# Patient Record
Sex: FEMALE | Race: BLACK OR AFRICAN AMERICAN | NOT HISPANIC OR LATINO | Employment: OTHER | URBAN - METROPOLITAN AREA
[De-identification: names, ages, dates, MRNs, and addresses within clinical notes are randomized per-mention and may not be internally consistent; named-entity substitution may affect disease eponyms.]

---

## 2017-02-19 ENCOUNTER — NURSE TRIAGE (OUTPATIENT)
Dept: ADMINISTRATIVE | Facility: CLINIC | Age: 57
End: 2017-02-19

## 2017-02-20 ENCOUNTER — HOSPITAL ENCOUNTER (EMERGENCY)
Facility: HOSPITAL | Age: 57
Discharge: HOME | End: 2017-02-20
Attending: FAMILY MEDICINE
Payer: MEDICARE

## 2017-02-20 VITALS
WEIGHT: 227 LBS | DIASTOLIC BLOOD PRESSURE: 85 MMHG | TEMPERATURE: 97 F | BODY MASS INDEX: 38.96 KG/M2 | HEART RATE: 70 BPM | SYSTOLIC BLOOD PRESSURE: 168 MMHG | RESPIRATION RATE: 16 BRPM

## 2017-02-20 DIAGNOSIS — L02.412 CUTANEOUS ABSCESS OF LEFT AXILLA: Primary | ICD-10-CM

## 2017-02-20 PROCEDURE — 25000003 PHARM REV CODE 250: Performed by: FAMILY MEDICINE

## 2017-02-20 PROCEDURE — 99283 EMERGENCY DEPT VISIT LOW MDM: CPT

## 2017-02-20 RX ORDER — SULFAMETHOXAZOLE AND TRIMETHOPRIM 800; 160 MG/1; MG/1
1 TABLET ORAL
Status: COMPLETED | OUTPATIENT
Start: 2017-02-20 | End: 2017-02-20

## 2017-02-20 RX ORDER — SULFAMETHOXAZOLE AND TRIMETHOPRIM 800; 160 MG/1; MG/1
1 TABLET ORAL 2 TIMES DAILY
Qty: 14 TABLET | Refills: 0 | Status: SHIPPED | OUTPATIENT
Start: 2017-02-20 | End: 2017-02-27

## 2017-02-20 RX ADMIN — SULFAMETHOXAZOLE AND TRIMETHOPRIM 1 TABLET: 800; 160 TABLET ORAL at 12:02

## 2017-02-20 NOTE — DISCHARGE INSTRUCTIONS
Abscess (Antibiotic Treatment Only)  An abscess (sometimes called a boil) happens when bacteria get trapped under the skin and start to grow. Pus forms inside the abscess as the body responds to the bacteria. An abscess can happen with an insect bite, ingrown hair, blocked oil gland, pimple, cyst, or puncture wound.  In the early stages, your wound may be red and tender. For this stage, you may get antibiotics. If the abscess does not get better with antibiotics, it will need to be drained with a small cut.  Home care  These tips will help you care for your abscess at home:  · Soak the wound in hot water or apply hot packs (small towel soaked in hot water) to the area for 20 minutes at a time. Do this 3 to 4 times a day.  · Do not cut, squeeze, or pop the boil yourself.  · Apply antibiotic cream or ointment to the skin 3 to 4 times a day, unless something else was prescribed. Some ointments include an antibiotic plus a pain reliever.  · If your doctor prescribed antibiotics, do not stop taking them until you have finished the medicine or the doctor tells you to stop.  · You may use an over-the-counter pain medicine to control pain, unless another pain medicine was prescribed. If you have chronic liver or kidney disease or ever had a stomach ulcer or gastrointestinal bleeding, talk with your doctor before using these any of these.  Follow-up care  Follow up with your healthcare provider, or as advised. Check your wound each day for the signs of worsening infection listed below.  When to seek medical advice  Get prompt medical attention if any of these occur:  · An increase in redness or swelling  · Red streaks in the skin leading away from the abscess  · An increase in local pain or swelling  · Fever of 100.4ºF (38ºC) or higher, or as directed by your healthcare provider  · Pus or fluid coming from the abscess  · Boil returns after getting better  Date Last Reviewed: 9/1/2016  © 2180-3380 [employer] [address] [place] [address]. All rights reserved. This information is not intended as a substitute for professional medical care. Always follow your healthcare professional's instructions.

## 2017-02-20 NOTE — ED PROVIDER NOTES
Encounter Date: 2/20/2017       History     Chief Complaint   Patient presents with    Abscess     Review of patient's allergies indicates:   Allergen Reactions    Iodine and iodide containing products Rash     Patient is a 56 y.o. female presenting with the following complaint: abscess. The history is provided by the patient. No  was used.   Abscess    This is a new problem. The current episode started several days ago. The problem has been gradually worsening. The abscess is present on the left arm. The pain is at a severity of 5/10. The abscess is characterized by redness and painfulness. Pertinent negatives include no anorexia, not drinking less, no fever, no diarrhea, no vomiting, no congestion, no rhinorrhea, no sore throat, no decreased responsiveness and no cough.     Patient developed a sore in her left axilla approximately 3 days ago.  Wound is become more swollen and painful.  No fevers chills nausea or vomiting.  Has had prior abscess difficulty but she does not think in that arm before.  She took no new medication  Past Medical History   Diagnosis Date    AI (aortic insufficiency)      mild    Anemia     Anticoagulant long-term use      coumadin    Asthma     CKD (chronic kidney disease) stage 4, GFR 15-29 ml/min 8/14/2014    Congestion of upper airway     Coronary artery disease      non obstructive    Depression 8/14/2014    Diabetes mellitus     Diabetes mellitus type 2 in obese 5/14/2015    Dialysis patient     Dyspnea     E. coli UTI 2014    Encounter for blood transfusion     Heart failure with preserved ejection fraction 8/14/2014    Hyperlipemia     Hypertension     Hypothyroidism 8/14/2014    Neuropathy 8/14/2014    PAF (paroxysmal atrial fibrillation)     Shortness of breath     Type 2 diabetes mellitus with diabetic nephropathy 8/14/2014     Past Medical History Pertinent Negatives   Diagnosis Date Noted    Transfusion reaction 1/11/2016     Past  Surgical History   Procedure Laterality Date    Tubal ligation      Hysterectomy  partial    Tonsillectomy      Abscess drainage Left      groin    Colonoscopy N/A 9/17/2015     Procedure: COLONOSCOPY;  Surgeon: Sorin Schneider MD;  Location: [facility];  Service: Endoscopy;  Laterality: N/A;    Cardiac catheterization  2/17/14, 7/24/09     non-obstructive CAD in RCA    Av fistula placement, brachiocephalic Left 1/11/2016    Colonoscopy N/A 3/8/2016     Procedure: COLONOSCOPY;  Surgeon: Luis Bogran-Reyes, MD;  Location: [facility];  Service: Endoscopy;  Laterality: N/A;    Upper gastrointestinal endoscopy       Family History   Problem Relation Age of Onset    Diabetes Mother     Asthma Father     Emphysema Father     Alcohol abuse Sister     Diabetes Brother     Asthma Sister     Cancer Sister     Throat cancer Sister     Diabetes Brother      Social History   Substance Use Topics    Smoking status: Current Some Day Smoker     Packs/day: 0.12     Years: 28.00     Types: Cigarettes     Start date: 1/19/1987    Smokeless tobacco: Never Used    Alcohol use No     Review of Systems   Constitutional: Negative for decreased responsiveness and fever.   HENT: Negative for congestion, rhinorrhea and sore throat.    Respiratory: Negative for cough.    Gastrointestinal: Negative for anorexia, diarrhea and vomiting.       Physical Exam   Initial Vitals   BP Pulse Resp Temp SpO2   02/20/17 0030 02/20/17 0030 02/20/17 0030 02/20/17 0030 --   175/91 72 17 97 °F (36.1 °C)      Physical Exam    Nursing note and vitals reviewed.  Constitutional: She appears well-developed and well-nourished.   Obese female in no acute distress.   HENT:   Head: Normocephalic and atraumatic.   Eyes: EOM are normal.   Neck: Normal range of motion.   Cardiovascular: Normal rate and regular rhythm.   Pulmonary/Chest: Breath sounds normal.   Skin:   Left axilla 3 cm abscess slight fluctuance.  Patient declined I&D.   Psychiatric: She  has a normal mood and affect. Her behavior is normal. Judgment and thought content normal.         ED Course   Procedures  Labs Reviewed - No data to display                            ED Course     Clinical Impression:   The encounter diagnosis was Cutaneous abscess of left axilla.          Ricardo Espinoza MD  02/20/17 0041

## 2017-02-20 NOTE — ED AVS SNAPSHOT
[facility]  [address] [address]               Caryn Mauricio   2017 12:31 AM   ED    Description:  Female : 1960   Department:  [facility]           Your Care was Coordinated By:     Provider Role From To    Ricardo Espinoza MD Attending Provider 17 0030 --      Reason for Visit     Abscess           Diagnoses this Visit        Comments    Cutaneous abscess of left axilla    -  Primary       ED Disposition     ED Disposition Condition Comment    Discharge             To Do List            These Medications        Disp Refills Start End    sulfamethoxazole-trimethoprim 800-160mg (BACTRIM DS) 800-160 mg Tab 14 tablet 0 2017    Take 1 tablet by mouth 2 (two) times daily. - Oral    Pharmacy: [facility] [address] [place] [address] #: [phone]         [facility]     [facility] -  Assistance  Registered nurses in the [facility] provide clinical advisement, health education, appointment booking, and other advisory services.  Call for this free service at [phone].             Medications           Message regarding Medications     Verify the changes and/or additions to your medication regime listed below are the same as discussed with your clinician today.  If any of these changes or additions are incorrect, please notify your healthcare provider.        START taking these NEW medications        Refills    sulfamethoxazole-trimethoprim 800-160mg (BACTRIM DS) 800-160 mg Tab 0    Sig: Take 1 tablet by mouth 2 (two) times daily.    Class: Print    Route: Oral      These medications were administered today        Dose Freq    sulfamethoxazole-trimethoprim 800-160mg per tablet 1 tablet 1 tablet ED 1 Time    Sig: Take 1 tablet by mouth ED 1 Time.    Class: Normal    Route: Oral           Verify that the below list of medications is an accurate  representation of the medications you are currently taking.  If none reported, the list may be blank. If incorrect, please contact your healthcare provider. Carry this list with you in case of emergency.           Current Medications     apixaban 2.5 mg Tab Take 1 tablet (2.5 mg total) by mouth 2 (two) times daily.    aspirin (ECOTRIN) 81 MG EC tablet Take 1 tablet (81 mg total) by mouth once daily.    blood sugar diagnostic Strp 1 strip by Misc.(Non-Drug; Combo Route) route 2 hours after meals and at bedtime.    carvedilol (COREG) 6.25 MG tablet Take 1 tablet (6.25 mg total) by mouth 2 (two) times daily with meals.    citalopram (CELEXA) 20 MG tablet Take 1 tablet (20 mg total) by mouth once daily.    docusate sodium (COLACE) 50 MG capsule Take 50 mg by mouth 2 (two) times daily as needed. OTC 1 tab every other day    ergocalciferol (ERGOCALCIFEROL) 50,000 unit Cap Take 1 capsule (50,000 Units total) by mouth every 7 days.    ferrous sulfate 325 (65 FE) MG EC tablet Take 1 tablet (325 mg total) by mouth 3 (three) times daily with meals.    furosemide (LASIX) 80 MG tablet Take 0.5 tablets (40 mg total) by mouth 2 (two) times daily.    gabapentin (NEURONTIN) 100 MG capsule Take 1 capsule (100 mg total) by mouth 3 (three) times daily.    hydrocodone-acetaminophen 10-325mg (NORCO)  mg Tab Take 1 tablet by mouth 2 (two) times daily as needed.    hydrocodone-acetaminophen 10-325mg (NORCO)  mg Tab Take 1 tablet by mouth every 12 (twelve) hours as needed for Pain.    insulin aspart (NOVOLOG FLEXPEN) 100 unit/mL InPn pen Inject 14 Units into the skin 3 (three) times daily with meals. Titrate as directed    insulin glargine (LANTUS SOLOSTAR) 100 unit/mL (3 mL) InPn pen Inject 40 Units into the skin every evening. Titrate as directed    isosorbide mononitrate (IMDUR) 30 MG 24 hr tablet Take 1 tablet (30 mg total) by mouth once daily.    levothyroxine (SYNTHROID) 150 MCG tablet Take 1 tablet (150 mcg total) by  "mouth before breakfast.    ondansetron (ZOFRAN) 4 MG tablet Take 1 tablet (4 mg total) by mouth every 6 (six) hours as needed for Nausea.    PEN NEEDLE 31 gauge x 1/4" Ndle USE TO INJECT INSULIN FOUR TIMES DAILY    pravastatin (PRAVACHOL) 20 MG tablet Take 1 tablet (20 mg total) by mouth every evening.    sulfamethoxazole-trimethoprim 800-160mg (BACTRIM DS) 800-160 mg Tab Take 1 tablet by mouth 2 (two) times daily.    sulfamethoxazole-trimethoprim 800-160mg per tablet 1 tablet Take 1 tablet by mouth ED 1 Time.    trazodone (DESYREL) 50 MG tablet Take 1 tablet (50 mg total) by mouth nightly as needed.           Clinical Reference Information           Your Vitals Were     BP Pulse Temp Resp Weight Last Period    175/91 (BP Location: Left arm, Patient Position: Sitting) 72 97 °F (36.1 °C) (Oral) 17 103 kg (227 lb) 12/23/2000 (LMP Unknown)    BMI                38.96 kg/m2          Allergies as of 2/20/2017        Reactions    Iodine And Iodide Containing Products Rash      Immunizations Administered on Date of Encounter - 2/20/2017     None      ED Micro, Lab, POCT     None      ED Imaging Orders     None        Discharge Instructions         Abscess (Antibiotic Treatment Only)  An abscess (sometimes called a boil) happens when bacteria get trapped under the skin and start to grow. Pus forms inside the abscess as the body responds to the bacteria. An abscess can happen with an insect bite, ingrown hair, blocked oil gland, pimple, cyst, or puncture wound.  In the early stages, your wound may be red and tender. For this stage, you may get antibiotics. If the abscess does not get better with antibiotics, it will need to be drained with a small cut.  Home care  These tips will help you care for your abscess at home:  · Soak the wound in hot water or apply hot packs (small towel soaked in hot water) to the area for 20 minutes at a time. Do this 3 to 4 times a day.  · Do not cut, squeeze, or pop the boil " yourself.  · Apply antibiotic cream or ointment to the skin 3 to 4 times a day, unless something else was prescribed. Some ointments include an antibiotic plus a pain reliever.  · If your doctor prescribed antibiotics, do not stop taking them until you have finished the medicine or the doctor tells you to stop.  · You may use an over-the-counter pain medicine to control pain, unless another pain medicine was prescribed. If you have chronic liver or kidney disease or ever had a stomach ulcer or gastrointestinal bleeding, talk with your doctor before using these any of these.  Follow-up care  Follow up with your healthcare provider, or as advised. Check your wound each day for the signs of worsening infection listed below.  When to seek medical advice  Get prompt medical attention if any of these occur:  · An increase in redness or swelling  · Red streaks in the skin leading away from the abscess  · An increase in local pain or swelling  · Fever of 100.4ºF (38ºC) or higher, or as directed by your healthcare provider  · Pus or fluid coming from the abscess  · Boil returns after getting better  Date Last Reviewed: 9/1/2016  © 0780-7212 [employer]. [address] [place] [address]. All rights reserved. This information is not intended as a substitute for professional medical care. Always follow your healthcare professional's instructions.          Your Scheduled Appointments     Feb 21, 2017 12:30 PM CST   Established Patient Visit with MD BALA Don - General Surgery ([facility])    [address] [facility] [address]   [phone]            Mar 13, 2017 10:30 AM CDT   Established Patient Visit with LYNSEY PMR RESIDENT   BALA Smiley - Phys. Med & Rehab ([facility])    [address] [facility] [address]   [phone]            May 31, 2017  9:00 AM CDT   Established Patient Visit with MD BALA Thomason IV -  Ophthalmology ([facility])    [address] [facility] [address]   [phone]            Jun 05, 2017  8:20 AM CDT   Fasting Lab with [facility]   [facility]    [address] [place] [address]   [phone]            Jun 12, 2017  9:40 AM CDT   Established Patient Visit with MD BALA [facility] - Endocrinology ([facility])    [address] [place] [address]   [phone]              Smoking Cessation     If you would like to quit smoking:   You may be eligible for free services if you are a [place] resident and started smoking cigarettes before September 1, 1988.  Call the Smoking Cessation Trust (SCT) toll free at (136) 997-7497 or (333) 215-5266.   Call 7-800-QUIT-NOW if you do not meet the above criteria.             [facility] complies with applicable Federal civil rights laws and does not discriminate on the basis of race, color, national origin, age, disability, or sex.        Language Assistance Services     ATTENTION: Language assistance services are available, free of charge. Please call [phone].      ATENCIÓN: Si habla español, tiene a newman disposición servicios gratuitos de asistencia lingüística. Llame al [phone].     CHÚ Ý: N?u b?n nói Ti?ng Vi?t, có các d?ch v? h? tr? ngôn ng? mi?n phí dành cho b?n. G?i s? [phone].

## 2017-02-20 NOTE — TELEPHONE ENCOUNTER
"  Reason for Disposition   SEVERE pain (e.g., excruciating)    Answer Assessment - Initial Assessment Questions  1. APPEARANCE of BOIL: "What does the boil look like?"       Looks full of infection  2. LOCATION: "Where is the boil located?"       Under left arm  3. NUMBER: "How many boils are there?"       one  4. SIZE: "How big is the boil?" (e.g., inches, cm; compare to size of a coin or other object)      Size of a quarter  5. ONSET: "When did the boil start?"      2-3 days ago  6. PAIN: "Is there any pain?" If so, ask: "How bad is the pain?"   (Scale 1-10; or mild, moderate, severe)      10  7. FEVER: "Do you have a fever?" If so, ask: "What is it, how was it measured, and when did it start?"       Did not check temp  8. SOURCE: "Have you been around anyone with boils or other Staph infections?" "Have you ever had boils before?"       no  9. OTHER SYMPTOMS: "Do you have any other symptoms?" (e.g., shaking chills, weakness, rash elsewhere on body)       no  10. PREGNANCY: "Is there any chance you are pregnant?" "When was your last menstrual period?"        no    Protocols used: ST BOIL (SKIN ABSCESS)-A-  pt has not taken anything for pain.  Pt states she is a dialysis pt and her fistula is in her upper arm and she is afraid to go to ED because they will probably chris it.  "

## 2017-03-03 ENCOUNTER — HOSPITAL ENCOUNTER (INPATIENT)
Facility: HOSPITAL | Age: 57
LOS: 2 days | Discharge: HOME | DRG: 602 | End: 2017-03-05
Attending: HOSPITALIST | Admitting: HOSPITALIST
Payer: MEDICARE

## 2017-03-03 ENCOUNTER — HOSPITAL ENCOUNTER (EMERGENCY)
Facility: HOSPITAL | Age: 57
Discharge: ANOTHER HOSPITAL | End: 2017-03-03
Attending: SURGERY
Payer: MEDICARE

## 2017-03-03 VITALS
SYSTOLIC BLOOD PRESSURE: 169 MMHG | OXYGEN SATURATION: 96 % | WEIGHT: 225 LBS | DIASTOLIC BLOOD PRESSURE: 58 MMHG | TEMPERATURE: 99 F | RESPIRATION RATE: 19 BRPM | BODY MASS INDEX: 38.62 KG/M2 | HEART RATE: 79 BPM

## 2017-03-03 DIAGNOSIS — I10 ESSENTIAL HYPERTENSION: Chronic | ICD-10-CM

## 2017-03-03 DIAGNOSIS — I50.20 SYSTOLIC CONGESTIVE HEART FAILURE: ICD-10-CM

## 2017-03-03 DIAGNOSIS — F32.A DEPRESSION: Chronic | ICD-10-CM

## 2017-03-03 DIAGNOSIS — Z99.2 DIALYSIS PATIENT: ICD-10-CM

## 2017-03-03 DIAGNOSIS — I95.9 HYPOTENSION, UNSPECIFIED HYPOTENSION TYPE: ICD-10-CM

## 2017-03-03 DIAGNOSIS — I25.83 CORONARY ARTERY DISEASE DUE TO LIPID RICH PLAQUE: Chronic | ICD-10-CM

## 2017-03-03 DIAGNOSIS — N18.9 ANEMIA OF RENAL DISEASE: Chronic | ICD-10-CM

## 2017-03-03 DIAGNOSIS — R11.2 INTRACTABLE VOMITING WITH NAUSEA, UNSPECIFIED VOMITING TYPE: Primary | ICD-10-CM

## 2017-03-03 DIAGNOSIS — E11.21 TYPE 2 DIABETES MELLITUS WITH DIABETIC NEPHROPATHY: Chronic | ICD-10-CM

## 2017-03-03 DIAGNOSIS — N18.6 ESRD NEEDING DIALYSIS: ICD-10-CM

## 2017-03-03 DIAGNOSIS — I50.30 HEART FAILURE WITH PRESERVED EJECTION FRACTION: Chronic | ICD-10-CM

## 2017-03-03 DIAGNOSIS — E78.5 HLD (HYPERLIPIDEMIA): Chronic | ICD-10-CM

## 2017-03-03 DIAGNOSIS — E03.9 HYPOTHYROIDISM: Chronic | ICD-10-CM

## 2017-03-03 DIAGNOSIS — I48.0 PAROXYSMAL ATRIAL FIBRILLATION: Chronic | ICD-10-CM

## 2017-03-03 DIAGNOSIS — R50.9 FEVER, UNSPECIFIED FEVER CAUSE: ICD-10-CM

## 2017-03-03 DIAGNOSIS — Z72.0 TOBACCO ABUSE: Chronic | ICD-10-CM

## 2017-03-03 DIAGNOSIS — G62.9 NEUROPATHY: Chronic | ICD-10-CM

## 2017-03-03 DIAGNOSIS — L02.419 ABSCESS OF AXILLARY REGION: ICD-10-CM

## 2017-03-03 DIAGNOSIS — I25.10 CORONARY ARTERY DISEASE DUE TO LIPID RICH PLAQUE: Chronic | ICD-10-CM

## 2017-03-03 DIAGNOSIS — D63.1 ANEMIA OF RENAL DISEASE: Chronic | ICD-10-CM

## 2017-03-03 DIAGNOSIS — R50.9 FEBRILE ILLNESS: ICD-10-CM

## 2017-03-03 DIAGNOSIS — Z79.01 CHRONIC ANTICOAGULATION: Primary | ICD-10-CM

## 2017-03-03 DIAGNOSIS — Z99.2 ESRD NEEDING DIALYSIS: ICD-10-CM

## 2017-03-03 DIAGNOSIS — E66.01 MORBID OBESITY WITH BMI OF 40.0-44.9, ADULT: Chronic | ICD-10-CM

## 2017-03-03 DIAGNOSIS — L02.419 AXILLARY ABSCESS: ICD-10-CM

## 2017-03-03 LAB
ALBUMIN SERPL BCP-MCNC: 3.6 G/DL
ALP SERPL-CCNC: 82 U/L
ALT SERPL W/O P-5'-P-CCNC: 9 U/L
ANION GAP SERPL CALC-SCNC: 12 MMOL/L
APTT BLDCRRT: 29 SEC
AST SERPL-CCNC: 12 U/L
BASOPHILS # BLD AUTO: 0.01 K/UL
BASOPHILS NFR BLD: 0.1 %
BILIRUB SERPL-MCNC: 0.6 MG/DL
BNP SERPL-MCNC: 585 PG/ML
BUN SERPL-MCNC: 34 MG/DL
CALCIUM SERPL-MCNC: 9.1 MG/DL
CHLORIDE SERPL-SCNC: 97 MMOL/L
CK MB SERPL-MCNC: 1.7 NG/ML
CK MB SERPL-RTO: 1.3 %
CK SERPL-CCNC: 128 U/L
CK SERPL-CCNC: 128 U/L
CO2 SERPL-SCNC: 29 MMOL/L
CREAT SERPL-MCNC: 5 MG/DL
DEPRECATED S PYO AG THROAT QL EIA: NEGATIVE
DIFFERENTIAL METHOD: ABNORMAL
EOSINOPHIL # BLD AUTO: 0.2 K/UL
EOSINOPHIL NFR BLD: 2.3 %
ERYTHROCYTE [DISTWIDTH] IN BLOOD BY AUTOMATED COUNT: 16.4 %
EST. GFR  (AFRICAN AMERICAN): 10 ML/MIN/1.73 M^2
EST. GFR  (NON AFRICAN AMERICAN): 9 ML/MIN/1.73 M^2
FLUAV AG SPEC QL IA: NEGATIVE
FLUBV AG SPEC QL IA: NEGATIVE
GLUCOSE SERPL-MCNC: 150 MG/DL
HCT VFR BLD AUTO: 36.9 %
HGB BLD-MCNC: 11.5 G/DL
HIV1+2 IGG SERPL QL IA.RAPID: NEGATIVE
INR PPP: 1.1
LACTATE SERPL-SCNC: 1 MMOL/L
LYMPHOCYTES # BLD AUTO: 0.8 K/UL
LYMPHOCYTES NFR BLD: 11.8 %
MCH RBC QN AUTO: 26.5 PG
MCHC RBC AUTO-ENTMCNC: 31.2 %
MCV RBC AUTO: 85 FL
MONOCYTES # BLD AUTO: 0.6 K/UL
MONOCYTES NFR BLD: 7.8 %
NEUTROPHILS # BLD AUTO: 5.5 K/UL
NEUTROPHILS NFR BLD: 78 %
PLATELET # BLD AUTO: 243 K/UL
PMV BLD AUTO: 11.1 FL
POTASSIUM SERPL-SCNC: 3.5 MMOL/L
PROT SERPL-MCNC: 7.6 G/DL
PROTHROMBIN TIME: 10.7 SEC
RBC # BLD AUTO: 4.34 M/UL
SODIUM SERPL-SCNC: 138 MMOL/L
SPECIMEN SOURCE: NORMAL
TROPONIN I SERPL DL<=0.01 NG/ML-MCNC: 0.02 NG/ML
TSH SERPL DL<=0.005 MIU/L-ACNC: 3.99 UIU/ML
WBC # BLD AUTO: 7.09 K/UL

## 2017-03-03 PROCEDURE — 99285 EMERGENCY DEPT VISIT HI MDM: CPT | Mod: 25

## 2017-03-03 PROCEDURE — 85025 COMPLETE CBC W/AUTO DIFF WBC: CPT

## 2017-03-03 PROCEDURE — 85610 PROTHROMBIN TIME: CPT

## 2017-03-03 PROCEDURE — 82553 CREATINE MB FRACTION: CPT

## 2017-03-03 PROCEDURE — 96367 TX/PROPH/DG ADDL SEQ IV INF: CPT

## 2017-03-03 PROCEDURE — 63600175 PHARM REV CODE 636 W HCPCS: Performed by: SURGERY

## 2017-03-03 PROCEDURE — 83880 ASSAY OF NATRIURETIC PEPTIDE: CPT

## 2017-03-03 PROCEDURE — 96365 THER/PROPH/DIAG IV INF INIT: CPT

## 2017-03-03 PROCEDURE — 84443 ASSAY THYROID STIM HORMONE: CPT

## 2017-03-03 PROCEDURE — 87040 BLOOD CULTURE FOR BACTERIA: CPT | Mod: 59

## 2017-03-03 PROCEDURE — 86701 HIV-1ANTIBODY: CPT

## 2017-03-03 PROCEDURE — 87081 CULTURE SCREEN ONLY: CPT

## 2017-03-03 PROCEDURE — 85730 THROMBOPLASTIN TIME PARTIAL: CPT

## 2017-03-03 PROCEDURE — 36415 COLL VENOUS BLD VENIPUNCTURE: CPT

## 2017-03-03 PROCEDURE — 87880 STREP A ASSAY W/OPTIC: CPT

## 2017-03-03 PROCEDURE — 87400 INFLUENZA A/B EACH AG IA: CPT

## 2017-03-03 PROCEDURE — 11000001 HC ACUTE MED/SURG PRIVATE ROOM

## 2017-03-03 PROCEDURE — 84484 ASSAY OF TROPONIN QUANT: CPT

## 2017-03-03 PROCEDURE — 96375 TX/PRO/DX INJ NEW DRUG ADDON: CPT

## 2017-03-03 PROCEDURE — 83605 ASSAY OF LACTIC ACID: CPT

## 2017-03-03 PROCEDURE — 25000003 PHARM REV CODE 250: Performed by: SURGERY

## 2017-03-03 PROCEDURE — 80053 COMPREHEN METABOLIC PANEL: CPT

## 2017-03-03 RX ORDER — SODIUM CHLORIDE 9 MG/ML
1000 INJECTION, SOLUTION INTRAVENOUS
Status: DISCONTINUED | OUTPATIENT
Start: 2017-03-03 | End: 2017-03-03

## 2017-03-03 RX ORDER — ONDANSETRON 2 MG/ML
4 INJECTION INTRAMUSCULAR; INTRAVENOUS
Status: COMPLETED | OUTPATIENT
Start: 2017-03-03 | End: 2017-03-03

## 2017-03-03 RX ADMIN — VANCOMYCIN HYDROCHLORIDE 1000 MG: 1 INJECTION, POWDER, LYOPHILIZED, FOR SOLUTION INTRAVENOUS at 08:03

## 2017-03-03 RX ADMIN — PROMETHAZINE HYDROCHLORIDE 12.5 MG: 25 INJECTION INTRAMUSCULAR; INTRAVENOUS at 07:03

## 2017-03-03 RX ADMIN — ONDANSETRON 4 MG: 2 INJECTION INTRAMUSCULAR; INTRAVENOUS at 08:03

## 2017-03-03 RX ADMIN — VANCOMYCIN HYDROCHLORIDE 1000 MG: 1 INJECTION, POWDER, LYOPHILIZED, FOR SOLUTION INTRAVENOUS at 09:03

## 2017-03-03 RX ADMIN — SODIUM CHLORIDE 500 ML: 0.9 INJECTION, SOLUTION INTRAVENOUS at 07:03

## 2017-03-03 NOTE — IP AVS SNAPSHOT
[place] [address] [address] [address]  Phone: [phone]           Patient Discharge Instructions     Our goal is to set you up for success. This packet includes information on your condition, medications, and your home care. It will help you to care for yourself so you don't get sicker and need to go back to the hospital.     Please ask your nurse if you have any questions.        There are many details to remember when preparing to leave the hospital. Here is what you will need to do:    1. Take your medicine. If you are prescribed medications, review your Medication List in the following pages. You may have new medications to  at the pharmacy and others that you'll need to stop taking. Review the instructions for how and when to take your medications. Talk with your doctor or nurses if you are unsure of what to do.     2. Go to your follow-up appointments. Specific follow-up information is listed in the following pages. Your may be contacted by a transition nurse or clinical provider about future appointments. Be sure we have all of the phone numbers to reach you, if needed. Please contact your provider's office if you are unable to make an appointment.     3. Watch for warning signs. Your doctor or nurse will give you detailed warning signs to watch for and when to call for assistance. These instructions may also include educational information about your condition. If you experience any of warning signs to your health, call your doctor.               ** Verify the list of medication(s) below is accurate and up to date. Carry this with you in case of emergency. If your medications have changed, please notify your healthcare provider.             Medication List      START taking these medications        Additional Info                      clindamycin 300 MG capsule   Commonly known as:  CLEOCIN   Quantity:  21 capsule   Refills:  0   Dose:  300 mg   Indications:   Skin & soft tissue    Last time this was given:  300 mg on 3/5/2017 11:48 AM   Instructions:  Take 1 capsule (300 mg total) by mouth every 8 (eight) hours.     Begin Date    AM    Noon    PM    Bedtime         CHANGE how you take these medications        Additional Info                      blood sugar diagnostic Strp   Quantity:  100 strip   Refills:  6   Dose:  1 strip   What changed:  additional instructions    Instructions:  1 strip by Misc.(Non-Drug; Combo Route) route 2 hours after meals and at bedtime.     Begin Date    AM    Noon    PM    Bedtime         CONTINUE taking these medications        Additional Info                      apixaban 2.5 mg Tab   Quantity:  180 tablet   Refills:  3   Dose:  2.5 mg    Last time this was given:  2.5 mg on 3/5/2017  9:22 AM   Instructions:  Take 1 tablet (2.5 mg total) by mouth 2 (two) times daily.     Begin Date    AM    Noon    PM    Bedtime       carvedilol 6.25 MG tablet   Commonly known as:  COREG   Quantity:  180 tablet   Refills:  5   Dose:  6.25 mg    Last time this was given:  6.25 mg on 3/5/2017  9:22 AM   Instructions:  Take 1 tablet (6.25 mg total) by mouth 2 (two) times daily with meals.     Begin Date    AM    Noon    PM    Bedtime       citalopram 20 MG tablet   Commonly known as:  CELEXA   Quantity:  90 tablet   Refills:  6   Dose:  20 mg    Last time this was given:  20 mg on 3/5/2017  9:22 AM   Instructions:  Take 1 tablet (20 mg total) by mouth once daily.     Begin Date    AM    Noon    PM    Bedtime       docusate sodium 50 MG capsule   Commonly known as:  COLACE   Refills:  0   Dose:  50 mg    Instructions:  Take 50 mg by mouth 2 (two) times daily as needed. OTC 1 tab every other day     Begin Date    AM    Noon    PM    Bedtime       ergocalciferol 50,000 unit Cap   Commonly known as:  ERGOCALCIFEROL   Quantity:  12 capsule   Refills:  2   Dose:  95231 Units    Instructions:  Take 1 capsule (50,000 Units total) by mouth every 7 days.     Begin Date     AM    Noon    PM    Bedtime       ferrous sulfate 325 (65 FE) MG EC tablet   Refills:  0   Dose:  325 mg    Instructions:  Take 1 tablet (325 mg total) by mouth 3 (three) times daily with meals.     Begin Date    AM    Noon    PM    Bedtime       furosemide 80 MG tablet   Commonly known as:  LASIX   Quantity:  180 tablet   Refills:  5   Dose:  40 mg   Comments:  May take extra dose for SOB/swelling    Last time this was given:  40 mg on 3/5/2017  9:22 AM   Instructions:  Take 0.5 tablets (40 mg total) by mouth 2 (two) times daily.     Begin Date    AM    Noon    PM    Bedtime       gabapentin 100 MG capsule   Commonly known as:  NEURONTIN   Quantity:  90 capsule   Refills:  11   Dose:  100 mg    Last time this was given:  100 mg on 3/5/2017  7:18 AM   Instructions:  Take 1 capsule (100 mg total) by mouth 3 (three) times daily.     Begin Date    AM    Noon    PM    Bedtime       hydrocodone-acetaminophen 10-325mg  mg Tab   Commonly known as:  NORCO   Quantity:  10 tablet   Refills:  0   Dose:  1 tablet    Instructions:  Take 1 tablet by mouth every 8 (eight) hours as needed for Pain.     Begin Date    AM    Noon    PM    Bedtime       insulin aspart 100 unit/mL Inpn pen   Commonly known as:  NOVOLOG FLEXPEN   Quantity:  38 mL   Refills:  3   Dose:  14 Units    Last time this was given:  2 Units on 3/5/2017  9:29 AM   Instructions:  Inject 14 Units into the skin 3 (three) times daily with meals. Titrate as directed     Begin Date    AM    Noon    PM    Bedtime       insulin glargine 100 unit/mL (3 mL) Inpn pen   Commonly known as:  LANTUS SOLOSTAR   Quantity:  31.5 mL   Refills:  3   Dose:  40 Units    Instructions:  Inject 40 Units into the skin every evening. Titrate as directed     Begin Date    AM    Noon    PM    Bedtime       isosorbide mononitrate 30 MG 24 hr tablet   Commonly known as:  IMDUR   Quantity:  90 tablet   Refills:  5   Dose:  30 mg    Last time this was given:  30 mg on 3/5/2017  9:22 AM  "  Instructions:  Take 1 tablet (30 mg total) by mouth once daily.     Begin Date    AM    Noon    PM    Bedtime       levothyroxine 150 MCG tablet   Commonly known as:  SYNTHROID   Quantity:  90 tablet   Refills:  3   Dose:  150 mcg    Last time this was given:  150 mcg on 3/5/2017  7:19 AM   Instructions:  Take 1 tablet (150 mcg total) by mouth before breakfast.     Begin Date    AM    Noon    PM    Bedtime       ondansetron 4 MG tablet   Commonly known as:  ZOFRAN   Quantity:  12 tablet   Refills:  0   Dose:  4 mg    Instructions:  Take 1 tablet (4 mg total) by mouth every 6 (six) hours as needed for Nausea.     Begin Date    AM    Noon    PM    Bedtime       PEN NEEDLE 31 gauge x 1/4" Ndle   Quantity:  100 each   Refills:  0   Generic drug:  pen needle, diabetic    Instructions:  USE TO INJECT INSULIN FOUR TIMES DAILY     Begin Date    AM    Noon    PM    Bedtime       pravastatin 20 MG tablet   Commonly known as:  PRAVACHOL   Quantity:  90 tablet   Refills:  5   Dose:  20 mg    Last time this was given:  20 mg on 3/4/2017 10:02 PM   Instructions:  Take 1 tablet (20 mg total) by mouth every evening.     Begin Date    AM    Noon    PM    Bedtime       trazodone 50 MG tablet   Commonly known as:  DESYREL   Quantity:  90 tablet   Refills:  3   Dose:  50 mg    Instructions:  Take 1 tablet (50 mg total) by mouth nightly as needed.     Begin Date    AM    Noon    PM    Bedtime         STOP taking these medications     aspirin 81 MG EC tablet   Commonly known as:  ECOTRIN            Where to Get Your Medications      You can get these medications from any pharmacy     Bring a paper prescription for each of these medications     clindamycin 300 MG capsule                  Please bring to all follow up appointments:    1. A copy of your discharge instructions.  2. All medicines you are currently taking in their original bottles.  3. Identification and insurance card.    Please arrive 15 minutes ahead of scheduled " appointment time.    Please call 24 hours in advance if you must reschedule your appointment and/or time.        Your Scheduled Appointments     Mar 20, 2017 12:45 PM CDT   Established Patient Visit with MD BALA Don - General Surgery ([facility]    [address] [place] [id number]   [phone]            May 31, 2017  9:00 AM CDT   Established Patient Visit with MD BALA Thomason IV - Ophthalmology ([facility]    [address] [place] [id number]   [phone]            Jun 05, 2017  8:20 AM CDT   Fasting Lab with [facility]    [address] [place] [id number]   [phone]            Jun 12, 2017  9:40 AM CDT   Established Patient Visit with MD BALA Taylor - Endocrinology ([facility])    [address] [place] [id number]   [phone]            Jul 10, 2017 10:30 AM CDT   Established Patient Visit with ROBBIE SMILEYR RESIDENT   BALA Smiley - Phys. Med & Rehab ([facility])    [address] [place] [id number]   [phone]              Follow-up Information     Follow up with Resume dialysis In 1 week.        Discharge Instructions     Future Orders    Diet general     Comments:    Renal, 1800 ADA and cardiac.    Questions:    Total calories:      Fat restriction, if any:      Protein restriction, if any:      Na restriction, if any:      Fluid restriction:      Additional restrictions:        Discharge References/Attachments     CLINDAMYCIN CAPSULES (ENGLISH)    ABSCESS, ANTIBIOTIC TREATMENT ONLY (ENGLISH)    ABSCESS, INCISION AND DRAINAGE (ENGLISH)        Primary Diagnosis     Your primary diagnosis was:  Armpit Abscess      Admission Information     Date & Time Provider Department [facility]    3/3/2017 11:25 PM Melissa Sargent MD [facility] [id number]      Care Providers      "Provider Role Specialty Primary office phone    Melissa Sargent MD Attending Provider Hospitalist [phone]    Emilee Mi MD Consulting Physician  Nephrology [phone]    Larry Zuniga MD Consulting Physician  General Surgery [phone]    Logan Osborne MD Consulting Physician  Neurology [phone]      Important Medicare Message          Most Recent Value    Important Message from Medicare Regarding Discharge Appeal Rights  Explained to patient/caregiver, Given to patient/caregiver, Signed/date by patient/caregiver yes 03/05/2017 1144      Your Vitals Were     BP Pulse Temp Resp Height Weight    113/54 (BP Location: Right arm, Patient Position: Lying, BP Method: Automatic) 61 98 °F (36.7 °C) (Oral) 18 5' 4" (1.626 m) 104 kg (229 lb 4.5 oz)    Last Period SpO2 BMI          12/23/2000 (LMP Unknown) 99% 39.36 kg/m2        Recent Lab Values        3/12/2015 6/15/2015 10/23/2015 11/16/2015 1/20/2016 2/12/2016 6/21/2016 3/4/2017      9:18 AM 10:20 AM 11:29 AM 11:01 AM  7:25 AM 12:57 PM  1:14 PM  5:09 AM    A1C 8.9 (H) 8.3 (H) 11.3 (H) 11.5 (H) 9.1 (H) 8.5 (H) 6.9 (H) 7.8 (H)      8.3 (H)          Comment for A1C at  5:09 AM on 3/4/2017:  According to ADA guidelines, hemoglobin A1C <7.0% represents  optimal control in non-pregnant diabetic patients.  Different  metrics may apply to specific populations.   Standards of Medical Care in Diabetes - 2016.  For the purpose of screening for the presence of diabetes:  <5.7%     Consistent with the absence of diabetes  5.7-6.4%  Consistent with increasing risk for diabetes   (prediabetes)  >or=6.5%  Consistent with diabetes  Currently no consensus exists for use of hemoglobin A1C  for diagnosis of diabetes for children.        Pending Labs     Order Current Status    Hepatitis B Surface Antibody, Qual/Quant In process    Hepatitis B surface antigen In process      Allergies as of 3/5/2017        Reactions    Iodine And Iodide Containing Products Rash    "   [facility]     [facility] Nurse Care Line - 24/7 Assistance  Unless otherwise directed by your provider, please contact [facility], our nurse care line that is available for 24/7 assistance.     Registered nurses in the [facility] provide clinical advisement, health education, appointment booking, and other advisory services.  Call for this free service at [phone].        Advance Directives     An advance directive is a document which, in the event you are no longer able to make decisions for yourself, tells your healthcare team what kind of treatment you do or do not want to receive, or who you would like to make those decisions for you.  If you do not currently have an advance directive, [facility] encourages you to create one.  For more information call:  [phone]), [phone] ([phone]),  or log on to [email or web address].        Language Assistance Services     ATTENTION: Language assistance services are available, free of charge. Please call [phone].      ATENCIÓN: Si habla español, tiene a newman disposición servicios gratuitos de asistencia lingüística. Llame al [phone].     CHÚ Ý: N?u b?n nói Ti?ng Vi?t, có các d?ch v? h? tr? ngôn ng? mi?n phí dành cho b?n. G?i s? [phone].        Heart Failure Education       Heart Failure: Being Active  You have a condition called heart failure. Being active doesnt mean that you have to wear yourself out. Even a little movement each day helps to strengthen your heart. If you cant get out to exercise, you can do simple stretching and strengthening exercises at home. These are good ways to keep you well-conditioned and prevent you and your heart from becoming excessively weak.    Ideas to get you started  · Add a little movement to things you do now. Walk to mail letters. Park your car at the far end of the parking lot and walk to the store. Walk up a flight of stairs instead of taking the  elevator.  · Choose activities you enjoy. You might walk, swim, or ride an exercise bike. Things like gardening and washing the car count, too. Other possibilities include: washing dishes, walking the dog, walking around the mall, and doing aerobic activities with friends.  · Join a group exercise program at a [facility] or [facility], a senior center, or a community center. Or look into a hospital cardiac rehabilitation program. Ask your doctor if you qualify.  Tips to keep you going  · Get up and get dressed each day. Go to a coffee shop and read a newspaper or go somewhere that you'll be in the presence of other active people. Youll feel more like being active.  · Make a plan. Choose one or more activities that you enjoy and that you can easily do. Then plan to do at least one each day. You might write your plan on a calendar.  · Go with a friend or a group if you like company. This can help you feel supported and stay motivated, too.  · Plan social events that you enjoy. This will keep you mentally engaged as well as physically motivated to do things you find pleasure in.  For your safety  · Talk with your healthcare provider before starting an exercise program.  · Exercise indoors when its too hot or too cold outside, or when the air quality is poor. Try walking at a shopping mall.  · Wear socks and sturdy shoes to maintain your balance and prevent falls.  · Start slowly. Do a few minutes several times a day at first. Increase your time and speed little by little.  · Stop and rest whenever you feel tired or get short of breath.  · Dont push yourself on days when you dont feel well.  Date Last Reviewed: 3/20/2016  © 7128-5221 [employer]. [address] [place] [address], [place] [address]. All rights reserved. This information is not intended as a substitute for professional medical care. Always follow your healthcare professional's instructions.              Heart Failure: Evaluating Your Heart  You have a  condition called heart failure. To evaluate your condition, your doctor will examine you, ask questions, and do some tests. Along with looking for signs of heart failure, the doctor looks for any other health problems that may have led to heart failure. The results of your evaluation will help your doctor form a treatment plan.  Health history and physical exam  Your visit will start with a health history. Tell the doctor about any symptoms youve noticed and about all medicines you take. Then youll have a physical exam. This includes listening to your heartbeat and breathing. Youll also be checked for swelling (edema) in your legs and neck. When you have fluid buildup or fluid in the lungs, it may be called congestive heart failure.  Diagnosing heart failure     During an echocardiogram, sound waves bounce off the heart. These are converted into a picture on the screen.   The following may be done to help your doctor form a diagnosis:  · X-rays show the size and shape of your heart. These pictures can also show fluid in your lungs.  · An electrocardiogram (ECG or EKG) shows the pattern of your heartbeat. Small pads (electrodes) are placed on your chest, arms, and legs. Wires connect the pads to the ECG machine, which records your hearts electrical signals. This can give the doctor information about heart function.  · An echocardiogram uses ultrasound waves to show the structure and movement of your heart muscle. This shows how well the heart pumps. It also shows the thickness of the heart walls, and if the heart is enlarged. It is one of the most useful, non-invasive tests as it provides information about the heart's general function. This helps your doctor make treatment decisions.  · Lab tests evaluate small amounts of blood or urine for signs of problems. A BNP lab test can help diagnose and evaluate heart failure. BNP stands for B-type natriuretic peptide. The ventricles secrete more BNP when heart failure  worsens. Lab tests can also provide information about metabolic dysfunction or heart dysfunction.  Your treatment plan  Based on the results of your evaluation and tests, your doctor will develop a treatment plan. This plan is designed to relieve some of your heart failure symptoms and help make you more comfortable. Your treatment plan may include:  · Medicine to help your heart work better and improve your quality of life  · Changes in what you eat and drink to help prevent fluid from backing up in your body  · Daily monitoring of your weight and heart failure symptoms to see how well your treatment plan is working  · Exercise to help you stay healthy  · Help with quitting smoking  · Emotional and psychological support to help adjust to the changes  · Referrals to other specialists to make sure you are being treated comprehensively  Date Last Reviewed: 3/21/2016  © 2119-0098 [employer]. [address] [place] [address], [place] [address]. All rights reserved. This information is not intended as a substitute for professional medical care. Always follow your healthcare professional's instructions.              Heart Failure: Making Changes to Your Diet  You have a condition called heart failure. When you have heart failure, excess fluid is more likely to build up in your body because your heart isn't working well. This makes the heart work harder to pump blood. Fluid buildup causes symptoms such as shortness of breath and swelling (edema). This is often referred to as congestive heart failure or CHF. Controlling the amount of salt (sodium) you eat may help stop fluid from building up. Your doctor may also tell you to reduce the amount of fluid you drink.  Reading food labels    Your healthcare provider will tell you how much sodium you can eat each day. Read food labels to keep track. Keep in mind that certain foods are high in salt. These include canned, frozen, and processed foods. Check the amount of sodium  in each serving. Watch out for high-sodium ingredients. These include MSG (monosodium glutamate), baking soda, and sodium phosphate.   Eating less salt  Give yourself time to get used to eating less salt. It may take a little while. Here are some tips to help:  · Take the saltshaker off the table. Replace it with salt-free herb mixes and spices.  · Eat fresh or plain frozen vegetables. These have much less salt than canned vegetables.  · Choose low-sodium snacks like sodium-free pretzels, crackers, or air-popped popcorn.  · Dont add salt to your food when youre cooking. Instead, season your foods with pepper, lemon, garlic, or onion.  · When you eat out, ask that your food be cooked without added salt.  · Avoid eating fried foods as these often have a great deal of salt.  If youre told to limit fluids  You may need to limit how much fluid you have to help prevent swelling. This includes anything that is liquid at room temperature, such as ice cream and soup. If your doctor tells you to limit fluid, try these tips:  · Measure drinks in a measuring cup before you drink them. This will help you meet daily goals.  · Chill drinks to make them more refreshing.  · Suck on frozen lemon wedges to quench thirst.  · Only drink when youre thirsty.  · Chew sugarless gum or suck on hard candy to keep your mouth moist.  · Weigh yourself daily to know if your body's fluid content is rising.  My sodium goal  Your healthcare provider may give you a sodium goal to meet each day. This includes sodium found in food as well as salt that you add. My goal is to eat no more than ___________ mg of sodium per day.     When to call your doctor  Call your doctor right away if you have any symptoms of worsening heart failure. These can include:  · Sudden weight gain  · Increased swelling of your legs or ankles  · Trouble breathing when youre resting or at night  · Increase in the number of pillows you have to sleep on  · Chest pain,  pressure, discomfort, or pain in the jaw, neck, or back   Date Last Reviewed: 3/21/2016  © 1849-8670 [employer]. [address] [place] [address], [place] [address]. All rights reserved. This information is not intended as a substitute for professional medical care. Always follow your healthcare professional's instructions.              Heart Failure: Medicines to Help Your Heart    You have a condition called heart failure (also known as congestive heart failure, or CHF). Your doctor will likely prescribe medicines for heart failure and any underlying health problems you have. Most heart failure patients take one or more types of medicinen. Your healthcare provider will work to find the combination of medicines that works best for you.  Heart failure medicines  Here are the most common heart failure medicines:  · ACE inhibitors lower blood pressure and decrease strain on the heart. This makes it easier for the heart to pump. Angiotensin receptor blockers have similar effects. These are prescribed for some patients instead of ACE inhibitors.  · Beta-blockers relieve stress on the heart. They also improve symptoms. They may also improve the heart's pumping action over time.  · Diuretics (also called water pills) help rid your body of excess water. This can help rid your body of swelling (edema). Having less fluid to pump means your heart doesnt have to work as hard. Some diuretics make your body lose a mineral called potassium. Your doctor will tell you if you need to take supplements or eat more foods high in potassium.  · Digoxin helps your heart pump with more strength. This helps your heart pump more blood with each beat. So, more oxygen-rich blood travels to the rest of the body.  · Aldosterone antagonists help alter hormones and decrease strain on the heart.  · Hydralazine and nitrates are two separate medicines used together to treat heart failure. They may come in one combination pill. They lower  blood pressure and decrease how hard the heart has to pump.  Medicines for related conditions  Controlling other heart problems helps keep heart failure under control, too. Depending on other heart problems you have, medicines may be prescribed to:  · Lower blood pressure (antihypertensives).  · Lower cholesterol levels (statins).  · Prevent blood clots (anticoagulants or aspirin).  · Keep the heartbeat steady (antiarrhythmics).  Date Last Reviewed: 3/5/2016  © 2980-6575 "[employer]. [address] [place] [address]. All rights reserved. This information is not intended as a substitute for professional medical care. Always follow your healthcare professional's instructions.              Heart Failure: Procedures That May Help    The heart is a muscle that pumps oxygen-rich blood to all parts of the body. When you have heart failure, the heart is not able to pump as well as it should. Blood and fluid may back up into the lungs (congestive heart failure), and some parts of the body dont get enough oxygen-rich blood to work normally. These problems lead to the symptoms of heart failure.     Certain procedures may help the heart pump better in some cases of heart failure. Some procedures are done to treat health problems that may have caused the heart failure such as coronary artery disease or heart rhythm problems. For more serious heart failure, other options are available.  Treating artery and valve problems  If you have coronary artery disease or valve disease, procedures may be done to improve blood flow. This helps the heart pump better, which can improve heart failure symptoms. First, your doctor may do a cardiac catheterization to help detect clogged blood vessels or valve damage. During this procedure, a  thin tube (catheter) in inserted into a blood vessel and guided to the heart. There a dye is injected and a special type of X-ray (angiogram) is taken of the blood vessels. Procedures  to open a blocked artery or fix damaged valves can also be done using catheterization.  · Angioplasty uses a balloon-tipped instrument at the end of the catheter. The balloon is inflated to widen the narrowed artery. In many cases, a stent is expanded to further support the narrowed artery. A stent is a metal mesh tube.  · Valve surgery repairs or replacement of faulty valves can also be done during catheterization so blood can flow properly through the chambers of the heart.  Bypass surgery is another option to help treat blocked arteries. It uses a healthy blood vessel from elsewhere in the body. The healthy blood vessel is attached above and below the blocked area so that blood can flow around the blocked artery.  Treating heart rhythm problems  A device may be placed in the chest to help a weak heart maintain a healthy, heartbeat so the heart can pump more effectively:  · Pacemaker. A pacemaker is an implanted device that regulates your heartbeat electronically. It monitors your heart's rhythm and generates a painless electric impulse that helps the heart beat in a regular rhythm. A pacemaker is programmed to meet your specific heart rhythm needs.  · Biventricular pacing/cardiac resynchronization therapy. A type of pacemaker that paces both pumping chambers of the heart at the same time to coordinate contractions and to improve the heart's function. Some people with heart failure are candidates for this therapy.  · Implantable cardioverter defibrillator. A device similar to a pacemaker that senses when the heart is beating too fast and delivers an electrical shock to convert the fast rhythm to a normal rhythm. This can be a life saving device.  In severe cases  In more serious cases of heart failure when other treatments no longer work, other options may include:  · Ventricular assist devices (VADs). These are mechanical devices used to take over the pumping function for one or both of the heart's ventricles, or  pumping chambers. A VAD may be necessary when heart failure progresses to the point that medicines and other treatments no longer help. In some cases, a VAD may be used as a bridge to transplant.  · Heart transplant. This is replacing the diseased heart with a healthy one from a donor. This is an option for a few people who are very sick. A heart transplant is very serious and not an option for all patients. Your doctor can tell you more.  Date Last Reviewed: 3/20/2016  © 0433-2373 [employer]. [address] [place] [id number]. All rights reserved. This information is not intended as a substitute for professional medical care. Always follow your healthcare professional's instructions.              Heart Failure: Tracking Your Weight  You have a condition called heart failure. When you have heart failure, a sudden weight gain or a steady rise in weight is a warning sign that your body is retaining too much water and salt. This could mean your heart failure is getting worse. If left untreated, it can cause problems for your lungs and result in shortness of breath. Weighing yourself each day is the best way to know if youre retaining water. If your weight goes up quickly, call your doctor. You will be given instructions on how to get rid of the excess water. You will likely need medicines and to avoid salt. This will help your heart work better.  Call your doctor if you gain more than 2 pounds in 1 day, more than 5 pounds in 1 week, or whatever weight gain you were told to report by your doctor. This is often a sign of worsening heart failure and needs to be evaluated and treated. Your doctor will tell you what to do next.   Tips for weighing yourself    · Weigh yourself at the same time each morning, wearing the same clothes. Weigh yourself after urinating and before eating.  · Use the same scale each day. Make sure the numbers are easy to read. Put the scale on a flat, hard surface -- not on a  rug or carpet.  · Do not stop weighing yourself. If you forget one day, weigh again the next morning.  How to use your weight chart  · Keep your weight chart near the scale. Write your weight on the chart as soon as you get off the scale.  · Fill in the month and the start date on the chart. Then write down your weight each day. Your chart will look like this:    · If you miss a day, leave the space blank. Weigh yourself the next day and write your weight in the next space.  · Take your weight chart with you when you go to see your doctor.  Date Last Reviewed: 3/20/2016  © 3896-3857 [employer]. [address] [place] [address], [place] [address]. All rights reserved. This information is not intended as a substitute for professional medical care. Always follow your healthcare professional's instructions.              Heart Failure: Warning Signs of a Flare-Up  You have a condition called heart failure. Once you have heart failure, flare-ups can happen. Below are signs that can mean your heart failure is getting worse. If you notice any of these warning signs, call your healthcare provider.  Swelling    · Your feet, ankles, or lower legs get puffier.  · You notice skin changes on your lower legs.  · Your shoes feel too tight.  · Your clothes are tighter in the waist.  · You have trouble getting rings on or off your fingers.  Shortness of breath  · You have to breathe harder even when youre doing your normal activities or when youre resting.  · You are short of breath walking up stairs or even short distances.  · You wake up at night short of breath or coughing.  · You need to use more pillows or sit up to sleep.  · You wake up tired or restless.  Other warning signs  · You feel weaker, dizzy, or more tired.  · You have chest pain or changes in your heartbeat.  · You have a cough that wont go away.  · You cant remember things or dont feel like eating.  Tracking your weight  Gaining weight is often the first  warning sign that heart failure is getting worse. Gaining even a few pounds can be a sign that your body is retaining excess water and salt. Weighing yourself each day in the morning after you urinate and before you eat, is the best way to know if you're retaining water. Get a scale that is easy to read and make sure you wear the same clothes and use the same scale every time you weigh. Your healthcare provider will show you how to track your weight. Call your doctor if you gain more than 2 pounds in 1 day, 5 pounds in 1 week, or whatever weight gain you were told to report by your doctor. This is often a sign of worsening heart failure and needs to be evaluated and treated before it compromises your breathing. Your doctor will tell you what to do next.    Date Last Reviewed: 3/15/2016  © 2652-9810 [employer]. [address] [place] [address], [place] [address]. All rights reserved. This information is not intended as a substitute for professional medical care. Always follow your healthcare professional's instructions.              Pneumonmia Discharge Instructions                Chronic Kindey Disease Education             Diabetes Discharge Instructions                                   Eliquis Informaiton            [facility] complies with applicable Federal civil rights laws and does not discriminate on the basis of race, color, national origin, age, disability, or sex.

## 2017-03-04 LAB
ALBUMIN SERPL BCP-MCNC: 3.2 G/DL
ALP SERPL-CCNC: 74 U/L
ALT SERPL W/O P-5'-P-CCNC: 10 U/L
ANION GAP SERPL CALC-SCNC: 9 MMOL/L
AST SERPL-CCNC: 14 U/L
BASOPHILS # BLD AUTO: 0.01 K/UL
BASOPHILS NFR BLD: 0.2 %
BILIRUB SERPL-MCNC: 0.6 MG/DL
BUN SERPL-MCNC: 37 MG/DL
CALCIUM SERPL-MCNC: 8.4 MG/DL
CHLORIDE SERPL-SCNC: 99 MMOL/L
CO2 SERPL-SCNC: 27 MMOL/L
CREAT SERPL-MCNC: 4.7 MG/DL
DIASTOLIC DYSFUNCTION: NO
DIFFERENTIAL METHOD: ABNORMAL
EOSINOPHIL # BLD AUTO: 0.1 K/UL
EOSINOPHIL NFR BLD: 1.6 %
ERYTHROCYTE [DISTWIDTH] IN BLOOD BY AUTOMATED COUNT: 16.1 %
EST. GFR  (AFRICAN AMERICAN): 11 ML/MIN/1.73 M^2
EST. GFR  (NON AFRICAN AMERICAN): 10 ML/MIN/1.73 M^2
ESTIMATED PA SYSTOLIC PRESSURE: 45.25
GLOBAL PERICARDIAL EFFUSION: ABNORMAL
GLUCOSE SERPL-MCNC: 157 MG/DL
HCT VFR BLD AUTO: 33.2 %
HGB BLD-MCNC: 10.4 G/DL
LYMPHOCYTES # BLD AUTO: 0.8 K/UL
LYMPHOCYTES NFR BLD: 13 %
MAGNESIUM SERPL-MCNC: 2.1 MG/DL
MCH RBC QN AUTO: 26.8 PG
MCHC RBC AUTO-ENTMCNC: 31.3 %
MCV RBC AUTO: 86 FL
MITRAL VALVE REGURGITATION: ABNORMAL
MONOCYTES # BLD AUTO: 0.7 K/UL
MONOCYTES NFR BLD: 11.7 %
NEUTROPHILS # BLD AUTO: 4.6 K/UL
NEUTROPHILS NFR BLD: 73.5 %
PHOSPHATE SERPL-MCNC: 2.6 MG/DL
PLATELET # BLD AUTO: 238 K/UL
PMV BLD AUTO: 11.2 FL
POCT GLUCOSE: 162 MG/DL (ref 70–110)
POCT GLUCOSE: 242 MG/DL (ref 70–110)
POCT GLUCOSE: 70 MG/DL (ref 70–110)
POTASSIUM SERPL-SCNC: 3.4 MMOL/L
PROT SERPL-MCNC: 6.8 G/DL
RBC # BLD AUTO: 3.88 M/UL
RETIRED EF AND QEF - SEE NOTES: 55 (ref 55–65)
SODIUM SERPL-SCNC: 135 MMOL/L
TRICUSPID VALVE REGURGITATION: ABNORMAL
WBC # BLD AUTO: 6.31 K/UL

## 2017-03-04 PROCEDURE — 11000001 HC ACUTE MED/SURG PRIVATE ROOM

## 2017-03-04 PROCEDURE — 36415 COLL VENOUS BLD VENIPUNCTURE: CPT

## 2017-03-04 PROCEDURE — 80100016 HC MAINTENANCE HEMODIALYSIS

## 2017-03-04 PROCEDURE — 86706 HEP B SURFACE ANTIBODY: CPT

## 2017-03-04 PROCEDURE — 85025 COMPLETE CBC W/AUTO DIFF WBC: CPT

## 2017-03-04 PROCEDURE — 63600175 PHARM REV CODE 636 W HCPCS: Performed by: HOSPITALIST

## 2017-03-04 PROCEDURE — 87340 HEPATITIS B SURFACE AG IA: CPT

## 2017-03-04 PROCEDURE — 83735 ASSAY OF MAGNESIUM: CPT

## 2017-03-04 PROCEDURE — 80053 COMPREHEN METABOLIC PANEL: CPT

## 2017-03-04 PROCEDURE — 83036 HEMOGLOBIN GLYCOSYLATED A1C: CPT

## 2017-03-04 PROCEDURE — 63600175 PHARM REV CODE 636 W HCPCS: Performed by: INTERNAL MEDICINE

## 2017-03-04 PROCEDURE — 25000003 PHARM REV CODE 250: Performed by: HOSPITALIST

## 2017-03-04 PROCEDURE — 93306 TTE W/DOPPLER COMPLETE: CPT | Mod: 26,,, | Performed by: INTERNAL MEDICINE

## 2017-03-04 PROCEDURE — 84100 ASSAY OF PHOSPHORUS: CPT

## 2017-03-04 PROCEDURE — 93306 TTE W/DOPPLER COMPLETE: CPT

## 2017-03-04 RX ORDER — CARVEDILOL 6.25 MG/1
6.25 TABLET ORAL 2 TIMES DAILY WITH MEALS
Status: DISCONTINUED | OUTPATIENT
Start: 2017-03-04 | End: 2017-03-05 | Stop reason: HOSPADM

## 2017-03-04 RX ORDER — HYDRALAZINE HYDROCHLORIDE 20 MG/ML
10 INJECTION INTRAMUSCULAR; INTRAVENOUS EVERY 6 HOURS PRN
Status: DISCONTINUED | OUTPATIENT
Start: 2017-03-04 | End: 2017-03-05 | Stop reason: HOSPADM

## 2017-03-04 RX ORDER — GABAPENTIN 100 MG/1
100 CAPSULE ORAL 3 TIMES DAILY
Status: DISCONTINUED | OUTPATIENT
Start: 2017-03-04 | End: 2017-03-05 | Stop reason: HOSPADM

## 2017-03-04 RX ORDER — INSULIN ASPART 100 [IU]/ML
1-10 INJECTION, SOLUTION INTRAVENOUS; SUBCUTANEOUS
Status: DISCONTINUED | OUTPATIENT
Start: 2017-03-04 | End: 2017-03-05 | Stop reason: HOSPADM

## 2017-03-04 RX ORDER — HYDROCODONE BITARTRATE AND ACETAMINOPHEN 5; 325 MG/1; MG/1
1 TABLET ORAL EVERY 4 HOURS PRN
Status: DISCONTINUED | OUTPATIENT
Start: 2017-03-04 | End: 2017-03-05 | Stop reason: HOSPADM

## 2017-03-04 RX ORDER — INSULIN ASPART 100 [IU]/ML
14 INJECTION, SOLUTION INTRAVENOUS; SUBCUTANEOUS
Status: DISCONTINUED | OUTPATIENT
Start: 2017-03-04 | End: 2017-03-05 | Stop reason: HOSPADM

## 2017-03-04 RX ORDER — ONDANSETRON 2 MG/ML
8 INJECTION INTRAMUSCULAR; INTRAVENOUS EVERY 8 HOURS PRN
Status: DISCONTINUED | OUTPATIENT
Start: 2017-03-04 | End: 2017-03-04

## 2017-03-04 RX ORDER — CITALOPRAM 20 MG/1
20 TABLET, FILM COATED ORAL DAILY
Status: DISCONTINUED | OUTPATIENT
Start: 2017-03-04 | End: 2017-03-05 | Stop reason: HOSPADM

## 2017-03-04 RX ORDER — MORPHINE SULFATE 10 MG/ML
5 INJECTION INTRAMUSCULAR; INTRAVENOUS; SUBCUTANEOUS EVERY 4 HOURS PRN
Status: DISCONTINUED | OUTPATIENT
Start: 2017-03-04 | End: 2017-03-05 | Stop reason: HOSPADM

## 2017-03-04 RX ORDER — SODIUM CHLORIDE 0.9 % (FLUSH) 0.9 %
3 SYRINGE (ML) INJECTION EVERY 8 HOURS
Status: DISCONTINUED | OUTPATIENT
Start: 2017-03-04 | End: 2017-03-05 | Stop reason: HOSPADM

## 2017-03-04 RX ORDER — PROMETHAZINE HYDROCHLORIDE 25 MG/1
25 SUPPOSITORY RECTAL EVERY 6 HOURS PRN
Status: DISCONTINUED | OUTPATIENT
Start: 2017-03-04 | End: 2017-03-05 | Stop reason: HOSPADM

## 2017-03-04 RX ORDER — POLYETHYLENE GLYCOL 3350 17 G/17G
17 POWDER, FOR SOLUTION ORAL DAILY PRN
Status: DISCONTINUED | OUTPATIENT
Start: 2017-03-04 | End: 2017-03-05 | Stop reason: HOSPADM

## 2017-03-04 RX ORDER — LEVOTHYROXINE SODIUM 150 UG/1
150 TABLET ORAL
Status: DISCONTINUED | OUTPATIENT
Start: 2017-03-04 | End: 2017-03-05 | Stop reason: HOSPADM

## 2017-03-04 RX ORDER — RAMELTEON 8 MG/1
8 TABLET ORAL NIGHTLY PRN
Status: DISCONTINUED | OUTPATIENT
Start: 2017-03-04 | End: 2017-03-05 | Stop reason: HOSPADM

## 2017-03-04 RX ORDER — GLUCAGON 1 MG
1 KIT INJECTION
Status: DISCONTINUED | OUTPATIENT
Start: 2017-03-04 | End: 2017-03-05 | Stop reason: HOSPADM

## 2017-03-04 RX ORDER — PRAVASTATIN SODIUM 10 MG/1
20 TABLET ORAL NIGHTLY
Status: DISCONTINUED | OUTPATIENT
Start: 2017-03-04 | End: 2017-03-05 | Stop reason: HOSPADM

## 2017-03-04 RX ORDER — IBUPROFEN 200 MG
24 TABLET ORAL
Status: DISCONTINUED | OUTPATIENT
Start: 2017-03-04 | End: 2017-03-05 | Stop reason: HOSPADM

## 2017-03-04 RX ORDER — ACETAMINOPHEN 325 MG/1
650 TABLET ORAL EVERY 8 HOURS PRN
Status: DISCONTINUED | OUTPATIENT
Start: 2017-03-04 | End: 2017-03-05 | Stop reason: HOSPADM

## 2017-03-04 RX ORDER — SODIUM CHLORIDE 9 MG/ML
INJECTION, SOLUTION INTRAVENOUS ONCE
Status: DISCONTINUED | OUTPATIENT
Start: 2017-03-04 | End: 2017-03-05 | Stop reason: HOSPADM

## 2017-03-04 RX ORDER — SODIUM CHLORIDE 9 MG/ML
INJECTION, SOLUTION INTRAVENOUS
Status: DISCONTINUED | OUTPATIENT
Start: 2017-03-04 | End: 2017-03-05 | Stop reason: HOSPADM

## 2017-03-04 RX ORDER — IBUPROFEN 200 MG
16 TABLET ORAL
Status: DISCONTINUED | OUTPATIENT
Start: 2017-03-04 | End: 2017-03-05 | Stop reason: HOSPADM

## 2017-03-04 RX ORDER — ISOSORBIDE MONONITRATE 30 MG/1
30 TABLET, EXTENDED RELEASE ORAL DAILY
Status: DISCONTINUED | OUTPATIENT
Start: 2017-03-04 | End: 2017-03-05 | Stop reason: HOSPADM

## 2017-03-04 RX ORDER — AMOXICILLIN 250 MG
1 CAPSULE ORAL DAILY
Status: DISCONTINUED | OUTPATIENT
Start: 2017-03-04 | End: 2017-03-05 | Stop reason: HOSPADM

## 2017-03-04 RX ORDER — DEXTROMETHORPHAN POLISTIREX 30 MG/5 ML
1 SUSPENSION, EXTENDED RELEASE 12 HR ORAL DAILY PRN
Status: DISCONTINUED | OUTPATIENT
Start: 2017-03-04 | End: 2017-03-05 | Stop reason: HOSPADM

## 2017-03-04 RX ORDER — FUROSEMIDE 40 MG/1
40 TABLET ORAL 2 TIMES DAILY
Status: DISCONTINUED | OUTPATIENT
Start: 2017-03-04 | End: 2017-03-05 | Stop reason: HOSPADM

## 2017-03-04 RX ORDER — BISACODYL 10 MG
10 SUPPOSITORY, RECTAL RECTAL DAILY PRN
Status: DISCONTINUED | OUTPATIENT
Start: 2017-03-04 | End: 2017-03-05 | Stop reason: HOSPADM

## 2017-03-04 RX ORDER — METOPROLOL TARTRATE 1 MG/ML
5 INJECTION, SOLUTION INTRAVENOUS EVERY 5 MIN PRN
Status: DISCONTINUED | OUTPATIENT
Start: 2017-03-04 | End: 2017-03-05 | Stop reason: HOSPADM

## 2017-03-04 RX ADMIN — DOCUSATE SODIUM AND SENNOSIDES 1 TABLET: 8.6; 5 TABLET, FILM COATED ORAL at 10:03

## 2017-03-04 RX ADMIN — VANCOMYCIN HYDROCHLORIDE 1500 MG: 1 INJECTION, POWDER, LYOPHILIZED, FOR SOLUTION INTRAVENOUS at 07:03

## 2017-03-04 RX ADMIN — INSULIN DETEMIR 28 UNITS: 100 INJECTION, SOLUTION SUBCUTANEOUS at 10:03

## 2017-03-04 RX ADMIN — Medication 3 ML: at 06:03

## 2017-03-04 RX ADMIN — Medication 3 ML: at 10:03

## 2017-03-04 RX ADMIN — GABAPENTIN 100 MG: 100 CAPSULE ORAL at 10:03

## 2017-03-04 RX ADMIN — FUROSEMIDE 40 MG: 40 TABLET ORAL at 07:03

## 2017-03-04 RX ADMIN — CITALOPRAM HYDROBROMIDE 20 MG: 20 TABLET ORAL at 10:03

## 2017-03-04 RX ADMIN — INSULIN DETEMIR 28 UNITS: 100 INJECTION, SOLUTION SUBCUTANEOUS at 02:03

## 2017-03-04 RX ADMIN — LEVOTHYROXINE SODIUM 150 MCG: 150 TABLET ORAL at 06:03

## 2017-03-04 RX ADMIN — ERYTHROPOIETIN 8000 UNITS: 4000 INJECTION, SOLUTION INTRAVENOUS; SUBCUTANEOUS at 10:03

## 2017-03-04 RX ADMIN — CARVEDILOL 6.25 MG: 6.25 TABLET, FILM COATED ORAL at 10:03

## 2017-03-04 RX ADMIN — CARVEDILOL 6.25 MG: 6.25 TABLET, FILM COATED ORAL at 07:03

## 2017-03-04 RX ADMIN — GABAPENTIN 100 MG: 100 CAPSULE ORAL at 06:03

## 2017-03-04 RX ADMIN — PRAVASTATIN SODIUM 20 MG: 10 TABLET ORAL at 02:03

## 2017-03-04 RX ADMIN — INSULIN ASPART 4 UNITS: 100 INJECTION, SOLUTION INTRAVENOUS; SUBCUTANEOUS at 10:03

## 2017-03-04 RX ADMIN — APIXABAN 2.5 MG: 2.5 TABLET, FILM COATED ORAL at 10:03

## 2017-03-04 RX ADMIN — PRAVASTATIN SODIUM 20 MG: 10 TABLET ORAL at 10:03

## 2017-03-04 NOTE — PLAN OF CARE
Problem: Patient Care Overview  Goal: Plan of Care Review  Outcome: Ongoing (interventions implemented as appropriate)  No falls this shift bed kept in low position call light and phone remain within reach bed alarm remain active. Patient able to reposition self in bed without assist.     Left axillary abscess noted drain prior, surgery consulted however, plan is to apply warm compress and monitor.   3/4/17 Vancomycin ordered every other day post HD      Nephrology consulted   CORDELIA fistula positive thrill and bruit   3/4/17 HD  2.5L removed  3/4/17 BUN 37/ creatine 4.7

## 2017-03-04 NOTE — ASSESSMENT & PLAN NOTE
Tobacco abuse  · Chronic tobacco use  · Tobacco cessation counseling  · Nicotine patch offered; consider Wellbutrin or Chantix through PCP as an outpatient (will require closer monitoring)  · I discussed with the patient regarding the hazardous effects of smoking on increasing risk of heart attack and stroke, worsening lung functions, and increasing cancer risk.   Patient was urged to stop smoking now.  I also offered nicotine taper (such as nicotine patch and gum) to help ease the craving to smoke.

## 2017-03-04 NOTE — SUBJECTIVE & OBJECTIVE
Interval History: Pt report fever and continued draining from previous drainage site.    Review of Systems   Constitutional: Positive for fever. Negative for chills.   Eyes: Negative for photophobia.   Respiratory: Negative for shortness of breath.    Cardiovascular: Negative for chest pain.     Objective:     Vital Signs (Most Recent):  Temp: 99.2 °F (37.3 °C) (03/04/17 0418)  Pulse: 68 (03/04/17 0418)  Resp: 18 (03/04/17 0418)  BP: 132/63 (03/04/17 0418)  SpO2: (!) 94 % (03/04/17 0418) Vital Signs (24h Range):  Temp:  [99.2 °F (37.3 °C)-100.7 °F (38.2 °C)] 99.2 °F (37.3 °C)  Pulse:  [68-79] 68  Resp:  [15-19] 18  SpO2:  [94 %-96 %] 94 %  BP: ()/() 132/63     Weight: 104.1 kg (229 lb 9.6 oz)  Body mass index is 39.41 kg/(m^2).  No intake or output data in the 24 hours ending 03/04/17 0935   Physical Exam   Constitutional: She is oriented to person, place, and time. She appears well-developed and well-nourished.   HENT:   Head: Normocephalic and atraumatic.   Eyes: EOM are normal. Pupils are equal, round, and reactive to light.   Neck: Normal range of motion. Neck supple.   Cardiovascular: Normal rate and regular rhythm.    Pulmonary/Chest: Effort normal and breath sounds normal.   Abdominal: Soft. Bowel sounds are normal.   Musculoskeletal: Normal range of motion.   Neurological: She is alert and oriented to person, place, and time.   Skin:   Left axilla with area of fluctuance and small opening with pus expressed with minimal palpation, + foul odor.   Psychiatric: She has a normal mood and affect. Her behavior is normal.       Significant Labs: All pertinent labs within the past 24 hours have been reviewed.    Significant Imaging: I have reviewed and interpreted all pertinent imaging results/findings within the past 24 hours.

## 2017-03-04 NOTE — ASSESSMENT & PLAN NOTE
· Currently rate controlled  · Maintain with beta-blocker with hold parameters  · Monitor on telemetry  · Maintain magnesium around 2.0  · Maintain potassium around 4.0

## 2017-03-04 NOTE — ASSESSMENT & PLAN NOTE
Pt is without chest pain, continue coreg, statin, and apixaban is on hold today for possible surgery to abscess.

## 2017-03-04 NOTE — NURSING
([facility] ambulance transported patient. Telemetry initiated.Conmunication board up to date, call bell in reach, bed in lowest position. Patient states she is in no pain at this time or has any needs. Patient looks very fatigued falling asleep.  Evaluated general patient appearance/condition. Patient resting quietly, easily aroused per verbal stimuli. Shift assessment initiated. No apparent distress noted at this time. Patient awake, alert, oriented. No apparent distress noted.  Patient has generalized discomfort.

## 2017-03-04 NOTE — ASSESSMENT & PLAN NOTE
Compensated CHF   · Evidenced by history,   · No evidence of pulmonary edema, peripheral edema  · BNP pending  · Provide diuresis w/ by mouth medication  · Maintain w/ beta-blocker, ACE inhibitor   · Daily Weights  · Strict I/O  · Low-sodium cardiac diet  · Chest X-ray  · Check TSH, albumin, UA, and renal function  · Obtain 2D echo if <6 months     EF   Date Value Ref Range Status   07/18/2016 55 55 - 65    01/08/2015 55 55 - 65      · EKG and cardiac enzymes PRN  · DVT prophylaxis w/ pharmacological and/or mechanical measures  · Oxygen supplementation support PRN    Instructions given to patient/family:  Monitor daily weight.  Regular activity within patient's limitations.  Low salt, low fat and low choleterol diet and restrict fluid < 2L per day.  Call MD if SOB, chest pain, weight gain > 2-3 lbs per day and/or 5-6 lbs per week.   No smoking. Annual influenza vaccine required.

## 2017-03-04 NOTE — ASSESSMENT & PLAN NOTE
End-stage renal disease on dialysis  · No acute issues with significant volume overload, electrolyte abnormality, or acid-base abnormality at this time  · Will need routine dialysis while inpatient  · Nephrology consulted

## 2017-03-04 NOTE — PLAN OF CARE
Problem: Hemodialysis (Adult)  Goal: Signs and Symptoms of Listed Potential Problems Will be Absent, Minimized or Managed (Hemodialysis)  Signs and symptoms of listed potential problems will be absent, minimized or managed by discharge/transition of care (reference Hemodialysis (Adult) CPG).  Outcome: Ongoing (interventions implemented as appropriate)    03/04/17 1100   Hemodialysis   Problems Assessed (Hemodialysis) all   Problems Present (Hemodialysis) cardiovascular complications;electrolyte imbalance;fluid imbalance;hematologic complications;infection;situational response   3.5 hour hd tx in progress as ordered.

## 2017-03-04 NOTE — ASSESSMENT & PLAN NOTE
Coronary artery disease  · No evidence of acute coronary syndrome at this time  · Maintain adequate blood pressure control  · Heart healthy diet  · Aspirin  · Statin

## 2017-03-04 NOTE — PROGRESS NOTES
24 hour acute dialysis contacted, spoke with Melina CELAYA  informed patient has order to have dialysis today.

## 2017-03-04 NOTE — ASSESSMENT & PLAN NOTE
Morbid obesity  · Body mass index is 38.62 kg/(m^2).  · Order a cardiac diet  · Counseling given  · Nutrition consult as an outpatient

## 2017-03-04 NOTE — PLAN OF CARE
03/04/17 1037   Discharge Assessment   Assessment Type Discharge Planning Assessment  (SW attempted Discharge Planning Assessment.  Patient was being wheeled of unit to dialysis.  SW to follow up at later tiime. )

## 2017-03-04 NOTE — CONSULTS
Caryn Mauricio is a 56 y.o. female patient.  Pt well known to me  Was being seen in [place] for CKD and now in [facility] for HD at [facility]  Pt last HD Thursday  Transferred here for fever- pt with left axilarry inflammation and admitted for further workup and abx  No HD related complaints  Scheduled Meds:   apixaban  2.5 mg Oral BID    carvedilol  6.25 mg Oral BID WM    citalopram  20 mg Oral Daily    furosemide  40 mg Oral BID    gabapentin  100 mg Oral TID    insulin aspart  14 Units Subcutaneous TID WM    insulin detemir  28 Units Subcutaneous QHS    isosorbide mononitrate  30 mg Oral Daily    levothyroxine  150 mcg Oral Before breakfast    pravastatin  20 mg Oral QHS    senna-docusate 8.6-50 mg  1 tablet Oral Daily    sodium chloride 0.9%  3 mL Intravenous Q8H    [START ON 3/5/2017] vancomycin (VANCOCIN) IVPB  15 mg/kg Intravenous Daily       Review of patient's allergies indicates:   Allergen Reactions    Iodine and iodide containing products Rash       Past Medical History:   Diagnosis Date    AI (aortic insufficiency)     mild    Anemia     Anticoagulant long-term use     coumadin    Asthma     CKD (chronic kidney disease) stage 4, GFR 15-29 ml/min 8/14/2014    Congestion of upper airway     Coronary artery disease     non obstructive    Depression 8/14/2014    Diabetes mellitus     Diabetes mellitus type 2 in obese 5/14/2015    Dialysis patient     Dyspnea     E. coli UTI 2014    Encounter for blood transfusion     Heart failure with preserved ejection fraction 8/14/2014    Hyperlipemia     Hypertension     Hypothyroidism 8/14/2014    Neuropathy 8/14/2014    PAF (paroxysmal atrial fibrillation)     Shortness of breath     Type 2 diabetes mellitus with diabetic nephropathy 8/14/2014     Past Surgical History:   Procedure Laterality Date    ABSCESS DRAINAGE Left     groin    AV FISTULA PLACEMENT, BRACHIOCEPHALIC Left 1/11/2016    CARDIAC CATHETERIZATION  2/17/14, 7/24/09     non-obstructive CAD in RCA    COLONOSCOPY N/A 9/17/2015    Procedure: COLONOSCOPY;  Surgeon: Sorin Schneider MD;  Location: [facility] ENDO;  Service: Endoscopy;  Laterality: N/A;    COLONOSCOPY N/A 3/8/2016    Procedure: COLONOSCOPY;  Surgeon: Luis Bogran-Reyes, MD;  Location: [facility] ENDO;  Service: Endoscopy;  Laterality: N/A;    HYSTERECTOMY  partial    TONSILLECTOMY      TUBAL LIGATION      UPPER GASTROINTESTINAL ENDOSCOPY        reports that she has been smoking Cigarettes.  She started smoking about 30 years ago. She has a 3.36 pack-year smoking history. She has never used smokeless tobacco. She reports that she does not drink alcohol or use illicit drugs.   Family History   Problem Relation Age of Onset    Diabetes Mother     Asthma Father     Emphysema Father     Alcohol abuse Sister     Diabetes Brother     Asthma Sister     Cancer Sister     Throat cancer Sister     Diabetes Brother           Vital Signs Range (Last 24H):  Temp:  [99.2 °F (37.3 °C)-100.7 °F (38.2 °C)]   Pulse:  [68-79]   Resp:  [15-19]   BP: ()/()   SpO2:  [94 %-96 %]     I & O (Last 24H):No intake or output data in the 24 hours ending 03/04/17 0734        Physical Exam:  General appearance: well developed, well nourished, appears older than stated age  Lungs:  clear to auscultation bilaterally, normal respiratory effort and normal percussion bilaterally  Heart: regular rate and rhythm, S1, S2 normal, no murmur, click, rub or gallop and S4 present  Abdomen: soft, non-tender non-distented; bowel sounds normal; no masses,  no organomegaly  Extremities: no cyanosis or edema, or clubbing  LUE access- thrill, bruit, NVI  Draining abscess in left axilla  Laboratory:  CBC:   Recent Labs  Lab 03/04/17  0509   WBC 6.31   RBC 3.88*   HGB 10.4*   HCT 33.2*      MCV 86   MCH 26.8*   MCHC 31.3*     BMP:   Recent Labs  Lab 03/04/17  0509   *   *   K 3.4*   CL 99   CO2 27   BUN 37*   CREATININE 4.7*   CALCIUM 8.4*    MG 2.1     Microbiology Results (last 7 days)     ** No results found for the last 168 hours. **            Diagnostic Results:    1. ESRD  2. Abscess of axilla  3. Anemia of CKD  4. DM    To have HD while here TTS  No acute renal complaints  4 K bath  EPO   Follow vanco levels  Follow BCx        Emilee Mi  3/4/2017

## 2017-03-04 NOTE — PROGRESS NOTES
Patient returned to floor no complaints of pain or SOB noted. Bed in low position call light and phone within reach will continue to monitor.

## 2017-03-04 NOTE — PLAN OF CARE
Problem: Patient Care Overview  Goal: Plan of Care Review  Outcome: Ongoing (interventions implemented as appropriate)    03/04/17 0436   Coping/Psychosocial   Plan Of Care Reviewed With patient       Goal: Discharge Needs Assessment  Outcome: Ongoing (interventions implemented as appropriate)    03/04/17 0436   OTHER   Communicated expected length of stay with patient/caregiver no   Is patient able to care for self after discharge? Yes   Patient currently receives home health services? No   Living Environment   Able to Return to Prior Arrangements no   Discharge Needs Assessment   How many people do you have in your home that can help with your care after discharge? 1   Readmission Within The Last 30 Days no previous admission in last 30 days   Social Work Plan   Patient's perception of discharge disposition admitted as an inpatient   Patient/Family In Agreement With Plan yes   Activity/Self Care ROS   Equipment Currently Used at Home none   Living Environment   Transportation Available family or friend will provide       Goal: Interdisciplinary Rounds/Family Conf  Outcome: Ongoing (interventions implemented as appropriate)    03/04/17 0436   Interdisciplinary Rounds/Family Conf   Participants nursing;patient         Problem: Fall Risk (Adult)  Intervention: Monitor/Assist with Self Care    03/04/17 0436   Activity   Activity Assistance Provided independent   Daily Care Interventions   Self-Care Promotion independence encouraged   Functional Level Current   Ambulation 0 - independent   Transferring 0 - independent   Toileting 0 - independent   Bathing 0 - independent   Dressing 0 - independent   Communication 0 - understands/communicates without difficulty   Swallowing 0 - swallows foods/liquids without difficulty       Intervention: Reduce Risk/Promote Restraint Free Environment    03/04/17 0436   Safety Interventions   Environmental Safety Modification clutter free environment maintained   Safety Interventions    Safety Precautions emergency equipment at bedside   Prevent [place] Drop/Fall   Safety/Security Measures bed alarm set       Intervention: Review Medications/Identify Contributors to Fall Risk    17   Safety Interventions   Medication Review/Management medications reviewed       Intervention: Patient Rounds    17   Safety Interventions   Patient Rounds bed in low position;bed wheels locked;call light in reach;clutter free environment maintained;ID band on;placement of personal items at bedside;toileting offered;visualized patient       Intervention: Safety Promotion/Fall Prevention    17   Safety Interventions   Safety Promotion/Fall Prevention Fall Risk reviewed with patient/family;Fall Risk signage in place;medications reviewed       Intervention: Safety Precautions    17   Safety Interventions   Safety Precautions emergency equipment at bedside         Goal: Identify Related Risk Factors and Signs and Symptoms  Related risk factors and signs and symptoms are identified upon initiation of Human Response Clinical Practice Guideline (CPG)   Outcome: Ongoing (interventions implemented as appropriate)    17   Fall Risk   Related Risk Factors (Fall Risk) age-related changes;inadequate lighting   Signs and Symptoms (Fall Risk) presence of risk factors       Goal: Absence of Falls  Patient will demonstrate the desired outcomes by discharge/transition of care.   Outcome: Ongoing (interventions implemented as appropriate)    17   Fall Risk (Adult)   Absence of Falls making progress toward outcome         Problem: Pressure Ulcer Risk (Chandu Scale) (Adult,Obstetrics,Pediatric)  Intervention: Promote/Optimize Nutrition    17   Nutrition Interventions   Oral Nutrition Promotion calorie dense foods provided;calorie dense liquids provided;safe use of adaptive equipment encouraged       Intervention: Prevent/Manage Excess Moisture    17    Hygiene Care   Bathing/Skin Care incontinence care   Skin Interventions   Skin Protection adhesive use limited;tubing/devices free from skin contact       Intervention: Maintain Head of Bed Elevation Less Than 30 Degrees as Tolerated    03/04/17 0436   Positioning   Head of Bed (HOB) HOB at 30 degrees       Intervention: Prevent/Minimize Sheer/Friction Injuries    03/04/17 0436   Skin Interventions   Pressure Reduction Devices pressure-redistributing mattress utilized   Pressure Reduction Techniques rest period provided between sit times   Positioning   Positioning/Transfer Devices pillows       Intervention: Turn/Reposition Often    03/04/17 0436   Skin Interventions   Pressure Reduction Techniques rest period provided between sit times   Positioning   Body Position positioned/repositioned independently         Goal: Identify Related Risk Factors and Signs and Symptoms  Related risk factors and signs and symptoms are identified upon initiation of Human Response Clinical Practice Guideline (CPG)   Outcome: Ongoing (interventions implemented as appropriate)    03/04/17 0436   Pressure Ulcer Risk (Chandu Scale)   Related Risk Factors (Pressure Ulcer Risk (Chandu Scale)) age extremes;hospitalization prolonged;medication;nutritional deficiencies;mobility impaired       Goal: Skin Integrity  Patient will demonstrate the desired outcomes by discharge/transition of care.   Outcome: Ongoing (interventions implemented as appropriate)    03/04/17 0436   Pressure Ulcer Risk (Chandu Scale) (Adult,Obstetrics,Pediatric)   Skin Integrity achieves outcome

## 2017-03-04 NOTE — ASSESSMENT & PLAN NOTE
· Fever for approximately 48 hours in this immunocompromised patient  · Concern for bacteremia from left axillary region abscess that was I&D but continues to drain after the initial I&D was performed on February 23  · Given a loading dose of vancomycin of 20 mg/kg  · Blood cultures obtained prior to administration of antibiotics at [facility]  · Continue to follow up culture results  · Wound care consult

## 2017-03-04 NOTE — H&P
[facility] Medicine  History & Physical    Patient Name: Caryn Mauricio  MRN: [id number]  Admission Date: 03/04/2017  Attending Physician: Osbaldo Saravia MD, MPH      PCP:     Travis Newell MD    CC:     Transfer from [place] emergency department for febrile illness    HISTORY OF PRESENT ILLNESS:     Caryn Mauricio is a 56 y.o. female that (in part)  has a past medical history of AI (aortic insufficiency); Anemia; Anticoagulant long-term use; Asthma; CKD (chronic kidney disease) stage 4, GFR 15-29 ml/min (8/14/2014); Congestion of upper airway; Coronary artery disease; Depression (8/14/2014); Diabetes mellitus; Diabetes mellitus type 2 in obese (5/14/2015); Dialysis patient; Dyspnea; E. coli UTI (2014); Encounter for blood transfusion; Heart failure with preserved ejection fraction (8/14/2014); Hyperlipemia; Hypertension; Hypothyroidism (8/14/2014); Neuropathy (8/14/2014); PAF (paroxysmal atrial fibrillation); Shortness of breath; and Type 2 diabetes mellitus with diabetic nephropathy (8/14/2014). Presents to [facility] from [place] [facility] complaining of 48 hours of fever.  Patient was noted to have continued fever while on dialysis yesterday.  She receives dialysis Tuesday, Thursday, and Saturday.  She had incision and drainage of an axillary abscess on the left on February 23.  Unfortunately this area continues to drain.  She denies being on antibiotics currently.  Associated symptoms include nausea with vomiting as well as a dull frontal headache.  Symptoms are of constant duration and progressively worsening.  Location is generalized.  Severity is moderate.  Frequency of one episode.    In the emergency department routine laboratory studies as well as blood cultures were obtained.  Other than the draining left axillary abscess, there is no obvious source of infection.  She was given a loading dose of vancomycin and transferred to our  facility for continued IV antibiotics as well as scheduled dialysis on Saturday, which was not available at [facility] on Saturdays.    Hospital medicine has been asked to admit for further evaluation and treatment.       REVIEW OF SYSTEMS:     -- Constitutional: Fever and generalized malaise.  -- Eyes: No visual changes, diplopia, pain, tearing, blind spots, or discharge.   -- Ears, nose, mouth, throat, and face: No congestion, sore throat, epistaxis, d/c, bleeding gums, neck stiffness masses, or dental issues.  -- Respiratory: No cough, shortness of breath, hemoptysis, stridor, wheezing, or night sweats.  -- Cardiovascular: No chest pain, MOSQUEDA, syncope, PND, edema, cyanosis, or palpitations.   -- Gastrointestinal: No vomiting, abdominal pain, dysphagia, hematemesis, melena, dyspepsia, or change in bowel habits.  -- Genitourinary: No hematuria, dysuria, frequency, urgency, nocturia, polyuria, stones, or incontinence.  -- Integument/breast: Left axillary draining abscess was recently I&D as noted above in history of present illness.  -- Hematologic/lymphatic: easy bruising.  Denies  lymphadenopathy.   -- Musculoskeletal: No acute arthralgias, acute myalgias, joint swelling, acute limitations of ROM.  Subacute generalizedmuscular weakness without focal deficit.  -- Neurological: No seizures, headaches, incoordination, paraesthesias, ataxia, vertigo, or tremors.  -- Behavioral/Psych: No auditory or visual hallucinations, depression, or suicidal/homicidal ideations.  -- Endocrine: No heat or cold intolerance, polydipsia, or unintentional weight gain / loss.  -- Allergy/Immunologic: No recurrent infections or adverse reaction to food, insects, or difficulty breathing.      PAST MEDICAL / SURGICAL HISTORY:     Past Medical History:   Diagnosis Date    AI (aortic insufficiency)     mild    Anemia     Anticoagulant long-term use     coumadin    Asthma     CKD (chronic kidney disease) stage 4, GFR 15-29 ml/min  8/14/2014    Congestion of upper airway     Coronary artery disease     non obstructive    Depression 8/14/2014    Diabetes mellitus     Diabetes mellitus type 2 in obese 5/14/2015    Dialysis patient     Dyspnea     E. coli UTI 2014    Encounter for blood transfusion     Heart failure with preserved ejection fraction 8/14/2014    Hyperlipemia     Hypertension     Hypothyroidism 8/14/2014    Neuropathy 8/14/2014    PAF (paroxysmal atrial fibrillation)     Shortness of breath     Type 2 diabetes mellitus with diabetic nephropathy 8/14/2014     Past Surgical History:   Procedure Laterality Date    ABSCESS DRAINAGE Left     groin    AV FISTULA PLACEMENT, BRACHIOCEPHALIC Left 1/11/2016    CARDIAC CATHETERIZATION  2/17/14, 7/24/09    non-obstructive CAD in RCA    COLONOSCOPY N/A 9/17/2015    Procedure: COLONOSCOPY;  Surgeon: Sorin Schneider MD;  Location: [facility];  Service: Endoscopy;  Laterality: N/A;    COLONOSCOPY N/A 3/8/2016    Procedure: COLONOSCOPY;  Surgeon: Luis Bogran-Reyes, MD;  Location: [facility];  Service: Endoscopy;  Laterality: N/A;    HYSTERECTOMY  partial    TONSILLECTOMY      TUBAL LIGATION      UPPER GASTROINTESTINAL ENDOSCOPY           FAMILY HISTORY:     Family History   Problem Relation Age of Onset    Diabetes Mother     Asthma Father     Emphysema Father     Alcohol abuse Sister     Diabetes Brother     Asthma Sister     Cancer Sister     Throat cancer Sister     Diabetes Brother          SOCIAL HISTORY:     Social History   Substance Use Topics    Smoking status: Current Some Day Smoker     Packs/day: 0.12     Years: 28.00     Types: Cigarettes     Start date: 1/19/1987    Smokeless tobacco: Never Used    Alcohol use No     Social History     Social History    Marital status: Single     Spouse name: N/A    Number of children: N/A    Years of education: 12th      Social History Main Topics    Smoking status: Current Some Day Smoker     Packs/day: 0.12      Years: 28.00     Types: Cigarettes     Start date: 1/19/1987    Smokeless tobacco: Never Used    Alcohol use No    Drug use: No    Sexual activity: Not Currently     Other Topics Concern    None     Social History Narrative         ALLERGIES:       Review of patient's allergies indicates:   Allergen Reactions    Iodine and iodide containing products Rash         HEALTH SCREENING:     -- Prevnar 13 pneumonia vaccine = evidence of previous vaccination found in the medical record  -- Influenza vaccine =  evidence of current flu vaccination found in the medical record for this flu season      HOME MEDICATIONS:     Prior to Admission medications    Medication Sig Start Date End Date Taking? Authorizing Provider   apixaban 2.5 mg Tab Take 1 tablet (2.5 mg total) by mouth 2 (two) times daily. 2/17/17 2/12/18  Wan Morris MD   aspirin (ECOTRIN) 81 MG EC tablet Take 1 tablet (81 mg total) by mouth once daily. 2/17/17 2/17/18  Wan Morris MD   blood sugar diagnostic Strp 1 strip by Misc.(Non-Drug; Combo Route) route 2 hours after meals and at bedtime.  Patient taking differently: 1 strip by Misc.(Non-Drug; Combo Route) route 2 hours after meals and at bedtime. relion prime 1/15/15   Emilee Mi MD   carvedilol (COREG) 6.25 MG tablet Take 1 tablet (6.25 mg total) by mouth 2 (two) times daily with meals. 2/17/17   Wan Morris MD   citalopram (CELEXA) 20 MG tablet Take 1 tablet (20 mg total) by mouth once daily. 8/18/16   Wan Morris MD   docusate sodium (COLACE) 50 MG capsule Take 50 mg by mouth 2 (two) times daily as needed. OTC 1 tab every other day    Historical Provider, MD   ergocalciferol (ERGOCALCIFEROL) 50,000 unit Cap Take 1 capsule (50,000 Units total) by mouth every 7 days. 2/17/17   Wan Morris MD   ferrous sulfate 325 (65 FE) MG EC tablet Take 1 tablet (325 mg total) by mouth 3 (three) times daily with meals. 10/12/16   Shamar Landis MD   furosemide (LASIX) 80 MG  "tablet Take 0.5 tablets (40 mg total) by mouth 2 (two) times daily. 7/19/16 7/19/17  Gutierrez Evans MD   gabapentin (NEURONTIN) 100 MG capsule Take 1 capsule (100 mg total) by mouth 3 (three) times daily. 6/2/16 6/2/17  Emilee Mi MD   hydrocodone-acetaminophen 10-325mg (NORCO)  mg Tab Take 1 tablet by mouth every 8 (eight) hours as needed for Pain. 2/23/17   Parrish Pereira MD   insulin aspart (NOVOLOG FLEXPEN) 100 unit/mL InPn pen Inject 14 Units into the skin 3 (three) times daily with meals. Titrate as directed 12/22/16 12/22/17  Zaria Dinero MD   insulin glargine (LANTUS SOLOSTAR) 100 unit/mL (3 mL) InPn pen Inject 40 Units into the skin every evening. Titrate as directed 12/5/16 12/5/17  Zaria Dinero MD   isosorbide mononitrate (IMDUR) 30 MG 24 hr tablet Take 1 tablet (30 mg total) by mouth once daily. 2/17/17 2/17/18  Wan Morris MD   levothyroxine (SYNTHROID) 150 MCG tablet Take 1 tablet (150 mcg total) by mouth before breakfast. 12/5/16 12/5/17  Zaria Dinero MD   ondansetron (ZOFRAN) 4 MG tablet Take 1 tablet (4 mg total) by mouth every 6 (six) hours as needed for Nausea. 7/26/14   Sunny Reed MD   PEN NEEDLE 31 gauge x 1/4" Ndle USE TO INJECT INSULIN FOUR TIMES DAILY 10/28/16   Suzie Germain MD   pravastatin (PRAVACHOL) 20 MG tablet Take 1 tablet (20 mg total) by mouth every evening. 2/17/17 2/17/18  Wan Morris MD   trazodone (DESYREL) 50 MG tablet Take 1 tablet (50 mg total) by mouth nightly as needed. 2/17/17   Wan Morris MD   insulin needles, disposable, (ULTICARE) 31 gauge X 1/4 " Ndle Inject 1 Units into the skin 4 (four) times daily. 10/23/15 10/28/16  Suzie Germain MD         [facility] MEDICATIONS:     Scheduled Meds:  Continuous Infusions:  PRN Meds:.      PHYSICAL EXAM:     Body mass index is 38.62 kg/(m^2).  Wt Readings from Last 1 Encounters:   03/03/17 2341 102.1 kg (225 lb)       Vitals:    03/03/17 2341 03/04/17 0100   BP: (!) " "189/86    BP Location: Right arm    Patient Position: Lying    BP Method: Automatic    Pulse: 72 74   Resp: 18    Temp: 99.3 °F (37.4 °C)    TempSrc: Oral    SpO2: 95%    Weight: 102.1 kg (225 lb)    Height: 5' 4" (1.626 m)           -- General appearance: Chronically ill-appearing middle-aged female who is well developed. appears older than stated age   -- Head: normocephalic, atraumatic   -- Eyes: conjunctivae clear. Extraocular muscles intact  -- Nose: Nares normal. Septum midline.   -- Throat: lips, mucosa, and tongue normal. no throat erythema.   -- Neck: supple, symmetrical, trachea midline, no JVD and thyroid not grossly enlarged, appears symmetric  -- Lungs: clear to auscultation bilaterally. normal respiratory effort. No use of accessory muscles.   -- Chest wall: no tenderness. equal bilateral chest rise   -- Heart: regular rate and rhythm. S1, S2 normal.  no click, rub or gallop   -- Abdomen: Obese, soft, non-tender, non-distended, non-tympanic; bowel sounds normal;   -- Extremities: AV fistula present.  no cyanosis, clubbing or edema.   -- Pulses: 2+ and symmetric   -- Skin: Draining abscess in left axilla.  color normal, texture normal, turgor normal.   -- Neurologic: Normal strength and tone. No focal numbness or weakness. CNII-XII intact. [place] coma scale: eyes open spontaneously-4, oriented & converses-5, obeys commands-6.      LABORATORY STUDIES:     Recent Results (from the past 36 hour(s))   Comprehensive metabolic panel    Collection Time: 03/03/17  6:10 PM   Result Value Ref Range    Sodium 138 136 - 145 mmol/L    Potassium 3.5 3.5 - 5.1 mmol/L    Chloride 97 95 - 110 mmol/L    CO2 29 23 - 29 mmol/L    Glucose 150 (H) 70 - 110 mg/dL    BUN, Bld 34 (H) 6 - 20 mg/dL    Creatinine 5.0 (H) 0.5 - 1.4 mg/dL    Calcium 9.1 8.7 - 10.5 mg/dL    Total Protein 7.6 6.0 - 8.4 g/dL    Albumin 3.6 3.5 - 5.2 g/dL    Total Bilirubin 0.6 0.1 - 1.0 mg/dL    Alkaline Phosphatase 82 55 - 135 U/L    AST 12 10 - 40 " U/L    ALT 9 (L) 10 - 44 U/L    Anion Gap 12 8 - 16 mmol/L    eGFR if African American 10 (A) >60 mL/min/1.73 m^2    eGFR if non African American 9 (A) >60 mL/min/1.73 m^2   CBC auto differential    Collection Time: 03/03/17  6:10 PM   Result Value Ref Range    WBC 7.09 3.90 - 12.70 K/uL    RBC 4.34 4.00 - 5.40 M/uL    Hemoglobin 11.5 (L) 12.0 - 16.0 g/dL    Hematocrit 36.9 (L) 37.0 - 48.5 %    MCV 85 82 - 98 fL    MCH 26.5 (L) 27.0 - 31.0 pg    MCHC 31.2 (L) 32.0 - 36.0 %    RDW 16.4 (H) 11.5 - 14.5 %    Platelets 243 150 - 350 K/uL    MPV 11.1 9.2 - 12.9 fL    Gran # 5.5 1.8 - 7.7 K/uL    Lymph # 0.8 (L) 1.0 - 4.8 K/uL    Mono # 0.6 0.3 - 1.0 K/uL    Eos # 0.2 0.0 - 0.5 K/uL    Baso # 0.01 0.00 - 0.20 K/uL    Gran% 78.0 (H) 38.0 - 73.0 %    Lymph% 11.8 (L) 18.0 - 48.0 %    Mono% 7.8 4.0 - 15.0 %    Eosinophil% 2.3 0.0 - 8.0 %    Basophil% 0.1 0.0 - 1.9 %    Differential Method Automated    CK    Collection Time: 03/03/17  6:10 PM   Result Value Ref Range     20 - 180 U/L   CK-MB    Collection Time: 03/03/17  6:10 PM   Result Value Ref Range     20 - 180 U/L    CPK MB 1.7 0.1 - 6.5 ng/mL    MB% 1.3 0.0 - 5.0 %   Protime-INR    Collection Time: 03/03/17  6:10 PM   Result Value Ref Range    Prothrombin Time 10.7 9.0 - 12.5 sec    INR 1.1 0.8 - 1.2   Brain natriuretic peptide    Collection Time: 03/03/17  6:10 PM   Result Value Ref Range     (H) 0 - 99 pg/mL   APTT    Collection Time: 03/03/17  6:10 PM   Result Value Ref Range    aPTT 29.0 21.0 - 32.0 sec   TSH    Collection Time: 03/03/17  6:10 PM   Result Value Ref Range    TSH 3.988 0.400 - 4.000 uIU/mL   Troponin I    Collection Time: 03/03/17  6:11 PM   Result Value Ref Range    Troponin I 0.021 0.000 - 0.026 ng/mL   Lactic acid, plasma    Collection Time: 03/03/17  6:18 PM   Result Value Ref Range    Lactate (Lactic Acid) 1.0 0.5 - 2.2 mmol/L   Rapid HIV    Collection Time: 03/03/17  6:18 PM   Result Value Ref Range    HIV Rapid Testing  Negative Negative   Influenza antigen Nasopharyngeal Swab    Collection Time: 03/03/17  6:50 PM   Result Value Ref Range    Influenza A Ag, EIA Negative Negative    Influenza B Ag, EIA Negative Negative    Flu A & B Source Nasopharyngeal Swab    Throat Screen, Rapid    Collection Time: 03/03/17  6:50 PM   Result Value Ref Range    Rapid Strep A Screen Negative Negative       Lab Results   Component Value Date    INR 1.1 03/03/2017    INR 1.0 08/29/2016    INR 1.2 07/19/2016     Lab Results   Component Value Date    HGBA1C 6.9 (H) 06/21/2016           MICROBIOLOGY DATA:     Urine Culture, Routine   Date Value Ref Range Status   06/30/2016   Final    Multiple organisms isolated. None in predominance.  Repeat if   06/30/2016 clinically necessary.  Final   01/27/2015 No growth  Final   01/15/2015 No growth  Final       Microbiology x 7d:   Microbiology Results (last 7 days)     ** No results found for the last 168 hours. **        Blood cultures obtained at [facility] - pending results        CONSULTS:     IP CONSULT TO NEPHROLOGY       ASSESSMENT & PLAN:     Primary Diagnosis:  Febrile illness    Active Hospital Problems    Diagnosis  POA    *Febrile illness [R50.9]  Yes     Priority: 1 - High    Abscess of axillary region [L02.419]  Yes     Priority: 2     ESRD needing dialysis [N18.6, Z99.2]  Yes     Priority: 3     Anemia of renal disease [D63.1]  Yes     Chronic    Tobacco abuse [Z72.0]  Yes     Chronic    Morbid obesity with BMI of 40.0-44.9, adult [E66.01, Z68.41]  Not Applicable     Chronic    Paroxysmal atrial fibrillation [I48.0]  Yes     Chronic    Coronary artery disease due to lipid rich plaque [I25.10, I25.83]  Yes     Chronic     non-obstructive in RCA      Type 2 diabetes mellitus with diabetic nephropathy [E11.21]  Yes     Chronic    Essential hypertension [I10]  Yes     Chronic    HLD (hyperlipidemia) [E78.5]  Yes     Chronic    Hypothyroidism [E03.9]  Yes     Chronic    Heart  failure with preserved ejection fraction [I50.30]  Yes     Chronic    Depression [F32.9]  Yes     Chronic    Neuropathy [G62.9]  Yes     Chronic      Resolved Hospital Problems    Diagnosis Date Resolved POA   No resolved problems to display.         Febrile illness  · Fever for approximately 48 hours in this immunocompromised patient  · Concern for bacteremia from left axillary region abscess that was I&D but continues to drain after the initial I&D was performed on February 23  · Given a loading dose of vancomycin of 20 mg/kg  · Blood cultures obtained prior to administration of antibiotics at [facility]  · Continue to follow up culture results  · Wound care consult      Abscess of axillary region  · Management as outlined above  · Culture wound      ESRD needing dialysis  End-stage renal disease on dialysis  · No acute issues with significant volume overload, electrolyte abnormality, or acid-base abnormality at this time  · Will need routine dialysis while inpatient  · Nephrology consulted            Type 2 diabetes mellitus with diabetic nephropathy  · BG in acceptable range at this time  · Maintain w/ subcutaneous insulin management order set  · Hold oral diabetic meds  · ADA 1800 kcal diet  · BG goal while in patient is <180mg/dL  · HgA1c = Pending      Essential hypertension  · Goal while inpatient is a systolic blood pressure less than 160mmHg  · BP in acceptable range at this time  · Continue current home regimen with hold parameters  · PRN antihypertensives available        HLD (hyperlipidemia)  · Lipid panel - as an outpatient  · Cardiac diet  · Continue statin        Hypothyroidism  · Clinically, patient is euthyroid   · Chemically, undetermined  · Obtain TSH, free T3, and free T4  · Continue current regimen      Heart failure with preserved ejection fraction  Compensated CHF   · Evidenced by history,   · No evidence of pulmonary edema, peripheral edema  · BNP pending  · Provide diuresis w/ by  mouth medication  · Maintain w/ beta-blocker, ACE inhibitor   · Daily Weights  · Strict I/O  · Low-sodium cardiac diet  · Chest X-ray  · Check TSH, albumin, UA, and renal function  · Obtain 2D echo if <6 months     EF   Date Value Ref Range Status   07/18/2016 55 55 - 65    01/08/2015 55 55 - 65      · EKG and cardiac enzymes PRN  · DVT prophylaxis w/ pharmacological and/or mechanical measures  · Oxygen supplementation support PRN    Instructions given to patient/family:  Monitor daily weight.  Regular activity within patient's limitations.  Low salt, low fat and low choleterol diet and restrict fluid < 2L per day.  Call MD if SOB, chest pain, weight gain > 2-3 lbs per day and/or 5-6 lbs per week.   No smoking. Annual influenza vaccine required.        Depression  depression disorder  · Supportive care  · Continue home medications  · Anxiolytics PRN          Neuropathy  Continue gabapentin      Coronary artery disease due to lipid rich plaque  Coronary artery disease  · No evidence of acute coronary syndrome at this time  · Maintain adequate blood pressure control  · Heart healthy diet  · Aspirin  · Statin          Paroxysmal atrial fibrillation  · Currently rate controlled  · Maintain with beta-blocker with hold parameters  · Monitor on telemetry  · Maintain magnesium around 2.0  · Maintain potassium around 4.0        Tobacco abuse  Tobacco abuse  · Chronic tobacco use  · Tobacco cessation counseling  · Nicotine patch offered; consider Wellbutrin or Chantix through PCP as an outpatient (will require closer monitoring)  · I discussed with the patient regarding the hazardous effects of smoking on increasing risk of heart attack and stroke, worsening lung functions, and increasing cancer risk.   Patient was urged to stop smoking now.  I also offered nicotine taper (such as nicotine patch and gum) to help ease the craving to smoke.        Morbid obesity with BMI of 40.0-44.9, adult  Morbid obesity  · Body mass index is  38.62 kg/(m^2).  · Order a cardiac diet  · Counseling given  · Nutrition consult as an outpatient          Anemia of renal disease  Monitor serial CBC          VTE Risk Mitigation     None          Adult PRN medications available   DVT prophylaxis given       DISPOSITION:     Will admit to the Hospital Medicine service for further evaluation and treatment.    Chart reviewed and updated where applicable.    High Risk Conditions:  Patient has a condition that poses threat to life and bodily function: Fever in immunocompromised patient; requiring IV antibiotics for draining abscess      ===============================================================    Osbaldo Saravia MD, MPH  Department of Hospital Medicine   [facility]  [phone] pg  (7pm - 6am)          This note is dictated using Dragon Medical 360 voice recognition software.  There are word recognition mistakes that are occasionally missed on review.

## 2017-03-04 NOTE — CONSULTS
HISTORY OF PRESENT ILLNESS:  We are asked to see the patient regarding left   axillary abscess.  The patient was transferred from [facility] in   [place] secondary to inability for dialysis and the patient has a left axillary   abscess, which was incised and drained by the Emergency Room on 02/23/2017.  I   was asked to evaluate her left axillary abscess.  Currently, she is on dialysis.    Denies any fevers or chills or complaints.  She reports that she is feeling   well.  She reports she has not been on antibiotics for her abscess.    PAST MEDICAL HISTORY:  Significant for aortic insufficiency, anemia, chronic   renal insufficiency requiring dialysis, coronary artery disease, type 2 diabetes   mellitus, history of asthma, heart failure, dyslipidemia, hypertension, and   atrial fibrillation.    PAST SURGICAL HISTORY:  Significant for left upper extremity fistula, cardiac   cath, colonoscopy, hysterectomy, tonsillectomy, tubal ligation, EGD, drainage of   left groin abscess, and tunneled dialysis catheter.    MEDICATIONS:  See the list provided.    PHYSICAL EXAMINATION:  VITAL SIGNS:  The time my seeing the patient's heart rate is 67, her blood   pressure is 143/65, respirations are 18, and T-max 98.2.  GENERAL:  She is on dialysis.  CHEST:  Clear to auscultation.  ABDOMEN:  Morbidly obese, but soft.  HEART:  Regular rate and rhythm.  EXTREMITIES:  No clubbing, cyanosis or edema.  Her left axilla and anterior   axial portion has got around a 2 x 1 cm with no erythema with to pinpoint small   holes consistent with folliculitis.  Around 2.5 mL of purulence was expressed.    Nothing else could be expressed.  There was no fluctuance.    LABORATORY DATA:  White blood cell count 6.1, hemoglobin 10.4, hematocrit of   33.2 and platelet count 232.  Sodium of 135, potassium 3.4, chloride 99, CO2 of   27, BUN of 37, and creatinine of 4.7.    ASSESSMENT:  Left axillary local folliculitis with minimal drainage.  No need    for incision and drainage.    PLAN:  Will be for observation and local care only with antibiotics and warm   compresses.  This should resolve without operative intervention.      SHARRON/  dd: 03/04/2017 11:12:16 (CST)  td: 03/04/2017 11:29:55 (CST)  Doc ID   #[id number]  Job ID #[id number]    CC:

## 2017-03-04 NOTE — ED NOTES
Received report from Archie Silverio Rn. Assumed care. Patient laying in bed, on cardiac monitor with stable vitals. Reports nausea. MD notified

## 2017-03-04 NOTE — ASSESSMENT & PLAN NOTE
Fever  Assessment and Plan:  Pt with persistent fever due to incomplete abscess drainage, will consult surgery and continue treatment with IV Vancomycin given with HD. Will f/u cultures.

## 2017-03-04 NOTE — ASSESSMENT & PLAN NOTE
· Clinically, patient is euthyroid   · Chemically, undetermined  · Obtain TSH, free T3, and free T4  · Continue current regimen

## 2017-03-04 NOTE — PROGRESS NOTES
[facility] Hospital Medicine  Progress Note    Patient Name: Caryn Mauricio  MRN: [id number]  Patient Class: IP- Inpatient   Admission Date: 3/3/2017  Length of Stay: 1 days  Attending Physician: Melissa Sargent MD  Primary Care Provider: Travis Newell MD        Subjective:     Principal Problem:Febrile illness    HPI:  RE:  MRN [id number] Mauricio is a 56 y.o. female that (in part)  has a past medical history of AI (aortic insufficiency); Anemia; Anticoagulant long-term use; Asthma; CKD (chronic kidney disease) stage 4, GFR 15-29 ml/min (8/14/2014); Congestion of upper airway; Coronary artery disease; Depression (8/14/2014); Diabetes mellitus; Diabetes mellitus type 2 in obese (5/14/2015); Dialysis patient; Dyspnea; E. coli UTI (2014); Encounter for blood transfusion; Heart failure with preserved ejection fraction (8/14/2014); Hyperlipemia; Hypertension; Hypothyroidism (8/14/2014); Neuropathy (8/14/2014); PAF (paroxysmal atrial fibrillation); Shortness of breath; and Type 2 diabetes mellitus with diabetic nephropathy (8/14/2014). Presents to [facility] from [facility] complaining of 48 hours of fever.  Patient was noted to have continued fever while on dialysis yesterday.  She receives dialysis Tuesday, Thursday, and Saturday.  She had incision and drainage of an axillary abscess on the left on February 23.  Unfortunately this area continues to drain.  She denies being on antibiotics currently.  Associated symptoms include nausea with vomiting as well as a dull frontal headache.  Symptoms are of constant duration and progressively worsening.  Location is generalized.  Severity is moderate.  Frequency of one episode.    In the emergency department routine laboratory studies as well as blood cultures were obtained.  Other than the draining left axillary abscess, there is no obvious source of infection.  She was given a loading dose of vancomycin and  transferred to our facility for continued IV antibiotics as well as scheduled dialysis on Saturday, which was not available at [facility] on Saturdays.      Hospital Course:  Ms. Mauricio was admitted for fever due to left axilla abscess that was incompletely drained. Pt on Vancomycin with dialysis.    Interval History: Pt report fever and continued draining from previous drainage site.    Review of Systems   Constitutional: Positive for fever. Negative for chills.   Eyes: Negative for photophobia.   Respiratory: Negative for shortness of breath.    Cardiovascular: Negative for chest pain.     Objective:     Vital Signs (Most Recent):  Temp: 99.2 °F (37.3 °C) (03/04/17 0418)  Pulse: 68 (03/04/17 0418)  Resp: 18 (03/04/17 0418)  BP: 132/63 (03/04/17 0418)  SpO2: (!) 94 % (03/04/17 0418) Vital Signs (24h Range):  Temp:  [99.2 °F (37.3 °C)-100.7 °F (38.2 °C)] 99.2 °F (37.3 °C)  Pulse:  [68-79] 68  Resp:  [15-19] 18  SpO2:  [94 %-96 %] 94 %  BP: ()/() 132/63     Weight: 104.1 kg (229 lb 9.6 oz)  Body mass index is 39.41 kg/(m^2).  No intake or output data in the 24 hours ending 03/04/17 0935   Physical Exam   Constitutional: She is oriented to person, place, and time. She appears well-developed and well-nourished.   HENT:   Head: Normocephalic and atraumatic.   Eyes: EOM are normal. Pupils are equal, round, and reactive to light.   Neck: Normal range of motion. Neck supple.   Cardiovascular: Normal rate and regular rhythm.    Pulmonary/Chest: Effort normal and breath sounds normal.   Abdominal: Soft. Bowel sounds are normal.   Musculoskeletal: Normal range of motion.   Neurological: She is alert and oriented to person, place, and time.   Skin:   Left axilla with area of fluctuance and small opening with pus expressed with minimal palpation, + foul odor.   Psychiatric: She has a normal mood and affect. Her behavior is normal.       Significant Labs: All pertinent labs within the past 24 hours have been  reviewed.    Significant Imaging: I have reviewed and interpreted all pertinent imaging results/findings within the past 24 hours.    Assessment/Plan:      Abscess of axillary region  Fever  Assessment and Plan:  Pt with persistent fever due to incomplete abscess drainage, will consult surgery and continue treatment with IV Vancomycin given with HD. Will f/u cultures.    Paroxysmal atrial fibrillation  Rate well controlled, hold apixaban for today until after surgery evaluation for abscess.    Coronary artery disease due to lipid rich plaque  Pt is without chest pain, continue coreg, statin, and apixaban is on hold today for possible surgery to abscess.    Heart failure with preserved ejection fraction  Compensated on home regimen which will be continued.    Essential hypertension  Continue home medication regimen.    HLD (hyperlipidemia)  Continue pravastatin.    ESRD needing dialysis  Nephrology consulted for continued HD.    Type 2 diabetes mellitus with diabetic nephropathy  Closely monitor blood glucose and make adjustments to insulin regimen as necessary. HbgA1C pending.    Hypothyroidism  Clinically euthyroid, continue levothyroxine.    Depression  Continue citalopram.    Neuropathy  Continue gabapentin.    Tobacco abuse  Smoking cessation counseling done, will readdress again prior to discharge.    Morbid obesity with BMI of 40.0-44.9, adult  BMI 39.41. Weigh reduction counseling done.    Anemia of renal disease  No reported bleeding and levels as expected with renal disease, will monitor.    VTE Risk Mitigation         Ordered     apixaban tablet 2.5 mg  2 times daily     Route:  Oral        03/04/17 0133     Medium Risk of VTE  Once      03/04/17 0133          Melissa Sargent MD  Department of Hospital Medicine   [facility]

## 2017-03-04 NOTE — CONSULTS
[id number]- Surgery full consult dictated    Assessment - left axillary follicular abscess s/p incision and drainage around 11 days ago. Area without erythema and will very mild induration with only one small drop of purulence expressed without surrounding fluctuance. No masses appreciated on exam.    Plan - this should resolve with local care only. Warm compress and observation

## 2017-03-04 NOTE — ED PROVIDER NOTES
[facility]                                        March 3, 2017                   Chief Complaint  56 y.o. female with Fatigue; Nausea; and Fever    History of Present Illness  Caryn Mauricio presents to the emergency room with fever for 48 hours  Pt is a Tuesday Thursday Saturday dialysis patient, had dialysis yesterday  Patient states she began having low-grade temperatures at the dialysis  Patient started to have nausea vomiting, presents with a temperature 100.7°F  Patient has a soft abdominal exam; also has a dull frontal headache on ROS  Patient has a draining left axillary abscess area, I&D performed on February 23  Patient states she has not felt well in the last 24 hours, feels dehydrated today    The history is provided by the patient    Past Medical History   -- AI (aortic insufficiency)      -- Anemia      -- Anticoagulant long-term use      -- Asthma      -- CKD (chronic kidney disease) stage 4, GFR 15-29 ml/min      -- Congestion of upper airway      -- Coronary artery disease      -- Depression      -- Diabetes mellitus      -- Diabetes mellitus type 2 in obese      -- Dyspnea      -- E. coli UTI      -- Encounter for blood transfusion      -- Heart failure with preserved ejection fraction      -- Hyperlipemia      -- Hypertension      -- Hypothyroidism      -- Neuropathy      -- PAF (paroxysmal atrial fibrillation)      -- Shortness of breath      -- Type 2 diabetes mellitus with diabetic nephropathy          Past Surgical History   -- Tubal ligation         -- Hysterectomy         -- Tonsillectomy         -- Abscess drainage         -- Colonoscopy         -- Cardiac catheterization         -- Av fistula placement, brachiocephalic         -- Colonoscopy         -- Upper gastrointestinal endoscopy             Allergies   -- Iodine And Iodide Containing Products      Review of Systems and Physical Exam     Review of Systems  -- Constitution - fever, fatigue, weakness, no  chills  -- Eyes - no tearing or redness, no visual disturbance  -- Ear, Nose - no tinnitus or earache, no nasal congestion or discharge  -- Mouth,Throat - no sore throat, no toothache, normal voice, normal swallowing  -- Respiratory - denies cough and congestion, no shortness of breath, no MOSQUEDA  -- Cardiovascular - denies chest pain, no palpitations, denies claudication  -- Gastrointestinal - nausea, vomiting, no abdominal pain or diarrhea  -- Genitourinary - no dysuria, no denies flank pain, no hematuria or frequency   -- Musculoskeletal - denies back pain, negative for myalgias and arthralgias   -- Neurological - headache, denies weakness or seizure; no LOC  -- Skin - denies pallor, rash, or changes in skin. no hives or welts noted    Vital Signs  -- Her blood pressure is 128/111 (abnormal) and her pulse is 75.   -- Her respiration is 15 and oxygen saturation is 96%.      Physical Exam  -- Nursing note and vitals reviewed  -- Head: Atraumatic. Normocephalic. No obvious abnormality  -- Eyes: Pupils are equal and reactive to light. Normal conjunctiva and lids  -- Cardiac: Normal rate, regular rhythm and normal heart sounds  -- Pulmonary: Normal respiratory effort, breath sounds clear to auscultation  -- Abdominal: Soft, no tenderness. Normal bowel sounds. Normal liver edge  -- Genitourinary: no flank pain on exam, no suprapubic pain by palpation   -- Musculoskeletal: Normal range of motion, no effusions. Joints stable   -- Neurological: No focal deficits. Showed good interaction with staff  -- Skin: Draining left axillary I&D site with no cellulitic spread    Emergency Room Course     Treatment and Evaluation  -- The EKG findings today were without concerning findings from baseline   -- The CT of the head performed in the ER today was negative for acute pathology   -- Chest x-ray showed no infiltrate and showed no acute pathology     Abnormal lab values  -- Glucose 150 (*)   -- BUN, Bld 34 (*)   -- Creatinine 5.0 (*)    -- ALT 9 (*)   -- EGF if  10 (*)   -- Hemoglobin 11.5 (*)   -- Hematocrit 36.9 (*)   --  (*)     Medications Given  -- vancomycin 1 g in dextrose 5 % 250 mL IVPB (ready to mix system)    -- vancomycin 1 g in dextrose 5 % 250 mL IVPB (ready to mix system)   -- sodium chloride 0.9% bolus 500 mL (0 mLs Intravenous Stopped 3/3/17 2017)   -- promethazine (PHENERGAN) 12.5 mg in dextrose 5 % 50 mL IVPB   -- ondansetron injection 4 mg (4 mg Intravenous Given 3/3/17 2036)     Diagnosis  -- Intractable vomiting with nausea  -- Fever  -- Hypotension  -- Dialysis patient  -- Axillary abscess     Disposition and Plan  -- Disposition: transfer  -- Condition: stable  -- The patient needs a higher level of care  -- The patient is appropriate for transfer  -- The patient was accepted for transfer at [facility]    This note is dictated on Dragon Natural Speaking word recognition program.  There are word recognition mistakes that are occasionally missed on review.           Sunny Reed MD  03/03/17 1166

## 2017-03-04 NOTE — PROGRESS NOTES
Head to toe assessment performed no complaints of pain or SOB noted bed in low position call light and phone within reach bed alarm active will continue to monitor.

## 2017-03-04 NOTE — ASSESSMENT & PLAN NOTE
Closely monitor blood glucose and make adjustments to insulin regimen as necessary. HbgA1C pending.

## 2017-03-04 NOTE — PROGRESS NOTES
Dr. Sargent informed patient complaining of numbness and tingling in left arm that is new today. Dr. Sargent order neurology consulted routine.

## 2017-03-05 VITALS
RESPIRATION RATE: 18 BRPM | HEART RATE: 60 BPM | WEIGHT: 229.25 LBS | HEIGHT: 64 IN | TEMPERATURE: 99 F | SYSTOLIC BLOOD PRESSURE: 104 MMHG | DIASTOLIC BLOOD PRESSURE: 57 MMHG | BODY MASS INDEX: 39.14 KG/M2 | OXYGEN SATURATION: 93 %

## 2017-03-05 LAB
ALBUMIN SERPL BCP-MCNC: 3.1 G/DL
ALP SERPL-CCNC: 71 U/L
ALT SERPL W/O P-5'-P-CCNC: 9 U/L
ANION GAP SERPL CALC-SCNC: 10 MMOL/L
AST SERPL-CCNC: 13 U/L
BASOPHILS # BLD AUTO: 0.02 K/UL
BASOPHILS NFR BLD: 0.4 %
BILIRUB SERPL-MCNC: 0.5 MG/DL
BUN SERPL-MCNC: 35 MG/DL
CALCIUM SERPL-MCNC: 8.5 MG/DL
CHLORIDE SERPL-SCNC: 102 MMOL/L
CO2 SERPL-SCNC: 24 MMOL/L
CREAT SERPL-MCNC: 6 MG/DL
DIFFERENTIAL METHOD: ABNORMAL
EOSINOPHIL # BLD AUTO: 0.3 K/UL
EOSINOPHIL NFR BLD: 5.3 %
ERYTHROCYTE [DISTWIDTH] IN BLOOD BY AUTOMATED COUNT: 16.3 %
EST. GFR  (AFRICAN AMERICAN): 8 ML/MIN/1.73 M^2
EST. GFR  (NON AFRICAN AMERICAN): 7 ML/MIN/1.73 M^2
ESTIMATED AVG GLUCOSE: 177 MG/DL
GLUCOSE SERPL-MCNC: 71 MG/DL
HBA1C MFR BLD HPLC: 7.8 %
HCT VFR BLD AUTO: 35.8 %
HGB BLD-MCNC: 11 G/DL
LYMPHOCYTES # BLD AUTO: 1.5 K/UL
LYMPHOCYTES NFR BLD: 28.7 %
MAGNESIUM SERPL-MCNC: 2.3 MG/DL
MCH RBC QN AUTO: 26.6 PG
MCHC RBC AUTO-ENTMCNC: 30.7 %
MCV RBC AUTO: 87 FL
MONOCYTES # BLD AUTO: 0.8 K/UL
MONOCYTES NFR BLD: 14.6 %
NEUTROPHILS # BLD AUTO: 2.6 K/UL
NEUTROPHILS NFR BLD: 51 %
PHOSPHATE SERPL-MCNC: 3.2 MG/DL
PLATELET # BLD AUTO: 234 K/UL
PMV BLD AUTO: 10.5 FL
POCT GLUCOSE: 144 MG/DL (ref 70–110)
POCT GLUCOSE: 175 MG/DL (ref 70–110)
POCT GLUCOSE: 192 MG/DL (ref 70–110)
POCT GLUCOSE: 264 MG/DL (ref 70–110)
POTASSIUM SERPL-SCNC: 3.7 MMOL/L
PROT SERPL-MCNC: 7 G/DL
RBC # BLD AUTO: 4.14 M/UL
SODIUM SERPL-SCNC: 136 MMOL/L
WBC # BLD AUTO: 5.13 K/UL

## 2017-03-05 PROCEDURE — 84100 ASSAY OF PHOSPHORUS: CPT

## 2017-03-05 PROCEDURE — 63600175 PHARM REV CODE 636 W HCPCS: Performed by: HOSPITALIST

## 2017-03-05 PROCEDURE — 83735 ASSAY OF MAGNESIUM: CPT

## 2017-03-05 PROCEDURE — 36415 COLL VENOUS BLD VENIPUNCTURE: CPT

## 2017-03-05 PROCEDURE — 25000003 PHARM REV CODE 250: Performed by: HOSPITALIST

## 2017-03-05 PROCEDURE — 85025 COMPLETE CBC W/AUTO DIFF WBC: CPT

## 2017-03-05 PROCEDURE — 99222 1ST HOSP IP/OBS MODERATE 55: CPT | Mod: ,,, | Performed by: PSYCHIATRY & NEUROLOGY

## 2017-03-05 PROCEDURE — 80053 COMPREHEN METABOLIC PANEL: CPT

## 2017-03-05 RX ORDER — CLINDAMYCIN HYDROCHLORIDE 150 MG/1
300 CAPSULE ORAL EVERY 8 HOURS
Status: DISCONTINUED | OUTPATIENT
Start: 2017-03-05 | End: 2017-03-05 | Stop reason: HOSPADM

## 2017-03-05 RX ORDER — CLINDAMYCIN HYDROCHLORIDE 300 MG/1
300 CAPSULE ORAL EVERY 8 HOURS
Qty: 21 CAPSULE | Refills: 0 | Status: SHIPPED | OUTPATIENT
Start: 2017-03-05 | End: 2017-03-12

## 2017-03-05 RX ADMIN — CITALOPRAM HYDROBROMIDE 20 MG: 20 TABLET ORAL at 09:03

## 2017-03-05 RX ADMIN — CARVEDILOL 6.25 MG: 6.25 TABLET, FILM COATED ORAL at 09:03

## 2017-03-05 RX ADMIN — CLINDAMYCIN HYDROCHLORIDE 300 MG: 150 CAPSULE ORAL at 11:03

## 2017-03-05 RX ADMIN — Medication 3 ML: at 07:03

## 2017-03-05 RX ADMIN — ISOSORBIDE MONONITRATE 30 MG: 30 TABLET, EXTENDED RELEASE ORAL at 09:03

## 2017-03-05 RX ADMIN — APIXABAN 2.5 MG: 2.5 TABLET, FILM COATED ORAL at 09:03

## 2017-03-05 RX ADMIN — FUROSEMIDE 40 MG: 40 TABLET ORAL at 09:03

## 2017-03-05 RX ADMIN — LEVOTHYROXINE SODIUM 150 MCG: 150 TABLET ORAL at 07:03

## 2017-03-05 RX ADMIN — GABAPENTIN 100 MG: 100 CAPSULE ORAL at 07:03

## 2017-03-05 RX ADMIN — INSULIN ASPART 2 UNITS: 100 INJECTION, SOLUTION INTRAVENOUS; SUBCUTANEOUS at 09:03

## 2017-03-05 RX ADMIN — GABAPENTIN 100 MG: 100 CAPSULE ORAL at 03:03

## 2017-03-05 RX ADMIN — DOCUSATE SODIUM AND SENNOSIDES 1 TABLET: 8.6; 5 TABLET, FILM COATED ORAL at 09:03

## 2017-03-05 NOTE — PROGRESS NOTES
Pt clinically improved  VSS/AF  Pe: no change    Labs reviewed    Surgery note reviewed    HD TTS while admitted  Will follow up Tuesday if still admitted

## 2017-03-05 NOTE — PLAN OF CARE
03/05/17 1144   Medicare Message   Important Message from Medicare regarding Discharge Appeal Rights Explained to patient/caregiver;Given to patient/caregiver;Signed/date by patient/caregiver   Date IMM was signed 03/05/17   Time IMM was signed 1136

## 2017-03-05 NOTE — PROGRESS NOTES
Head to toe assessment performed no complaints of pain or SOB noted bed in low position call light and phone within reach  will continue to monitor.

## 2017-03-05 NOTE — CONSULTS
"[facility]  Neurology  Consult Note    Patient Name: Caryn Mauricio  MRN: [id number]  Admission Date: 3/3/2017  Hospital Length of Stay: 2 days  Code Status: Full Code   Attending Provider: Alethea Sargent MD   Consulting Provider: Logan Chapman MD  Primary Care Physician: Travis Newell MD  Principal Problem:Febrile illness    Inpatient consult to Neurology  Consult performed by: LOGAN CHAPMAN  Consult ordered by: ALETHEA SARGENT        Subjective:     Chief Complaint: Numbness in left arm    HPI: 57 y/o female with medical Hx as listed below who was brought to ED for fever. Pt found to have an axillary abscess that was drained. Pt states that for several months she has been experiencing intermittent numbness and tingling in left forearm/hand. Ms. Mauricio reports that numbness and tingling is located in the posterior aspect of forearm and left hand. Symptoms are sometimes triggered by bending her elbow and can be relieved by "shaking" her arm. Denies weakness or involvement of other extremities. No visual or speech disturbances, vertigo, gait difficulties.    Past Medical History:   Diagnosis Date    AI (aortic insufficiency)     mild    Anemia     Anticoagulant long-term use     coumadin    Asthma     CKD (chronic kidney disease) stage 4, GFR 15-29 ml/min 8/14/2014    Congestion of upper airway     Coronary artery disease     non obstructive    Depression 8/14/2014    Diabetes mellitus     Diabetes mellitus type 2 in obese 5/14/2015    Dialysis patient     Dyspnea     E. coli UTI 2014    Encounter for blood transfusion     Heart failure with preserved ejection fraction 8/14/2014    Hyperlipemia     Hypertension     Hypothyroidism 8/14/2014    Neuropathy 8/14/2014    PAF (paroxysmal atrial fibrillation)     Shortness of breath     Type 2 diabetes mellitus with diabetic nephropathy 8/14/2014       Past Surgical History:   Procedure Laterality Date    ABSCESS DRAINAGE Left     " groin    AV FISTULA PLACEMENT, BRACHIOCEPHALIC Left 1/11/2016    CARDIAC CATHETERIZATION  2/17/14, 7/24/09    non-obstructive CAD in RCA    COLONOSCOPY N/A 9/17/2015    Procedure: COLONOSCOPY;  Surgeon: Sorin Schneider MD;  Location: [facility];  Service: Endoscopy;  Laterality: N/A;    COLONOSCOPY N/A 3/8/2016    Procedure: COLONOSCOPY;  Surgeon: Luis Bogran-Reyes, MD;  Location: [facility];  Service: Endoscopy;  Laterality: N/A;    HYSTERECTOMY  partial    TONSILLECTOMY      TUBAL LIGATION      UPPER GASTROINTESTINAL ENDOSCOPY         Review of patient's allergies indicates:   Allergen Reactions    Iodine and iodide containing products Rash       Current Neurological Medications:     No current facility-administered medications on file prior to encounter.      Current Outpatient Prescriptions on File Prior to Encounter   Medication Sig    apixaban 2.5 mg Tab Take 1 tablet (2.5 mg total) by mouth 2 (two) times daily.    blood sugar diagnostic Strp 1 strip by Misc.(Non-Drug; Combo Route) route 2 hours after meals and at bedtime. (Patient taking differently: 1 strip by Misc.(Non-Drug; Combo Route) route 2 hours after meals and at bedtime. relion prime)    carvedilol (COREG) 6.25 MG tablet Take 1 tablet (6.25 mg total) by mouth 2 (two) times daily with meals.    citalopram (CELEXA) 20 MG tablet Take 1 tablet (20 mg total) by mouth once daily.    docusate sodium (COLACE) 50 MG capsule Take 50 mg by mouth 2 (two) times daily as needed. OTC 1 tab every other day    ergocalciferol (ERGOCALCIFEROL) 50,000 unit Cap Take 1 capsule (50,000 Units total) by mouth every 7 days.    ferrous sulfate 325 (65 FE) MG EC tablet Take 1 tablet (325 mg total) by mouth 3 (three) times daily with meals.    furosemide (LASIX) 80 MG tablet Take 0.5 tablets (40 mg total) by mouth 2 (two) times daily.    gabapentin (NEURONTIN) 100 MG capsule Take 1 capsule (100 mg total) by mouth 3 (three) times daily.     "hydrocodone-acetaminophen 10-325mg (NORCO)  mg Tab Take 1 tablet by mouth every 8 (eight) hours as needed for Pain.    insulin aspart (NOVOLOG FLEXPEN) 100 unit/mL InPn pen Inject 14 Units into the skin 3 (three) times daily with meals. Titrate as directed    insulin glargine (LANTUS SOLOSTAR) 100 unit/mL (3 mL) InPn pen Inject 40 Units into the skin every evening. Titrate as directed    isosorbide mononitrate (IMDUR) 30 MG 24 hr tablet Take 1 tablet (30 mg total) by mouth once daily.    levothyroxine (SYNTHROID) 150 MCG tablet Take 1 tablet (150 mcg total) by mouth before breakfast.    ondansetron (ZOFRAN) 4 MG tablet Take 1 tablet (4 mg total) by mouth every 6 (six) hours as needed for Nausea.    PEN NEEDLE 31 gauge x 1/4" Ndle USE TO INJECT INSULIN FOUR TIMES DAILY    pravastatin (PRAVACHOL) 20 MG tablet Take 1 tablet (20 mg total) by mouth every evening.    trazodone (DESYREL) 50 MG tablet Take 1 tablet (50 mg total) by mouth nightly as needed.    [DISCONTINUED] insulin needles, disposable, (ULTICARE) 31 gauge X 1/4 " Ndle Inject 1 Units into the skin 4 (four) times daily.      Family History     Problem Relation (Age of Onset)    Alcohol abuse Sister    Asthma Father, Sister    Cancer Sister    Diabetes Mother, Brother, Brother    Emphysema Father    Throat cancer Sister        Social History Main Topics    Smoking status: Current Some Day Smoker     Packs/day: 0.12     Years: 28.00     Types: Cigarettes     Start date: 1/19/1987    Smokeless tobacco: Never Used    Alcohol use No    Drug use: No    Sexual activity: Not Currently     Review of Systems   Constitutional: Negative for chills and fever.   HENT: Negative for trouble swallowing.    Eyes: Negative for photophobia.   Respiratory: Negative for chest tightness and shortness of breath.    Cardiovascular: Negative for chest pain and palpitations.   Gastrointestinal: Negative for abdominal pain.   Musculoskeletal: Negative for arthralgias " and back pain.   Neurological: Positive for numbness. Negative for dizziness, tremors, seizures, syncope, facial asymmetry, speech difficulty, weakness, light-headedness and headaches.          Objective:     Vital Signs (Most Recent):  Temp: 98.7 °F (37.1 °C) (03/05/17 0840)  Pulse: 61 (03/05/17 0840)  Resp: 18 (03/05/17 0840)  BP: (!) 132/57 (03/05/17 0840)  SpO2: (!) 89 % (03/05/17 0840) Vital Signs (24h Range):  Temp:  [97.5 °F (36.4 °C)-99.8 °F (37.7 °C)] 98.7 °F (37.1 °C)  Pulse:  [58-75] 61  Resp:  [18-20] 18  SpO2:  [89 %-99 %] 89 %  BP: (107-132)/(44-63) 132/57     Weight: 104 kg (229 lb 4.5 oz)  Body mass index is 39.36 kg/(m^2).    Physical Exam  General: well developed, no distress  Head: normocephalic  Eyes:  conjunctivae/corneas clear  Nose: no discharge, no epistaxis  Neck: no carotid bruit  Heart: tachycardic  Abdomen: non-distended and bowel sounds normal  Extremities: no edema or cyanosis  Neurologic: Mental status: alert; oriented to person, place, time, situation  Cranial nerves: pupils are round at 3 mm; corneal resposnse present bilaterally; no facial asymmetry; protrudes tongue midline, no sensory deficits on face; protrudes tongue midline  Strength: 5/5 strength in left extremities and right extremities  Decreased light touch to left posterior forearm and hand   No dysmetria       Significant Labs:   CBC:   Recent Labs  Lab 03/03/17  1810 03/04/17  0509 03/05/17  0555   WBC 7.09 6.31 5.13   HGB 11.5* 10.4* 11.0*   HCT 36.9* 33.2* 35.8*    238 234     CMP:   Recent Labs  Lab 03/03/17  1810 03/04/17  0509 03/05/17  0555   * 157* 71    135* 136   K 3.5 3.4* 3.7   CL 97 99 102   CO2 29 27 24   BUN 34* 37* 35*   CREATININE 5.0* 4.7* 6.0*   CALCIUM 9.1 8.4* 8.5*   MG  --  2.1 2.3   PROT 7.6 6.8 7.0   ALBUMIN 3.6 3.2* 3.1*   BILITOT 0.6 0.6 0.5   ALKPHOS 82 74 71   AST 12 14 13   ALT 9* 10 9*   ANIONGAP 12 9 10   EGFRNONAA 9* 10* 7*       Significant Imaging:  Head  CT:    FINDINGS:    Gray-white differentiation is well-maintained.  There is no intracranial hemorrhage.  No mass or mass effect.  The ventricles are normal in size and configuration.    The calvarium is intact.Mild chronic paranasal sinus disease is noted.   Impression        No acute intracranial process.   No significant change.           Assessment and Plan:     55 y/o female consulted for left arm numbness:    1. Left arm numbness: suspect peripheral etiology of her symptoms given the patter. There may be intermittent compression of nerves vs a multilevel radicular involvement. Pt has preserved strength. Axillary bscess may had aggravated symptoms   -Avoid pressure in the LUE area. Pt could use a brace to avoid movents during sleep.   -If symptoms worsen ar weakness develops pt should see outpatient neurology.   -no other intervention for now. Continue gabapentin for neuropathy.    Active Diagnoses:    Diagnosis Date Noted POA    PRINCIPAL PROBLEM:  Febrile illness [R50.9] 03/03/2017 Yes    Abscess of axillary region [L02.419] 03/03/2017 Yes    ESRD needing dialysis [N18.6, Z99.2] 06/30/2016 Yes    Anemia of renal disease [D63.1] 10/23/2015 Yes     Chronic    Tobacco abuse [Z72.0] 06/16/2015 Yes     Chronic    Morbid obesity with BMI of 40.0-44.9, adult [E66.01, Z68.41] 06/16/2015 Not Applicable     Chronic    Paroxysmal atrial fibrillation [I48.0] 01/25/2015 Yes     Chronic    Coronary artery disease due to lipid rich plaque [I25.10, I25.83] 11/10/2014 Yes     Chronic    Type 2 diabetes mellitus with diabetic nephropathy [E11.21] 08/14/2014 Yes     Chronic    Essential hypertension [I10] 08/14/2014 Yes     Chronic    HLD (hyperlipidemia) [E78.5] 08/14/2014 Yes     Chronic    Hypothyroidism [E03.9] 08/14/2014 Yes     Chronic    Heart failure with preserved ejection fraction [I50.30] 08/14/2014 Yes     Chronic    Depression [F32.9] 08/14/2014 Yes     Chronic    Neuropathy [G62.9] 08/14/2014 Yes      Chronic      Problems Resolved During this Admission:    Diagnosis Date Noted Date Resolved POA       VTE Risk Mitigation         Ordered     apixaban tablet 2.5 mg  2 times daily     Route:  Oral        03/04/17 0133     Medium Risk of VTE  Once      03/04/17 0133          Thank you for your consult. I will follow-up with patient. Please contact us if you have any additional questions.    Logan Osborne MD  Neurology  [facility]

## 2017-03-05 NOTE — PLAN OF CARE
03/05/17 1453   Final Note   Assessment Type Final Discharge Note   Discharge Disposition Home   Discharge planning education complete? Yes   What phone number can be called within the next 1-3 days to see how you are doing after discharge? [phone]   Hospital Follow Up  Appt(s) scheduled? No   Discharge plans and expectations educations in teach back method with documentation complete? Yes   Offered [facility] -- Bedside Delivery? n/a   [facility] n/a   Referral to Outpatient Case Management complete? n/a   Referral to / orders for Home Health Complete? n/a   30 day supply of medicines given at discharge, if documented non-compliance / non-adherence? n/a   Any social issues identified prior to discharge? No   Did you assess the readiness or willingness of the family or caregiver to support self management of care? Yes   Right Care Referral Info   Post Acute Recommendation No Care

## 2017-03-05 NOTE — PLAN OF CARE
03/05/17 1103   Discharge Assessment   Assessment Type Discharge Planning Assessment   Confirmed/corrected address and phone number on facesheet? Yes   Assessment information obtained from? Patient   Type of Healthcare Directive Received (NA)   Prior to hospitilization cognitive status: Alert/Oriented   Prior to hospitalization functional status: Independent   Current cognitive status: Alert/Oriented   Current Functional Status: Assistive Equipment   Arrived From home or self-care   Lives With child(haley), adult   Able to Return to Prior Arrangements yes   Is patient able to care for self after discharge? Yes   How many people do you have in your home that can help with your care after discharge? 1   Who are your caregiver(s) and their phone number(s)? Daughter Amelie Donato [phone]   Patient's perception of discharge disposition home or selfcare   Readmission Within The Last 30 Days no previous admission in last 30 days   Patient currently being followed by outpatient case management? No   Patient currently receives home health services? No   Does the patient currently use HME? No   Patient currently receives private duty nursing? No   Patient currently receives any other outside agency services? No   Do you have any problems affording any of your prescribed medications? No   Is the patient taking medications as prescribed? yes   Do you have any financial concerns preventing you from receiving the healthcare you need? No   Does the patient have transportation to healthcare appointments? Yes   Transportation Available family or friend will provide   On Dialysis? Yes   If yes, what is the name of the dialysis unit? [facility] in [place].   Does the patient receive outpatient dialysis? Yes   Does the patient receive services at the [facility]? No   Are there any open cases? No   Discharge Plan A Home with family   Discharge Plan B Home with family   Patient/Family In Agreement With  Plan yes

## 2017-03-05 NOTE — ED NOTES
Patient evaluated per Dr Reed. Discussion for transfer with patient and family. All agree to proceed. Care of patient transferred to [facility] for transport to [facility] for treatment and dialysis services unable at [facility]. Vitals stable, appears in no distress

## 2017-03-05 NOTE — PROGRESS NOTES
Surgery    Pt. Reports feels better. Reports no drainage from left axillary area    Left axilla with 2cm area of mildly indurated folliculitis with no drainage; no cellulitis or fluctuance appreciated      Local care only.

## 2017-03-05 NOTE — PLAN OF CARE
Problem: Patient Care Overview  Goal: Plan of Care Review  Outcome: Ongoing (interventions implemented as appropriate)  Pt is free from falls. AAOx4. Pt ambulates in room independently. No family present at bedside. Pt is lethargic but alert and arousable. No breakdown noted. PIV is patent and intact. POC reviewed with patient and pt verbalized understanding. Bed in low position and siderails up x2. Pt oriented to call light. Will continue to monitor pt.

## 2017-03-05 NOTE — PLAN OF CARE
Problem: Patient Care Overview  Goal: Plan of Care Review  Outcome: Ongoing (interventions implemented as appropriate)  No falls this shift bed kept in low position call light and phone remain within reach bed alarm remain active. Patient able to reposition self in bed without assist.      Left axillary abscess noted drain prior, surgery consulted however, plan is to apply warm compress and monitor.   Vancomycin IV given 3/4/17 post HD. Patient d/c home with clindamycin PO q8hrs x 7 days.       Nephrology consulted   CORDELIA fistula positive thrill and bruit   3/4/17 HD 2.5L removed  3/5/17 BUN 35/ creatine 6.0

## 2017-03-06 LAB — HBV SURFACE AG SERPL QL IA: NEGATIVE

## 2017-03-06 NOTE — DISCHARGE SUMMARY
[facility]  Hospital Medicine  Discharge Summary      Patient Name: Caryn Mauricio  MRN: [id number]  Admission Date: 3/3/2017  Hospital Length of Stay: 2 days  Discharge Date and Time: 3/5/2017  4:04 PM  Attending Physician: Melissa Sargent MD  Discharging Provider: Melissa Sargent MD  Primary Care Provider: Travis Newell MD      HPI:   RE:  MRN [id number] Mauricio is a 56 y.o. female that (in part)  has a past medical history of AI (aortic insufficiency); Anemia; Anticoagulant long-term use; Asthma; CKD (chronic kidney disease) stage 4, GFR 15-29 ml/min (8/14/2014); Congestion of upper airway; Coronary artery disease; Depression (8/14/2014); Diabetes mellitus; Diabetes mellitus type 2 in obese (5/14/2015); Dialysis patient; Dyspnea; E. coli UTI (2014); Encounter for blood transfusion; Heart failure with preserved ejection fraction (8/14/2014); Hyperlipemia; Hypertension; Hypothyroidism (8/14/2014); Neuropathy (8/14/2014); PAF (paroxysmal atrial fibrillation); Shortness of breath; and Type 2 diabetes mellitus with diabetic nephropathy (8/14/2014). Presents to [facility] from [facility] complaining of 48 hours of fever.  Patient was noted to have continued fever while on dialysis yesterday.  She receives dialysis Tuesday, Thursday, and Saturday.  She had incision and drainage of an axillary abscess on the left on February 23.  Unfortunately this area continues to drain.  She denies being on antibiotics currently.  Associated symptoms include nausea with vomiting as well as a dull frontal headache.  Symptoms are of constant duration and progressively worsening.  Location is generalized.  Severity is moderate.  Frequency of one episode.    In the emergency department routine laboratory studies as well as blood cultures were obtained.  Other than the draining left axillary abscess, there is no obvious source of infection.  She was given a loading dose of  vancomycin and transferred to our facility for continued IV antibiotics as well as scheduled dialysis on Saturday, which was not available at [facility] on Saturdays.    Hospital medicine has been asked to admit for further evaluation and treatment.       * No surgery found *      Indwelling Lines/Drains at time of discharge:   Lines/Drains/Airways     Drain                 Hemodialysis AV Fistula Left upper arm -- days              Hospital Course:   Ms. Mauricio was admitted for fever due to left axilla abscess that was incompletely drained. Pt was treated with Vancomycin with dialysis empirically and she was evaluated by Surgery who felt that there was no need for further I&D given patient with only minimal drainage noted and it was recommended to treat with hot compresses. Pt never had fever after admission and blood cultures were NGTD, and the area surrounding the I&D site did not look infected. Pt was discharged with short course of Clindamycin to complete treatment. Finally, patient reported numbness to her left arm that was initially reported as new but later was found out to be chronic and ongoing for months. She was seen by Neurology who agreed with her chronic medication of gabapentin for her neuropathy.      Consults:   Consults         Status Ordering Provider     Inpatient consult to General Surgery  Once     Provider:  Larry Zuniga MD    Completed ALETHEA REZA     Inpatient consult to Neurology  Once     Provider:  Logan Osborne MD    Completed ALETHEA REZA          Significant Diagnostic Studies: Blood cultures NGTD    Pending Diagnostic Studies:     Procedure Component Value Units Date/Time    Hepatitis B Surface Antibody, Qual/Quant [[id number]] Collected:  03/04/17 1158    Order Status:  Sent Lab Status:  In process Updated:  03/04/17 1201    Specimen:  Blood from Blood     Narrative:       Dialysis RN to draw.  Receive In Basket/Push notification of result?->Yes    Hepatitis B surface  antigen [[id number]] Collected:  03/04/17 1158    Order Status:  Sent Lab Status:  In process Updated:  03/05/17 1153    Specimen:  Blood from Blood     Narrative:       Dialysis RN to draw.  Receive In Basket/Push notification of result?->Yes        Final Active Diagnoses:    Diagnosis Date Noted POA    PRINCIPAL PROBLEM:  Abscess of axillary region [L02.419] 03/03/2017 Yes    Paroxysmal atrial fibrillation [I48.0] 01/25/2015 Yes     Chronic    Coronary artery disease due to lipid rich plaque [I25.10, I25.83] 11/10/2014 Yes     Chronic    Heart failure with preserved ejection fraction [I50.30] 08/14/2014 Yes     Chronic    Essential hypertension [I10] 08/14/2014 Yes     Chronic    HLD (hyperlipidemia) [E78.5] 08/14/2014 Yes     Chronic    ESRD needing dialysis [N18.6, Z99.2] 06/30/2016 Yes    Febrile illness [R50.9] 03/03/2017 Yes    Anemia of renal disease [D63.1] 10/23/2015 Yes     Chronic    Tobacco abuse [Z72.0] 06/16/2015 Yes     Chronic    Morbid obesity with BMI of 40.0-44.9, adult [E66.01, Z68.41] 06/16/2015 Not Applicable     Chronic    Type 2 diabetes mellitus with diabetic nephropathy [E11.21] 08/14/2014 Yes     Chronic    Hypothyroidism [E03.9] 08/14/2014 Yes     Chronic    Depression [F32.9] 08/14/2014 Yes     Chronic    Neuropathy [G62.9] 08/14/2014 Yes     Chronic      Problems Resolved During this Admission:    Diagnosis Date Noted Date Resolved POA     Discharged Condition: good    Disposition: Home or Self Care    Follow Up:  Follow-up Information     Follow up with Resume dialysis In 1 week.        Follow up with Travis Newell MD In 1 week.    Specialty:  Internal Medicine    Contact information:    [address]  [place] [address] [phone]          Patient Instructions:     Diet general   Order Comments: Renal, [address] and cardiac.       Medications:  Reconciled Home Medications:   Discharge Medication List as of 3/5/2017  1:54 PM      START taking these medications     Details   clindamycin (CLEOCIN) 300 MG capsule Take 1 capsule (300 mg total) by mouth every 8 (eight) hours., Starting 3/5/2017, Until Sun 3/12/17, Print         CONTINUE these medications which have NOT CHANGED    Details   apixaban 2.5 mg Tab Take 1 tablet (2.5 mg total) by mouth 2 (two) times daily., Starting 2/17/2017, Until Mon 2/12/18, Normal      blood sugar diagnostic Strp 1 strip by Misc.(Non-Drug; Combo Route) route 2 hours after meals and at bedtime., Starting 1/15/2015, Until Discontinued, Print      carvedilol (COREG) 6.25 MG tablet Take 1 tablet (6.25 mg total) by mouth 2 (two) times daily with meals., Starting 2/17/2017, Until Discontinued, Normal      citalopram (CELEXA) 20 MG tablet Take 1 tablet (20 mg total) by mouth once daily., Starting 8/18/2016, Until Discontinued, Normal      docusate sodium (COLACE) 50 MG capsule Take 50 mg by mouth 2 (two) times daily as needed. OTC 1 tab every other day, Until Discontinued, Historical Med      ergocalciferol (ERGOCALCIFEROL) 50,000 unit Cap Take 1 capsule (50,000 Units total) by mouth every 7 days., Starting 2/17/2017, Until Discontinued, Normal      ferrous sulfate 325 (65 FE) MG EC tablet Take 1 tablet (325 mg total) by mouth 3 (three) times daily with meals., Starting 10/12/2016, Until Discontinued, OTC      furosemide (LASIX) 80 MG tablet Take 0.5 tablets (40 mg total) by mouth 2 (two) times daily., Starting 7/19/2016, Until Wed 7/19/17, Normal      gabapentin (NEURONTIN) 100 MG capsule Take 1 capsule (100 mg total) by mouth 3 (three) times daily., Starting 6/2/2016, Until Fri 6/2/17, Normal      hydrocodone-acetaminophen 10-325mg (NORCO)  mg Tab Take 1 tablet by mouth every 8 (eight) hours as needed for Pain., Starting 2/23/2017, Until Discontinued, Print      insulin aspart (NOVOLOG FLEXPEN) 100 unit/mL InPn pen Inject 14 Units into the skin 3 (three) times daily with meals. Titrate as directed, Starting 12/22/2016, Until Fri 12/22/17, Normal  "     insulin glargine (LANTUS SOLOSTAR) 100 unit/mL (3 mL) InPn pen Inject 40 Units into the skin every evening. Titrate as directed, Starting 12/5/2016, Until Tue 12/5/17, Normal      isosorbide mononitrate (IMDUR) 30 MG 24 hr tablet Take 1 tablet (30 mg total) by mouth once daily., Starting 2/17/2017, Until Sat 2/17/18, Normal      levothyroxine (SYNTHROID) 150 MCG tablet Take 1 tablet (150 mcg total) by mouth before breakfast., Starting 12/5/2016, Until Tue 12/5/17, Normal      ondansetron (ZOFRAN) 4 MG tablet Take 1 tablet (4 mg total) by mouth every 6 (six) hours as needed for Nausea., Starting 7/26/2014, Until Discontinued, Print      PEN NEEDLE 31 gauge x 1/4" Ndle USE TO INJECT INSULIN FOUR TIMES DAILY, Normal      pravastatin (PRAVACHOL) 20 MG tablet Take 1 tablet (20 mg total) by mouth every evening., Starting 2/17/2017, Until Sat 2/17/18, Normal      trazodone (DESYREL) 50 MG tablet Take 1 tablet (50 mg total) by mouth nightly as needed., Starting 2/17/2017, Until Discontinued, Normal         STOP taking these medications       aspirin (ECOTRIN) 81 MG EC tablet Comments:   Reason for Stopping:             Time spent on the discharge of patient: 45 minutes    Melissa Sargent MD  Department of Hospital Medicine  [facility]  "

## 2017-03-07 LAB
BACTERIA THROAT CULT: NORMAL
HEP. B SURF AB, QUAL: POSITIVE
HEP. B SURF AB, QUANT.: NORMAL MIU/ML

## 2017-03-08 LAB
BACTERIA BLD CULT: NORMAL
BACTERIA BLD CULT: NORMAL

## 2017-05-25 ENCOUNTER — HOSPITAL ENCOUNTER (EMERGENCY)
Facility: HOSPITAL | Age: 57
Discharge: HOME | End: 2017-05-26
Attending: FAMILY MEDICINE
Payer: MEDICARE

## 2017-05-25 VITALS
WEIGHT: 227 LBS | HEART RATE: 76 BPM | BODY MASS INDEX: 38.76 KG/M2 | RESPIRATION RATE: 16 BRPM | HEIGHT: 64 IN | OXYGEN SATURATION: 100 % | DIASTOLIC BLOOD PRESSURE: 68 MMHG | TEMPERATURE: 98 F | SYSTOLIC BLOOD PRESSURE: 139 MMHG

## 2017-05-25 DIAGNOSIS — B34.9 VIRAL SYNDROME: Primary | ICD-10-CM

## 2017-05-25 PROCEDURE — 99283 EMERGENCY DEPT VISIT LOW MDM: CPT

## 2017-05-25 RX ORDER — TRAMADOL HYDROCHLORIDE 50 MG/1
50 TABLET ORAL
Status: COMPLETED | OUTPATIENT
Start: 2017-05-26 | End: 2017-05-26

## 2017-05-26 LAB
ALBUMIN SERPL BCP-MCNC: 3.4 G/DL
ALP SERPL-CCNC: 77 U/L
ALT SERPL W/O P-5'-P-CCNC: 21 U/L
ANION GAP SERPL CALC-SCNC: 9 MMOL/L
AST SERPL-CCNC: 17 U/L
BASOPHILS # BLD AUTO: 0.01 K/UL
BASOPHILS NFR BLD: 0.2 %
BILIRUB SERPL-MCNC: 0.5 MG/DL
BUN SERPL-MCNC: 26 MG/DL
CALCIUM SERPL-MCNC: 9.2 MG/DL
CHLORIDE SERPL-SCNC: 97 MMOL/L
CO2 SERPL-SCNC: 34 MMOL/L
CREAT SERPL-MCNC: 3.4 MG/DL
DIFFERENTIAL METHOD: ABNORMAL
EOSINOPHIL # BLD AUTO: 0.1 K/UL
EOSINOPHIL NFR BLD: 1.9 %
ERYTHROCYTE [DISTWIDTH] IN BLOOD BY AUTOMATED COUNT: 16 %
EST. GFR  (AFRICAN AMERICAN): 17 ML/MIN/1.73 M^2
EST. GFR  (NON AFRICAN AMERICAN): 14 ML/MIN/1.73 M^2
FLUAV AG SPEC QL IA: NEGATIVE
FLUBV AG SPEC QL IA: NEGATIVE
GLUCOSE SERPL-MCNC: 345 MG/DL
HCT VFR BLD AUTO: 37.3 %
HGB BLD-MCNC: 11.5 G/DL
LYMPHOCYTES # BLD AUTO: 1.2 K/UL
LYMPHOCYTES NFR BLD: 20.9 %
MCH RBC QN AUTO: 27.8 PG
MCHC RBC AUTO-ENTMCNC: 30.8 %
MCV RBC AUTO: 90 FL
MONOCYTES # BLD AUTO: 0.5 K/UL
MONOCYTES NFR BLD: 8.4 %
NEUTROPHILS # BLD AUTO: 4.1 K/UL
NEUTROPHILS NFR BLD: 68.6 %
PLATELET # BLD AUTO: 162 K/UL
PMV BLD AUTO: 10.2 FL
POTASSIUM SERPL-SCNC: 3.7 MMOL/L
PROT SERPL-MCNC: 7.4 G/DL
RBC # BLD AUTO: 4.13 M/UL
SODIUM SERPL-SCNC: 140 MMOL/L
SPECIMEN SOURCE: NORMAL
WBC # BLD AUTO: 5.94 K/UL

## 2017-05-26 PROCEDURE — 87400 INFLUENZA A/B EACH AG IA: CPT | Mod: 59

## 2017-05-26 PROCEDURE — 25000003 PHARM REV CODE 250: Performed by: FAMILY MEDICINE

## 2017-05-26 PROCEDURE — 85025 COMPLETE CBC W/AUTO DIFF WBC: CPT

## 2017-05-26 PROCEDURE — 80053 COMPREHEN METABOLIC PANEL: CPT

## 2017-05-26 PROCEDURE — 36415 COLL VENOUS BLD VENIPUNCTURE: CPT

## 2017-05-26 RX ORDER — TRAMADOL HYDROCHLORIDE 50 MG/1
50 TABLET ORAL EVERY 6 HOURS PRN
Qty: 12 TABLET | Refills: 0 | Status: SHIPPED | OUTPATIENT
Start: 2017-05-26 | End: 2017-06-05

## 2017-05-26 RX ADMIN — TRAMADOL HYDROCHLORIDE 50 MG: 50 TABLET, COATED ORAL at 12:05

## 2017-05-26 NOTE — PROVIDER PROGRESS NOTES - EMERGENCY DEPT.
Encounter Date: 5/25/2017    ED Physician Progress Notes           Patient is laying flat in bed resting.  But she still claims she is feeling miserable with his total body aches.

## 2017-05-26 NOTE — ED PROVIDER NOTES
Encounter Date: 5/25/2017       History     Chief Complaint   Patient presents with    Leg Pain     and back pain with right wrist pain     Review of patient's allergies indicates:   Allergen Reactions    Iodine and iodide containing products Rash     The history is provided by the patient. No  was used.   General Illness    The current episode started today. The problem has been unchanged. The pain is at a severity of 3/10. Nothing relieves the symptoms. Nothing aggravates the symptoms. Associated symptoms include muscle aches and cough (Dry). Pertinent negatives include no fever, no decreased vision, no abdominal pain, no constipation, no diarrhea, no nausea, no vomiting, no stridor, no swollen glands, no neck stiffness, no shortness of breath, no URI and no wheezing. Recent Medical Care: Dialysis today.     Patient had dialysis today and felt fine and this evening she started to hurt all over.  The pain started with her legs and then became diffuse body pain.  No fever or chills.  No nausea or vomiting.  She's also had some pain in her right wrist for 3 or 4 months.  She has a dry cough.  No diarrhea.    Past Medical History:   Diagnosis Date    AI (aortic insufficiency)     mild    Anemia     Anticoagulant long-term use     coumadin    Asthma     CKD (chronic kidney disease) stage 4, GFR 15-29 ml/min 8/14/2014    Congestion of upper airway     Coronary artery disease     non obstructive    Depression 8/14/2014    Diabetes mellitus     Diabetes mellitus type 2 in obese 5/14/2015    Dialysis patient     Dyspnea     E. coli UTI 2014    Encounter for blood transfusion     Heart failure with preserved ejection fraction 8/14/2014    Hyperlipemia     Hypertension     Hypothyroidism 8/14/2014    Neuropathy 8/14/2014    PAF (paroxysmal atrial fibrillation)     Shortness of breath     Type 2 diabetes mellitus with diabetic nephropathy 8/14/2014     Past Surgical History:    Procedure Laterality Date    ABSCESS DRAINAGE Left     groin    AV FISTULA PLACEMENT, BRACHIOCEPHALIC Left 1/11/2016    CARDIAC CATHETERIZATION  2/17/14, 7/24/09    non-obstructive CAD in RCA    COLONOSCOPY N/A 9/17/2015    Procedure: COLONOSCOPY;  Surgeon: Sorin Schneider MD;  Location: [facility];  Service: Endoscopy;  Laterality: N/A;    COLONOSCOPY N/A 3/8/2016    Procedure: COLONOSCOPY;  Surgeon: Luis Bogran-Reyes, MD;  Location: [facility];  Service: Endoscopy;  Laterality: N/A;    HYSTERECTOMY  partial    TONSILLECTOMY      TUBAL LIGATION      UPPER GASTROINTESTINAL ENDOSCOPY       Family History   Problem Relation Age of Onset    Diabetes Mother     Asthma Father     Emphysema Father     Alcohol abuse Sister     Diabetes Brother     Asthma Sister     Cancer Sister     Throat cancer Sister     Diabetes Brother      Social History   Substance Use Topics    Smoking status: Current Some Day Smoker     Packs/day: 0.12     Years: 28.00     Types: Cigarettes     Start date: 1/19/1987    Smokeless tobacco: Never Used    Alcohol use No     Review of Systems   Constitutional: Negative for fever.   Respiratory: Positive for cough (Dry). Negative for shortness of breath, wheezing and stridor.    Gastrointestinal: Negative for abdominal pain, constipation, diarrhea, nausea and vomiting.       Physical Exam     Initial Vitals [05/25/17 2335]   BP Pulse Resp Temp SpO2   139/68 76 16 98 °F (36.7 °C) 100 %     Physical Exam    Nursing note and vitals reviewed.  Constitutional: She appears well-developed and well-nourished.   Obese female in no distress.   HENT:   Head: Normocephalic and atraumatic.   Right Ear: External ear normal.   Left Ear: External ear normal.   Nose: Nose normal.   Mouth/Throat: Oropharynx is clear and moist.   Eyes: Conjunctivae and EOM are normal. Pupils are equal, round, and reactive to light.   Neck: Normal range of motion. Neck supple.   Cardiovascular: Normal rate, regular  rhythm and normal heart sounds.   Pulmonary/Chest: Breath sounds normal.   Abdominal: Soft. Bowel sounds are normal.   Musculoskeletal: Normal range of motion. She exhibits no edema.        Right wrist: Normal.   Neurological: She is alert and oriented to person, place, and time. She has normal strength.   Skin: Skin is warm and dry.   Psychiatric: She has a normal mood and affect. Her behavior is normal. Judgment and thought content normal.         ED Course   Procedures  Labs Reviewed   INFLUENZA A AND B ANTIGEN                               ED Course     Clinical Impression:   The encounter diagnosis was Viral syndrome.          Ricardo Espinoza MD  05/26/17 0219

## 2017-05-26 NOTE — PROVIDER PROGRESS NOTES - EMERGENCY DEPT.
Encounter Date: 5/25/2017    ED Physician Progress Notes           Patient had fallen asleep.  She will rest for a while longer and then go home with analgesics.

## 2017-08-12 PROBLEM — R50.9 FEBRILE ILLNESS: Status: RESOLVED | Noted: 2017-03-03 | Resolved: 2017-08-12

## 2017-08-12 PROBLEM — R07.9 CHEST PAIN: Status: ACTIVE | Noted: 2017-08-12

## 2017-08-12 PROBLEM — Z87.09 HISTORY OF ASTHMA: Status: ACTIVE | Noted: 2017-08-12

## 2017-08-12 PROBLEM — Z72.0 TOBACCO ABUSE: Status: ACTIVE | Noted: 2017-08-12

## 2017-08-12 PROBLEM — I48.0 PAROXYSMAL A-FIB: Status: ACTIVE | Noted: 2017-08-12

## 2017-08-14 PROBLEM — I48.0 PAROXYSMAL A-FIB: Status: ACTIVE | Noted: 2017-08-14

## 2017-08-14 PROBLEM — Z72.0 TOBACCO ABUSE: Status: ACTIVE | Noted: 2017-08-14

## 2017-10-09 ENCOUNTER — HOSPITAL ENCOUNTER (EMERGENCY)
Facility: HOSPITAL | Age: 57
Discharge: ANOTHER HOSPITAL | End: 2017-10-09
Attending: SURGERY
Payer: MEDICARE

## 2017-10-09 VITALS
WEIGHT: 235 LBS | HEIGHT: 64 IN | RESPIRATION RATE: 21 BRPM | HEART RATE: 79 BPM | DIASTOLIC BLOOD PRESSURE: 63 MMHG | TEMPERATURE: 97 F | SYSTOLIC BLOOD PRESSURE: 159 MMHG | OXYGEN SATURATION: 95 % | BODY MASS INDEX: 40.12 KG/M2

## 2017-10-09 DIAGNOSIS — R07.9 CHEST PAIN, UNSPECIFIED TYPE: ICD-10-CM

## 2017-10-09 DIAGNOSIS — R79.89 ELEVATED TROPONIN: Primary | ICD-10-CM

## 2017-10-09 DIAGNOSIS — R06.02 SOB (SHORTNESS OF BREATH): ICD-10-CM

## 2017-10-09 PROBLEM — I21.A1 NON-ST ELEVATION MYOCARDIAL INFARCTION (NSTEMI), TYPE 2: Status: ACTIVE | Noted: 2017-10-09

## 2017-10-09 PROBLEM — J81.0 ACUTE PULMONARY EDEMA: Status: ACTIVE | Noted: 2017-10-09

## 2017-10-09 LAB
ALBUMIN SERPL BCP-MCNC: 3.4 G/DL
ALP SERPL-CCNC: 77 U/L
ALT SERPL W/O P-5'-P-CCNC: 20 U/L
ANION GAP SERPL CALC-SCNC: 8 MMOL/L
APTT BLDCRRT: 25.8 SEC
AST SERPL-CCNC: 17 U/L
BACTERIA #/AREA URNS HPF: ABNORMAL /HPF
BASOPHILS # BLD AUTO: 0.01 K/UL
BASOPHILS NFR BLD: 0.2 %
BILIRUB SERPL-MCNC: 0.9 MG/DL
BILIRUB UR QL STRIP: NEGATIVE
BNP SERPL-MCNC: 478 PG/ML
BUN SERPL-MCNC: 60 MG/DL
CALCIUM SERPL-MCNC: 8.4 MG/DL
CHLORIDE SERPL-SCNC: 108 MMOL/L
CK MB SERPL-MCNC: 3 NG/ML
CK MB SERPL-MCNC: 3 NG/ML
CK MB SERPL-RTO: 2.2 %
CK MB SERPL-RTO: 2.3 %
CK SERPL-CCNC: 129 U/L
CK SERPL-CCNC: 129 U/L
CK SERPL-CCNC: 135 U/L
CK SERPL-CCNC: 135 U/L
CLARITY UR: CLEAR
CO2 SERPL-SCNC: 24 MMOL/L
COLOR UR: YELLOW
CREAT SERPL-MCNC: 3.6 MG/DL
DIFFERENTIAL METHOD: ABNORMAL
EOSINOPHIL # BLD AUTO: 0.2 K/UL
EOSINOPHIL NFR BLD: 2.4 %
ERYTHROCYTE [DISTWIDTH] IN BLOOD BY AUTOMATED COUNT: 18.3 %
EST. GFR  (AFRICAN AMERICAN): 15 ML/MIN/1.73 M^2
EST. GFR  (NON AFRICAN AMERICAN): 13 ML/MIN/1.73 M^2
GLUCOSE SERPL-MCNC: 273 MG/DL
GLUCOSE UR QL STRIP: ABNORMAL
HCT VFR BLD AUTO: 33.2 %
HGB BLD-MCNC: 10.4 G/DL
HGB UR QL STRIP: ABNORMAL
HYALINE CASTS #/AREA URNS LPF: 1 /LPF
INR PPP: 1
KETONES UR QL STRIP: NEGATIVE
LEUKOCYTE ESTERASE UR QL STRIP: NEGATIVE
LYMPHOCYTES # BLD AUTO: 1.3 K/UL
LYMPHOCYTES NFR BLD: 21.9 %
MAGNESIUM SERPL-MCNC: 2.3 MG/DL
MCH RBC QN AUTO: 29.5 PG
MCHC RBC AUTO-ENTMCNC: 31.3 G/DL
MCV RBC AUTO: 94 FL
MICROSCOPIC COMMENT: ABNORMAL
MONOCYTES # BLD AUTO: 0.4 K/UL
MONOCYTES NFR BLD: 6.9 %
NEUTROPHILS # BLD AUTO: 4.2 K/UL
NEUTROPHILS NFR BLD: 68.6 %
NITRITE UR QL STRIP: NEGATIVE
PH UR STRIP: 6 [PH] (ref 5–8)
PHOSPHATE SERPL-MCNC: 2.2 MG/DL
PLATELET # BLD AUTO: 174 K/UL
PMV BLD AUTO: 10.3 FL
POTASSIUM SERPL-SCNC: 4.2 MMOL/L
PROT SERPL-MCNC: 7.2 G/DL
PROT UR QL STRIP: ABNORMAL
PROTHROMBIN TIME: 10.2 SEC
RBC # BLD AUTO: 3.52 M/UL
RBC #/AREA URNS HPF: 33 /HPF (ref 0–4)
SODIUM SERPL-SCNC: 140 MMOL/L
SP GR UR STRIP: 1.02 (ref 1–1.03)
SQUAMOUS #/AREA URNS HPF: 2 /HPF
TROPONIN I SERPL DL<=0.01 NG/ML-MCNC: 0.03 NG/ML
TROPONIN I SERPL DL<=0.01 NG/ML-MCNC: 0.04 NG/ML
URN SPEC COLLECT METH UR: ABNORMAL
UROBILINOGEN UR STRIP-ACNC: NEGATIVE EU/DL
WBC # BLD AUTO: 6.13 K/UL
WBC #/AREA URNS HPF: 1 /HPF (ref 0–5)
YEAST URNS QL MICRO: ABNORMAL

## 2017-10-09 PROCEDURE — 80053 COMPREHEN METABOLIC PANEL: CPT

## 2017-10-09 PROCEDURE — 27000221 HC OXYGEN, UP TO 24 HOURS

## 2017-10-09 PROCEDURE — 94760 N-INVAS EAR/PLS OXIMETRY 1: CPT

## 2017-10-09 PROCEDURE — 94640 AIRWAY INHALATION TREATMENT: CPT

## 2017-10-09 PROCEDURE — 81000 URINALYSIS NONAUTO W/SCOPE: CPT

## 2017-10-09 PROCEDURE — 84484 ASSAY OF TROPONIN QUANT: CPT | Mod: 91

## 2017-10-09 PROCEDURE — 36556 INSERT NON-TUNNEL CV CATH: CPT

## 2017-10-09 PROCEDURE — 83880 ASSAY OF NATRIURETIC PEPTIDE: CPT

## 2017-10-09 PROCEDURE — 82553 CREATINE MB FRACTION: CPT | Mod: 91

## 2017-10-09 PROCEDURE — 25000003 PHARM REV CODE 250: Performed by: SURGERY

## 2017-10-09 PROCEDURE — 99285 EMERGENCY DEPT VISIT HI MDM: CPT | Mod: 25

## 2017-10-09 PROCEDURE — 36415 COLL VENOUS BLD VENIPUNCTURE: CPT

## 2017-10-09 PROCEDURE — 85025 COMPLETE CBC W/AUTO DIFF WBC: CPT

## 2017-10-09 PROCEDURE — 25000242 PHARM REV CODE 250 ALT 637 W/ HCPCS: Performed by: SURGERY

## 2017-10-09 PROCEDURE — 94761 N-INVAS EAR/PLS OXIMETRY MLT: CPT

## 2017-10-09 PROCEDURE — 84100 ASSAY OF PHOSPHORUS: CPT

## 2017-10-09 PROCEDURE — 83735 ASSAY OF MAGNESIUM: CPT

## 2017-10-09 PROCEDURE — 85610 PROTHROMBIN TIME: CPT

## 2017-10-09 PROCEDURE — 85730 THROMBOPLASTIN TIME PARTIAL: CPT

## 2017-10-09 PROCEDURE — 93010 ELECTROCARDIOGRAM REPORT: CPT | Mod: ,,, | Performed by: INTERNAL MEDICINE

## 2017-10-09 PROCEDURE — 93005 ELECTROCARDIOGRAM TRACING: CPT | Mod: 59

## 2017-10-09 RX ORDER — PANTOPRAZOLE SODIUM 40 MG/1
40 TABLET, DELAYED RELEASE ORAL
Status: COMPLETED | OUTPATIENT
Start: 2017-10-09 | End: 2017-10-09

## 2017-10-09 RX ORDER — ATORVASTATIN CALCIUM 40 MG/1
40 TABLET, FILM COATED ORAL
Status: COMPLETED | OUTPATIENT
Start: 2017-10-09 | End: 2017-10-09

## 2017-10-09 RX ORDER — IPRATROPIUM BROMIDE AND ALBUTEROL SULFATE 2.5; .5 MG/3ML; MG/3ML
3 SOLUTION RESPIRATORY (INHALATION)
Status: COMPLETED | OUTPATIENT
Start: 2017-10-09 | End: 2017-10-09

## 2017-10-09 RX ADMIN — IPRATROPIUM BROMIDE AND ALBUTEROL SULFATE 3 ML: .5; 3 SOLUTION RESPIRATORY (INHALATION) at 01:10

## 2017-10-09 RX ADMIN — NITROGLYCERIN 0.5 INCH: 20 OINTMENT TOPICAL at 05:10

## 2017-10-09 RX ADMIN — PANTOPRAZOLE SODIUM 40 MG: 40 TABLET, DELAYED RELEASE ORAL at 05:10

## 2017-10-09 RX ADMIN — ATORVASTATIN CALCIUM 40 MG: 40 TABLET, FILM COATED ORAL at 05:10

## 2017-10-09 RX ADMIN — IPRATROPIUM BROMIDE AND ALBUTEROL SULFATE 3 ML: .5; 3 SOLUTION RESPIRATORY (INHALATION) at 04:10

## 2017-10-09 NOTE — ED NOTES
Received verbal report from YOMI Guan. Stretcher placed in low, locked position, side rails up x2, HOB 30 degrees.  Pt remains connected to continuous cardiac monitor, automatic BP cuff, and continuous pulse ox monitor. Call light is within reach of patient/family, instructed on use, pt/family verbalize understanding, explanation of care provided to family and patient, alarms set and turned on for monitor, pulse ox , plan of care: family to bedside, observe and reassure, position of comfort, respirations even and unlabored, patient offers no complaints at this time, will continue to monitor.

## 2017-10-09 NOTE — ED NOTES
Pt connected to continuous cardiac monitor, automatic BP cuff, and continuous pulse ox monitor.  Will continue to monitor pt.

## 2017-10-09 NOTE — ED PROVIDER NOTES
[facility]                                     October 9, 2017                   Chief Complaint  57 y.o. female with Shortness of Breath    History of Present Illness  Caryn Mauricio presents to the emergency room with chest pain and shortness of breath  Patient states she has not felt well since missing dialysis yesterday, coughing fit prior travel  Has not felt well with chest pressure and shortness of breath, normal sinus rhythm on EKG  Patient has a known cardiac history with aortic insufficiency, also has a history of CHF noted  Patient does not appear to be fluid overloaded, states she does not feel well, not herself    The history is provided by the patient     Past Medical History   -- AI (aortic insufficiency)      -- Anemia      -- Anticoagulant long-term use      -- Asthma      -- CKD (chronic kidney disease) stage 4, GFR 15-29 ml/min      -- Congestion of upper airway      -- Coronary artery disease      -- Depression      -- Diabetes mellitus      -- Diabetes mellitus type 2 in obese      -- Dyspnea      -- E. coli UTI      -- Encounter for blood transfusion      -- Heart failure with preserved ejection fraction      -- Hyperlipemia      -- Hypertension      -- Hypothyroidism      -- Neuropathy      -- PAF (paroxysmal atrial fibrillation)      -- Shortness of breath      -- Type 2 diabetes mellitus with diabetic nephropathy          Past Surgical History   -- Tubal ligation         -- Hysterectomy         -- Tonsillectomy         -- Abscess drainage         -- Colonoscopy         -- Cardiac catheterization         -- Av fistula placement, brachiocephalic         -- Colonoscopy         -- Upper gastrointestinal endoscopy             Allergies   -- Iodine And Iodide Containing Products      Review of Systems and Physical Exam     Review of Systems  -- Constitution - no fever, denies fatigue, no weakness, no chills  -- Eyes - no tearing or redness, no visual disturbance  -- Ear,  Nose - no tinnitus or earache, no nasal congestion or discharge  -- Mouth,Throat - no sore throat, no toothache, normal voice, normal swallowing  -- Respiratory - shortness of breath, cough, chest wall pain  -- Cardiovascular - chest pain, no palpitations, denies claudication  -- Gastrointestinal - denies abdominal pain, nausea, vomiting, or diarrhea  -- Genitourinary - no dysuria, no denies flank pain, no hematuria or frequency   -- Musculoskeletal - denies back pain, negative for myalgias and arthralgias   -- Neurological - dizziness, no headache, denies weakness or seizure  -- Skin - denies pallor, rash, or changes in skin. no hives or welts noted    Vital Signs  -- Her tympanic temperature is 96.7 °F (35.9 °C).   -- Her blood pressure is 111/82 and her pulse is 80.   -- Her respiration is 21 and oxygen saturation is 96%.      Physical Exam  -- Nursing note and vitals reviewed  -- Head: Atraumatic. Normocephalic. No obvious abnormality  -- Eyes: Pupils are equal and reactive to light. Normal conjunctiva and lids  -- Cardiac: Normal rate, regular rhythm and normal heart sounds  -- Pulmonary: Normal respiratory effort, breath sounds clear to auscultation  -- Abdominal: Soft, no tenderness. Normal bowel sounds. Normal liver edge  -- Musculoskeletal: Normal range of motion, no effusions. Joints stable   -- Neurological: No focal deficits. Showed good interaction with staff  -- Vascular: Posterior tibial, dorsalis pedis and radial pulses 2+ bilaterally      Emergency Room Course     Lab values  --    -- K 4.2   --    -- CO2 24   -- BUN 60 (H)   -- CREATININE 3.6 (H)   --  (H)   -- ALKPHOS 77   -- AST 17   -- ALT 20   -- BILITOT 0.9   -- ALBUMIN 3.4 (L)   -- PROT 7.2   -- WBC 6.13   -- HGB 10.4 (L)   -- HCT 33.2 (L)   --    --    --    -- CPKMB 3.0   -- TROPONINI 0.039 (H)   -- INR 1.0   --  (H)     EKG  -- The EKG findings today were without concerning findings from  baseline    Radiology  -- The CT of the head performed in the ER today was negative for acute pathology  -- Chest x-ray showed no infiltrate and showed no acute pathology     Central Line  -- Performed by: Sunny Reed MD  -- Date/Time: 6:54 AM 10/9/2017   -- Consent Done: Emergent Situation  -- Indications: vascular access  -- Anesthesia: local infiltration  -- Local anesthetic: lidocaine 1% without epinephrine  -- Anesthetic total: 5 ml  -- Preparation: skin prepped with ChloraPrep  -- Location details: right femoral  -- Catheter type: triple lumen  -- Catheter size: 7.5 Fr  -- Number of attempts: 1  -- Post-procedure: line sutured  -- Complications: none    Medications Given  -- albuterol-ipratropium 2.5mg-0.5mg/3mL nebulizer solution 3 mL    -- albuterol-ipratropium 2.5mg-0.5mg/3mL nebulizer solution 3 mL   -- nitroGLYCERIN 2% TD oint ointment 0.5 inch (0.5 inches Topical (Top) Given 10/9/17 0520)   -- pantoprazole EC tablet 40 mg (40 mg Oral Given 10/9/17 0520)   -- atorvastatin tablet 40 mg (40 mg Oral Given 10/9/17 0521)     Medical Decision Making  -- Diagnosis management comments: 57 y.o. female with chest pain this evening at home  -- Patient missed dialysis on Saturday, does not appear to be fluid overloaded on exam today  -- Second troponin high at the first, patient does not feel well despite NSR on EKG today  -- Will transfer to high level of care for cardiology and nephrology    Diagnosis  -- Elevated troponin  -- SOB (shortness of breath)   -- Chest pain    Disposition and Plan  -- Disposition: transfer  -- Condition: stable  -- The patient needs a higher level of care  -- The patient is appropriate for transfer    This note is dictated on Dragon Natural Speaking word recognition program.  There are word recognition mistakes that are occasionally missed on review.                         Sunny Reed MD  10/09/17 0608

## 2017-10-10 PROBLEM — M89.8X9 METABOLIC BONE DISEASE: Status: ACTIVE | Noted: 2017-10-10

## 2017-10-13 PROBLEM — R05.9 COUGH: Status: ACTIVE | Noted: 2017-10-13

## 2017-10-13 PROBLEM — J40 BRONCHITIS: Status: ACTIVE | Noted: 2017-10-13

## 2017-10-13 PROBLEM — J20.9 ACUTE BRONCHITIS: Status: ACTIVE | Noted: 2017-10-13

## 2017-11-01 ENCOUNTER — TELEPHONE (OUTPATIENT)
Dept: ADMINISTRATIVE | Facility: HOSPITAL | Age: 57
End: 2017-11-01

## 2017-11-03 ENCOUNTER — TELEPHONE (OUTPATIENT)
Dept: ADMINISTRATIVE | Facility: HOSPITAL | Age: 57
End: 2017-11-03

## 2017-11-13 PROBLEM — R00.2 PALPITATIONS: Status: ACTIVE | Noted: 2017-11-13

## 2017-11-13 PROBLEM — I36.1 NON-RHEUMATIC TRICUSPID VALVE INSUFFICIENCY: Status: ACTIVE | Noted: 2017-11-13

## 2017-11-13 PROBLEM — I48.0 PAROXYSMAL A-FIB: Status: RESOLVED | Noted: 2017-08-14 | Resolved: 2017-11-13

## 2017-11-13 PROBLEM — I21.A1 NON-ST ELEVATION MYOCARDIAL INFARCTION (NSTEMI), TYPE 2: Status: RESOLVED | Noted: 2017-10-09 | Resolved: 2017-11-13

## 2017-11-13 PROBLEM — Z99.2 ESRD (END STAGE RENAL DISEASE) ON DIALYSIS: Status: ACTIVE | Noted: 2017-11-13

## 2017-11-13 PROBLEM — E66.01 MORBID OBESITY: Status: ACTIVE | Noted: 2017-11-13

## 2017-11-13 PROBLEM — R06.09 DYSPNEA ON EXERTION: Status: ACTIVE | Noted: 2017-11-13

## 2017-11-13 PROBLEM — I48.0 PAROXYSMAL ATRIAL FIBRILLATION: Status: ACTIVE | Noted: 2017-11-13

## 2017-11-13 PROBLEM — I50.30 HEART FAILURE WITH PRESERVED EJECTION FRACTION, BORDERLINE, CLASS III: Status: ACTIVE | Noted: 2017-11-13

## 2017-11-13 PROBLEM — N18.6 ESRD (END STAGE RENAL DISEASE) ON DIALYSIS: Status: ACTIVE | Noted: 2017-11-13

## 2017-11-13 PROBLEM — E78.5 HYPERLIPIDEMIA: Status: ACTIVE | Noted: 2017-11-13

## 2017-11-13 PROBLEM — I27.20 PULMONARY HYPERTENSION: Status: ACTIVE | Noted: 2017-11-13

## 2017-11-13 PROBLEM — I21.A1 NON-ST ELEVATION MYOCARDIAL INFARCTION (NSTEMI), TYPE 2: Status: ACTIVE | Noted: 2017-11-13

## 2017-11-28 PROBLEM — N18.6 ESRD ON HEMODIALYSIS: Status: ACTIVE | Noted: 2017-11-28

## 2017-11-28 PROBLEM — Z99.2 ESRD ON HEMODIALYSIS: Status: ACTIVE | Noted: 2017-11-28

## 2018-01-09 PROBLEM — R42 POSTURAL DIZZINESS WITH PRESYNCOPE: Status: ACTIVE | Noted: 2018-01-09

## 2018-01-09 PROBLEM — R53.82 CHRONIC FATIGUE: Status: ACTIVE | Noted: 2018-01-09

## 2018-01-09 PROBLEM — R55 POSTURAL DIZZINESS WITH PRESYNCOPE: Status: ACTIVE | Noted: 2018-01-09

## 2018-01-09 PROBLEM — R53.83 FATIGUE: Status: ACTIVE | Noted: 2018-01-09

## 2018-01-10 PROBLEM — R42 POSTURAL DIZZINESS WITH PRESYNCOPE: Status: RESOLVED | Noted: 2018-01-09 | Resolved: 2018-01-10

## 2018-01-10 PROBLEM — R55 POSTURAL DIZZINESS WITH PRESYNCOPE: Status: RESOLVED | Noted: 2018-01-09 | Resolved: 2018-01-10

## 2018-01-12 ENCOUNTER — TELEPHONE (OUTPATIENT)
Dept: ADMINISTRATIVE | Facility: HOSPITAL | Age: 58
End: 2018-01-12

## 2018-01-15 PROBLEM — J81.0 ACUTE PULMONARY EDEMA: Status: RESOLVED | Noted: 2017-10-09 | Resolved: 2018-01-15

## 2018-04-16 PROBLEM — R45.851 DEPRESSION WITH SUICIDAL IDEATION: Status: ACTIVE | Noted: 2018-04-16

## 2018-04-16 PROBLEM — F32.A DEPRESSION WITH SUICIDAL IDEATION: Status: ACTIVE | Noted: 2018-04-16

## 2018-05-14 PROBLEM — G72.0 STATIN MYOPATHY: Status: ACTIVE | Noted: 2018-05-14

## 2018-05-14 PROBLEM — I20.89 STABLE ANGINA: Status: ACTIVE | Noted: 2018-05-14

## 2018-05-14 PROBLEM — R06.09 DYSPNEA ON EXERTION: Status: RESOLVED | Noted: 2017-11-13 | Resolved: 2018-05-14

## 2018-05-14 PROBLEM — T46.6X5A STATIN MYOPATHY: Status: ACTIVE | Noted: 2018-05-14

## 2018-05-14 PROBLEM — R07.9 CHEST PAIN: Status: RESOLVED | Noted: 2017-08-12 | Resolved: 2018-05-14

## 2018-05-14 PROBLEM — I24.89 DEMAND ISCHEMIA OF MYOCARDIUM: Status: ACTIVE | Noted: 2018-05-14

## 2018-07-11 ENCOUNTER — TELEPHONE (OUTPATIENT)
Dept: ADMINISTRATIVE | Facility: HOSPITAL | Age: 58
End: 2018-07-11

## 2018-08-03 ENCOUNTER — HOSPITAL ENCOUNTER (EMERGENCY)
Facility: HOSPITAL | Age: 58
Discharge: ANOTHER HOSPITAL | End: 2018-08-03
Attending: SURGERY
Payer: MEDICARE

## 2018-08-03 VITALS
WEIGHT: 232 LBS | SYSTOLIC BLOOD PRESSURE: 162 MMHG | HEART RATE: 77 BPM | RESPIRATION RATE: 17 BRPM | BODY MASS INDEX: 39.61 KG/M2 | HEIGHT: 64 IN | OXYGEN SATURATION: 98 % | TEMPERATURE: 98 F | DIASTOLIC BLOOD PRESSURE: 61 MMHG

## 2018-08-03 DIAGNOSIS — R07.9 CHEST PAIN: ICD-10-CM

## 2018-08-03 LAB
ALBUMIN SERPL BCP-MCNC: NORMAL G/DL
ALP SERPL-CCNC: NORMAL U/L
ALT SERPL W/O P-5'-P-CCNC: NORMAL U/L
ANION GAP SERPL CALC-SCNC: NORMAL MMOL/L
APTT BLDCRRT: 26.8 SEC
AST SERPL-CCNC: NORMAL U/L
BASOPHILS # BLD AUTO: 0.01 K/UL
BASOPHILS NFR BLD: 0.1 %
BILIRUB SERPL-MCNC: NORMAL MG/DL
BNP SERPL-MCNC: 81 PG/ML
BUN SERPL-MCNC: NORMAL MG/DL
CALCIUM SERPL-MCNC: NORMAL MG/DL
CHLORIDE SERPL-SCNC: NORMAL MMOL/L
CK MB SERPL-MCNC: 2.5 NG/ML
CK MB SERPL-RTO: NORMAL %
CK SERPL-CCNC: NORMAL U/L
CK SERPL-CCNC: NORMAL U/L
CO2 SERPL-SCNC: NORMAL MMOL/L
CREAT SERPL-MCNC: NORMAL MG/DL
DIFFERENTIAL METHOD: NORMAL
EOSINOPHIL # BLD AUTO: 0.1 K/UL
EOSINOPHIL NFR BLD: 1.8 %
ERYTHROCYTE [DISTWIDTH] IN BLOOD BY AUTOMATED COUNT: 14.3 %
EST. GFR  (AFRICAN AMERICAN): NORMAL ML/MIN/1.73 M^2
EST. GFR  (NON AFRICAN AMERICAN): NORMAL ML/MIN/1.73 M^2
GLUCOSE SERPL-MCNC: NORMAL MG/DL
HCT VFR BLD AUTO: 38.9 %
HGB BLD-MCNC: 12.9 G/DL
INR PPP: 1
LYMPHOCYTES # BLD AUTO: 2.4 K/UL
LYMPHOCYTES NFR BLD: 31.3 %
MCH RBC QN AUTO: 29.4 PG
MCHC RBC AUTO-ENTMCNC: 33.2 G/DL
MCV RBC AUTO: 89 FL
MONOCYTES # BLD AUTO: 0.7 K/UL
MONOCYTES NFR BLD: 9 %
NEUTROPHILS # BLD AUTO: 4.4 K/UL
NEUTROPHILS NFR BLD: 57.8 %
PLATELET # BLD AUTO: 209 K/UL
PMV BLD AUTO: 11.1 FL
POTASSIUM SERPL-SCNC: NORMAL MMOL/L
PROT SERPL-MCNC: NORMAL G/DL
PROTHROMBIN TIME: 10.1 SEC
RBC # BLD AUTO: 4.39 M/UL
SODIUM SERPL-SCNC: NORMAL MMOL/L
TROPONIN I SERPL DL<=0.01 NG/ML-MCNC: 0.03 NG/ML
WBC # BLD AUTO: 7.69 K/UL

## 2018-08-03 PROCEDURE — 85730 THROMBOPLASTIN TIME PARTIAL: CPT

## 2018-08-03 PROCEDURE — 85025 COMPLETE CBC W/AUTO DIFF WBC: CPT

## 2018-08-03 PROCEDURE — 25000003 PHARM REV CODE 250: Performed by: SURGERY

## 2018-08-03 PROCEDURE — 80053 COMPREHEN METABOLIC PANEL: CPT

## 2018-08-03 PROCEDURE — 93005 ELECTROCARDIOGRAM TRACING: CPT

## 2018-08-03 PROCEDURE — 85610 PROTHROMBIN TIME: CPT

## 2018-08-03 PROCEDURE — 82550 ASSAY OF CK (CPK): CPT

## 2018-08-03 PROCEDURE — 83880 ASSAY OF NATRIURETIC PEPTIDE: CPT

## 2018-08-03 PROCEDURE — 84484 ASSAY OF TROPONIN QUANT: CPT | Mod: 91

## 2018-08-03 PROCEDURE — 99285 EMERGENCY DEPT VISIT HI MDM: CPT | Mod: 25

## 2018-08-03 PROCEDURE — 82553 CREATINE MB FRACTION: CPT

## 2018-08-03 PROCEDURE — 36415 COLL VENOUS BLD VENIPUNCTURE: CPT

## 2018-08-03 RX ORDER — PANTOPRAZOLE SODIUM 40 MG/1
40 TABLET, DELAYED RELEASE ORAL
Status: COMPLETED | OUTPATIENT
Start: 2018-08-03 | End: 2018-08-03

## 2018-08-03 RX ORDER — NAPROXEN SODIUM 220 MG/1
81 TABLET, FILM COATED ORAL
Status: COMPLETED | OUTPATIENT
Start: 2018-08-03 | End: 2018-08-03

## 2018-08-03 RX ORDER — ATORVASTATIN CALCIUM 40 MG/1
40 TABLET, FILM COATED ORAL
Status: COMPLETED | OUTPATIENT
Start: 2018-08-03 | End: 2018-08-03

## 2018-08-03 RX ADMIN — LIDOCAINE HYDROCHLORIDE 50 ML: 20 SOLUTION ORAL; TOPICAL at 09:08

## 2018-08-03 RX ADMIN — PANTOPRAZOLE SODIUM 40 MG: 40 TABLET, DELAYED RELEASE ORAL at 09:08

## 2018-08-03 RX ADMIN — ASPIRIN 81 MG 81 MG: 81 TABLET ORAL at 07:08

## 2018-08-03 RX ADMIN — ATORVASTATIN CALCIUM 40 MG: 40 TABLET, FILM COATED ORAL at 09:08

## 2018-08-03 RX ADMIN — NITROGLYCERIN 1 INCH: 20 OINTMENT TOPICAL at 09:08

## 2018-08-04 PROBLEM — R07.9 CHEST PAIN: Status: ACTIVE | Noted: 2018-08-04

## 2018-08-04 NOTE — ED PROVIDER NOTES
[facility]                                                 Chief Complaint  58 y.o. female with Chest Pain    History of Present Illness  Caryn Mauricio presents to the emergency room with chest pain tonight  Patient states she has acute chest pain that started 1 hour prior to arrival here  Patient is normal sinus rhythm on EKG without ST changes noted on evaluation  Patient has a known history of coronary artery disease and congestive heart failure  Patient also has aortic insufficiency and atrial fibrillation, heart rate 74 in the ER  Patient states the pain is 5/10 in intensity, relieved with nitroglycerin in the ER    The history is provided by the patient   device was not used during this ER visit    Past Medical History   -- AI (aortic insufficiency)      -- Anemia      -- Anticoagulant long-term use      -- Asthma      -- CKD (chronic kidney disease) stage 4, GFR 15-29 ml/min      -- Congestion of upper airway      -- Coronary artery disease      -- Depression      -- Diabetes mellitus      -- Diabetes mellitus type 2 in obese      -- Dyspnea      -- E. coli UTI      -- Encounter for blood transfusion      -- Heart failure with preserved ejection fraction      -- Hyperlipemia      -- Hypertension      -- Hypothyroidism      -- Neuropathy      -- PAF (paroxysmal atrial fibrillation)      -- Shortness of breath      -- Type 2 diabetes mellitus with diabetic nephropathy          Past Surgical History   -- Tubal ligation         -- Hysterectomy         -- Tonsillectomy         -- Abscess drainage         -- Colonoscopy         -- Cardiac catheterization         -- Av fistula placement, brachiocephalic         -- Colonoscopy         -- Upper gastrointestinal endoscopy             Allergies   -- Iodine And Iodide Containing Products      Review of Systems and Physical Exam      Review of Systems  -- Constitution - no fever, denies fatigue, no weakness, no chills  -- Eyes -  no tearing or redness, no visual disturbance  -- Ear, Nose - no tinnitus or earache, no nasal congestion or discharge  -- Mouth,Throat - no sore throat, no toothache, normal voice, normal swallowing  -- Respiratory - denies cough and congestion, no shortness of breath, no MOSQUEDA  -- Cardiovascular - chest pain, no palpitations, denies claudication  -- Gastrointestinal - denies abdominal pain, nausea, vomiting, or diarrhea  -- Genitourinary - no dysuria, denies flank pain, no hematuria, no STD risk  -- Musculoskeletal - denies back pain, negative for myalgias and arthralgias   -- Neurological - no headache, denies weakness or seizure; no LOC  -- Skin - denies pallor, rash, or changes in skin. no hives or welts noted    BP (!) 186/86   Pulse 74   Temp 97.6 °F  Resp 14     Physical Exam  -- Nursing note and vitals reviewed  -- Constitutional: Appears well-developed and well-nourished  -- Head: Atraumatic. Normocephalic. No obvious abnormality  -- Eyes: Pupils are equal and reactive to light. Normal conjunctiva and lids  -- Cardiac: Normal rate, regular rhythm and normal heart sounds  -- Pulmonary: Normal respiratory effort, breath sounds clear to auscultation  -- Abdominal: Soft, no tenderness. Normal bowel sounds. Normal liver edge  -- Musculoskeletal: Normal range of motion, no effusions. Joints stable   -- Neurological: No focal deficits. Showed good interaction with staff  -- Vascular: Posterior tibial, dorsalis pedis and radial pulses 2+ bilaterally      Emergency Room Course      Lab Results     K 4.3      CO2 26   BUN 54 (H)   CREATININE 3.8 (H)      ALKPHOS 87   AST 18   ALT 24   BILITOT 0.5   ALBUMIN 2.9 (L)   PROT 6.6   WBC 7.69   HGB 12.9   HCT 38.9      CPKMB 2.5   TROPONINI 0.033 (H)   INR 1.0   BNP 81   TSH 0.910     EKG  -- The EKG findings today were without concerning findings from baseline    Radiology  -- Chest x-ray showed no infiltrate and showed no acute  pathology    Medications Given  aspirin chewable tablet 81 mg (81 mg Oral Given 8/3/18 1949)   atorvastatin tablet 40 mg (40 mg Oral Given 8/3/18 2137)   pantoprazole EC tablet 40 mg (40 mg Oral Given 8/3/18 2138)   nitroGLYCERIN 2% TD oint ointment 1 inch (1 inch Topical (Top) Given 8/3/18 2138)   GI cocktail (mylanta 30 mL, lidocaine 2 % viscous 10 mL, dicyclomine 10 mL) 50 mL     Medical Decision Making  -- Diagnosis management comments: 58 y.o. female with chest pain this evening  -- slight elevation in troponin, improvement in chest pain with nitroglycerin tonight  -- patient has a known cardiac history, also need dialysis in the morning  -- will transfer to higher level of care for further evaluation    Diagnosis  -- The encounter diagnosis was Chest pain.    Disposition and Plan  -- Disposition: transfer  -- Condition: stable  -- The patient needs a higher level of care  -- The patient is appropriate for transfer    This note is dictated on Dragon Natural Speaking word recognition program.  There are word recognition mistakes that are occasionally missed on review.              Sunny Reed MD  08/03/18 1547

## 2018-08-04 NOTE — ED NOTES
Pt transferred to [facility] via [facility], VSS, report given to YOMI Dye. Pt transferred with chart printed, transfer form, face sheet, epic to epic.

## 2018-08-04 NOTE — ED TRIAGE NOTES
58 y.o. female presents to ER ED 04/ED 04   Chief Complaint   Patient presents with    Chest Pain   pt c/o of chest that started this AM. No acute distress noted.

## 2018-08-04 NOTE — ED NOTES
The patient is awake, alert and cooperative with a calm affect, patient is aware of environment. Airway is open and patent, respirations are spontaneous, normal respiratory effort and rate noted, skin warm and dry, full ROM in all extremities, appearance: no apparent distress noted, resting comfortably.  VSS, no change from previous assessment.  Bed in low, locked position, SR x2, HOB 30 degrees.  Pt able to change position independently. Will continue to monitor.

## 2018-08-15 PROBLEM — K31.84 GASTROPARESIS: Status: ACTIVE | Noted: 2018-08-15

## 2018-08-27 ENCOUNTER — TELEPHONE (OUTPATIENT)
Dept: SMOKING CESSATION | Facility: CLINIC | Age: 58
End: 2018-08-27

## 2018-08-27 NOTE — TELEPHONE ENCOUNTER
Called to see if keeping appt. Mailbox full Called [phone] person that answered phone said she was asleep.

## 2018-09-12 ENCOUNTER — TELEPHONE (OUTPATIENT)
Dept: SMOKING CESSATION | Facility: CLINIC | Age: 58
End: 2018-09-12

## 2018-09-17 ENCOUNTER — TELEPHONE (OUTPATIENT)
Dept: SMOKING CESSATION | Facility: CLINIC | Age: 58
End: 2018-09-17

## 2019-04-10 ENCOUNTER — PATIENT OUTREACH (OUTPATIENT)
Dept: ADMINISTRATIVE | Facility: HOSPITAL | Age: 59
End: 2019-04-10

## 2019-04-24 PROBLEM — K59.00 CONSTIPATION: Status: ACTIVE | Noted: 2019-04-24

## 2019-05-28 PROBLEM — I95.3 HYPOTENSION OF HEMODIALYSIS: Status: ACTIVE | Noted: 2019-05-28

## 2019-05-29 PROBLEM — N39.0 UTI (URINARY TRACT INFECTION): Status: ACTIVE | Noted: 2019-05-29

## 2019-06-10 PROBLEM — Z09 HOSPITAL DISCHARGE FOLLOW-UP: Status: ACTIVE | Noted: 2019-06-10

## 2019-07-08 PROBLEM — I50.30 HEART FAILURE WITH PRESERVED EJECTION FRACTION, NYHA CLASS II: Status: ACTIVE | Noted: 2019-07-08

## 2019-07-08 PROBLEM — E11.65 HYPERGLYCEMIA DUE TO TYPE 2 DIABETES MELLITUS: Status: ACTIVE | Noted: 2019-07-08

## 2019-07-15 PROBLEM — R00.2 PALPITATIONS: Status: RESOLVED | Noted: 2017-11-13 | Resolved: 2019-07-15

## 2019-07-15 PROBLEM — I50.30 HEART FAILURE WITH PRESERVED EJECTION FRACTION, BORDERLINE, CLASS III: Status: RESOLVED | Noted: 2017-11-13 | Resolved: 2019-07-15

## 2019-07-15 PROBLEM — I25.10 CORONARY ARTERY DISEASE: Status: ACTIVE | Noted: 2019-07-15

## 2019-07-15 PROBLEM — I25.9 MYOCARDIAL ISCHEMIA: Status: ACTIVE | Noted: 2019-07-15

## 2019-07-15 PROBLEM — I51.89 DIASTOLIC DYSFUNCTION: Status: ACTIVE | Noted: 2019-07-15

## 2019-07-16 ENCOUNTER — TELEPHONE (OUTPATIENT)
Dept: PHARMACY | Facility: CLINIC | Age: 59
End: 2019-07-16

## 2019-07-16 NOTE — TELEPHONE ENCOUNTER
No answer/VM to inform patient that [facility] received prescription for Praluent and prior authorization is required.  [facility] will be back in touch once insurance determination is received. Voicemail full.

## 2019-07-25 ENCOUNTER — PATIENT MESSAGE (OUTPATIENT)
Dept: PHARMACY | Facility: CLINIC | Age: 59
End: 2019-07-25

## 2019-08-01 NOTE — TELEPHONE ENCOUNTER
Praluent phone consultation, injection training, and shipment scheduled for 8/2/19 at 9:30am.  Patient has injection experience with insulin.  She does not have access to computer to view injection video.  $3.80.

## 2019-08-06 NOTE — TELEPHONE ENCOUNTER
Patient is at dialysis.  Praluent phone consultation, injection training, and shipment scheduled for 8/7/19 at 9am. Patient has injection experience with insulin. She does not have access to computer to view injection video. $3.80.

## 2019-08-07 NOTE — TELEPHONE ENCOUNTER
Initial Praluent 150mg PENS consult completed on 19 . Praluent will be shipped on 19 to arrive at patient's home on 19 via [employer]. $ 3.80 copay. Patient will start Praluent on 8/15/19. Address confirmed. Confirmed 2 patient identifiers - name and . Therapy Appropriate.  --Injection experience: Uses insulin.  Step by step injection training reviewed; instructions included in packaging.  Not able to view injection video online (does not use computer).  Pt feels comfortable with starting Praluent.  Plans to inject 1st and 15th of every month.    Counseled patient on administration directions:  - Inject 150mg (1 Pen) into the skin every 14 days.   - Take out of the refrigerator 30-60 minutes prior to injection.  - Wash hands before and after injection.  - Monthly RX will come with gauze, bandaids, and alcohol swabs.  - Patient may inject in either the tops of the thighs, abdomen- but at least 2 inches away from her belly button, or the outer part of her upper arm.  Patient was instructed to rotate injections sites.  - Patient is to wipe down the injection site with the alcohol pad, wait to dry.  Gently squeeze the area of the cleaned skin and hold it firmly.   Place the pen flat against the raised area of skin that is being squeezed, then push down on the button and release,  in 10-15 seconds you will hear a click and the window will go from from clear to yellow, indicating injection is complete.  - Patient should rotate injection sites.   - Patient will use sharps container; once full, per [place] law, she/ he may lock the sharps container and place in her trash. She/ he can then contact the Pharmacy and we will replace the sharps at no additional charge.    Patient was counseled on possible side effects:  - Injection site reaction: redness, soreness, itching, bruising, which should resolve within 3-5 days.  - flu-like symptoms    May increase BG - Pt tests BG BID.  Advised to keep a log and report any  increasing trends to [facility] or provider.    DDIs: Medication list and allergies reviewed.  No DDIs.    Storage: Keep refrigerated for stability until expiration on the box, but room temperature is ok if used within 30 days.     Diet and exercise tips declined. Pt stated she knows what to do.  Currently enrolled in smoking cessation program.    Patient did not have any further questions. She was advised to keep a calendar to stay compliant. Consultation included: indication; goals of treatment; administration; storage and handling; side effects; how to handle side effects; the importance of compliance; how to handle missed doses; the importance of laboratory monitoring; the importance of keeping all follow up appointments.  Patient understands to report any medication changes to [facility] and provider. All questions answered and addressed to patients satisfaction. I will f/u with her in 1 week from start, [facility] to contact patient in 3 weeks for refills.

## 2019-08-17 ENCOUNTER — HOSPITAL ENCOUNTER (EMERGENCY)
Facility: HOSPITAL | Age: 59
Discharge: HOME | End: 2019-08-17
Attending: SURGERY
Payer: MEDICARE

## 2019-08-17 VITALS
WEIGHT: 232 LBS | BODY MASS INDEX: 39.61 KG/M2 | HEART RATE: 65 BPM | SYSTOLIC BLOOD PRESSURE: 106 MMHG | RESPIRATION RATE: 16 BRPM | OXYGEN SATURATION: 97 % | HEIGHT: 64 IN | DIASTOLIC BLOOD PRESSURE: 56 MMHG | TEMPERATURE: 98 F

## 2019-08-17 DIAGNOSIS — R53.1 WEAKNESS: ICD-10-CM

## 2019-08-17 DIAGNOSIS — K52.9 GASTROENTERITIS: Primary | ICD-10-CM

## 2019-08-17 LAB
ALBUMIN SERPL BCP-MCNC: 3.8 G/DL (ref 3.5–5.2)
ALP SERPL-CCNC: 110 U/L (ref 55–135)
ALT SERPL W/O P-5'-P-CCNC: 22 U/L (ref 10–44)
ANION GAP SERPL CALC-SCNC: 11 MMOL/L (ref 8–16)
APTT BLDCRRT: 31.3 SEC (ref 21–32)
AST SERPL-CCNC: 20 U/L (ref 10–40)
BASOPHILS # BLD AUTO: 0.03 K/UL (ref 0–0.2)
BASOPHILS NFR BLD: 0.5 % (ref 0–1.9)
BILIRUB SERPL-MCNC: 0.5 MG/DL (ref 0.1–1)
BNP SERPL-MCNC: 320 PG/ML (ref 0–99)
BUN SERPL-MCNC: 17 MG/DL (ref 6–20)
CALCIUM SERPL-MCNC: 9.4 MG/DL (ref 8.7–10.5)
CHLORIDE SERPL-SCNC: 98 MMOL/L (ref 95–110)
CK MB SERPL-MCNC: 2.9 NG/ML (ref 0.1–6.5)
CK MB SERPL-MCNC: 3 NG/ML (ref 0.1–6.5)
CK MB SERPL-RTO: 2.6 % (ref 0–5)
CK MB SERPL-RTO: 2.6 % (ref 0–5)
CK SERPL-CCNC: 110 U/L (ref 20–180)
CK SERPL-CCNC: 110 U/L (ref 20–180)
CK SERPL-CCNC: 115 U/L (ref 20–180)
CK SERPL-CCNC: 115 U/L (ref 20–180)
CO2 SERPL-SCNC: 30 MMOL/L (ref 23–29)
CREAT SERPL-MCNC: 3.5 MG/DL (ref 0.5–1.4)
DIFFERENTIAL METHOD: ABNORMAL
EOSINOPHIL # BLD AUTO: 0.4 K/UL (ref 0–0.5)
EOSINOPHIL NFR BLD: 5.5 % (ref 0–8)
ERYTHROCYTE [DISTWIDTH] IN BLOOD BY AUTOMATED COUNT: 14.7 % (ref 11.5–14.5)
EST. GFR  (AFRICAN AMERICAN): 16 ML/MIN/1.73 M^2
EST. GFR  (NON AFRICAN AMERICAN): 14 ML/MIN/1.73 M^2
GLUCOSE SERPL-MCNC: 63 MG/DL (ref 70–110)
HCT VFR BLD AUTO: 38.4 % (ref 37–48.5)
HGB BLD-MCNC: 12.4 G/DL (ref 12–16)
IMM GRANULOCYTES # BLD AUTO: 0.03 K/UL (ref 0–0.04)
IMM GRANULOCYTES NFR BLD AUTO: 0.5 % (ref 0–0.5)
INR PPP: 1 (ref 0.8–1.2)
LYMPHOCYTES # BLD AUTO: 1.5 K/UL (ref 1–4.8)
LYMPHOCYTES NFR BLD: 22.2 % (ref 18–48)
MCH RBC QN AUTO: 28.8 PG (ref 27–31)
MCHC RBC AUTO-ENTMCNC: 32.3 G/DL (ref 32–36)
MCV RBC AUTO: 89 FL (ref 82–98)
MONOCYTES # BLD AUTO: 0.6 K/UL (ref 0.3–1)
MONOCYTES NFR BLD: 8.7 % (ref 4–15)
NEUTROPHILS # BLD AUTO: 4.1 K/UL (ref 1.8–7.7)
NEUTROPHILS NFR BLD: 62.6 % (ref 38–73)
NRBC BLD-RTO: 0 /100 WBC
PLATELET # BLD AUTO: 193 K/UL (ref 150–350)
PMV BLD AUTO: 10.7 FL (ref 9.2–12.9)
POCT GLUCOSE: 92 MG/DL (ref 70–110)
POTASSIUM SERPL-SCNC: 3.2 MMOL/L (ref 3.5–5.1)
PROT SERPL-MCNC: 8.3 G/DL (ref 6–8.4)
PROTHROMBIN TIME: 10.2 SEC (ref 9–12.5)
RBC # BLD AUTO: 4.3 M/UL (ref 4–5.4)
SODIUM SERPL-SCNC: 139 MMOL/L (ref 136–145)
TROPONIN I SERPL DL<=0.01 NG/ML-MCNC: 0.04 NG/ML (ref 0–0.03)
TROPONIN I SERPL DL<=0.01 NG/ML-MCNC: 0.04 NG/ML (ref 0–0.03)
WBC # BLD AUTO: 6.53 K/UL (ref 3.9–12.7)

## 2019-08-17 PROCEDURE — 82553 CREATINE MB FRACTION: CPT

## 2019-08-17 PROCEDURE — 85025 COMPLETE CBC W/AUTO DIFF WBC: CPT

## 2019-08-17 PROCEDURE — 85730 THROMBOPLASTIN TIME PARTIAL: CPT

## 2019-08-17 PROCEDURE — 93010 ELECTROCARDIOGRAM REPORT: CPT | Mod: ,,, | Performed by: INTERNAL MEDICINE

## 2019-08-17 PROCEDURE — 84484 ASSAY OF TROPONIN QUANT: CPT | Mod: 91

## 2019-08-17 PROCEDURE — A9698 NON-RAD CONTRAST MATERIALNOC: HCPCS | Performed by: SURGERY

## 2019-08-17 PROCEDURE — 85610 PROTHROMBIN TIME: CPT

## 2019-08-17 PROCEDURE — 93010 EKG 12-LEAD: ICD-10-PCS | Mod: ,,, | Performed by: INTERNAL MEDICINE

## 2019-08-17 PROCEDURE — 36415 COLL VENOUS BLD VENIPUNCTURE: CPT

## 2019-08-17 PROCEDURE — 93005 ELECTROCARDIOGRAM TRACING: CPT

## 2019-08-17 PROCEDURE — 25500020 PHARM REV CODE 255: Performed by: SURGERY

## 2019-08-17 PROCEDURE — 82550 ASSAY OF CK (CPK): CPT

## 2019-08-17 PROCEDURE — 82962 GLUCOSE BLOOD TEST: CPT

## 2019-08-17 PROCEDURE — 83880 ASSAY OF NATRIURETIC PEPTIDE: CPT

## 2019-08-17 PROCEDURE — 99285 EMERGENCY DEPT VISIT HI MDM: CPT | Mod: 25

## 2019-08-17 PROCEDURE — 25000003 PHARM REV CODE 250: Performed by: SURGERY

## 2019-08-17 PROCEDURE — 80053 COMPREHEN METABOLIC PANEL: CPT

## 2019-08-17 RX ORDER — DICYCLOMINE HYDROCHLORIDE 20 MG/1
20 TABLET ORAL 4 TIMES DAILY PRN
Qty: 15 TABLET | Refills: 0 | Status: SHIPPED | OUTPATIENT
Start: 2019-08-17 | End: 2019-09-16

## 2019-08-17 RX ORDER — ONDANSETRON 4 MG/1
4 TABLET, ORALLY DISINTEGRATING ORAL EVERY 8 HOURS PRN
Qty: 20 TABLET | Refills: 0 | Status: ON HOLD | OUTPATIENT
Start: 2019-08-17 | End: 2020-01-01 | Stop reason: HOSPADM

## 2019-08-17 RX ADMIN — LIDOCAINE HYDROCHLORIDE 50 ML: 20 SOLUTION ORAL; TOPICAL at 10:08

## 2019-08-17 RX ADMIN — BARIUM SULFATE 900 ML: 20 SUSPENSION ORAL at 08:08

## 2019-08-18 NOTE — ED PROVIDER NOTES
[facility]                                                 Chief Complaint  59 y.o. female with Abdominal Pain   -- Reports got overheated, got dizzy  -- now has pains in middle of abdomen while at baby shower    History of Present Illness  Caryn Mauricio presents to the emergency room with abdominal cramps  Patient went to dialysis today and felt that she got overheated, not orthostatic  Patient states that dialysis went for the typical time, went to a baby shower after  Pt had abdominal cramps today, ate eggs at the baby shower this afternoon  Patient began to have abdominal cramps after eating these eggs this afternoon  Pt has normal bowel sounds with no signs of peritonitis or rebound on evaluation  Pt has a normal neurologic exam, states that she thinks she has food poisoning    The history is provided by the patient   device was not used during this ER visit    Past Medical History   -- AI (aortic insufficiency)      -- Anemia      -- Anticoagulant long-term use      -- Asthma      -- CKD (chronic kidney disease) stage 4, GFR 15-29 ml/min      -- Congestion of upper airway      -- Coronary artery disease      -- Depression      -- Diabetes mellitus      -- Diabetes mellitus type 2 in obese      -- Dyspnea      -- E. coli UTI      -- Encounter for blood transfusion      -- Heart failure with preserved ejection fraction      -- Hyperlipemia      -- Hypertension      -- Hypothyroidism      -- Neuropathy      -- PAF (paroxysmal atrial fibrillation)      -- Shortness of breath      -- Type 2 diabetes mellitus with diabetic nephropathy          Past Surgical History   -- Tubal ligation         -- Hysterectomy         -- Tonsillectomy         -- Abscess drainage         -- Colonoscopy         -- Cardiac catheterization         -- Av fistula placement, brachiocephalic         -- Colonoscopy         -- Upper gastrointestinal endoscopy             Allergies   -- Iodine And  Iodide Containing Products      I have reviewed all of this patient's past medical, surgical, family, and social   histories as well as active allergies and medications documented in the  electronic medical record    Review of Systems and Physical Exam      Review of Systems  -- Constitution - no fever, denies fatigue, no weakness, no chills  -- Eyes - no tearing or redness, no visual disturbance  -- Ear, Nose - no tinnitus or earache, no nasal congestion or discharge  -- Mouth,Throat - no sore throat, no toothache, normal voice, normal swallowing  -- Respiratory - denies cough and congestion, no shortness of breath, no MOSQUEDA  -- Cardiovascular - denies chest pain, no palpitations, denies claudication  -- Gastrointestinal - abdominal cramps and nausea with no vomiting or diarrhea  -- Genitourinary - no dysuria, denies flank pain, no hematuria, no STD risk  -- Musculoskeletal - denies back pain, negative for trauma or injury  -- Neurological - dizziness with no seizure or loss of consciousness  -- Skin - denies pallor, rash, or changes in skin. no hives or welts noted  -- Psychiatric - Denies SI or HI, no psychosis or fractured thought noted     Vital Signs  Her temperature is 97.8 °F (36.6 °C).   Her blood pressure is 109/55 and her pulse is 67.   Her respiration is 19 and oxygen saturation is 96%.     Physical Exam  -- Nursing note and vitals reviewed  -- Constitutional: Appears well-developed and well-nourished  -- Head: Atraumatic. Normocephalic. No obvious abnormality  -- Eyes: Pupils are equal and reactive to light. Normal conjunctiva and lids  -- Cardiac: Normal rate, regular rhythm and normal heart sounds  -- Pulmonary: Normal respiratory effort, breath sounds clear to auscultation  -- Abdominal: Soft, no tenderness. Normal bowel sounds. Normal liver edge  -- Musculoskeletal: Normal range of motion, no effusions. Joints stable   -- Neurological: No focal deficits. Showed good interaction with staff  --  Vascular: Posterior tibial, dorsalis pedis and radial pulses 2+ bilaterally      Emergency Room Course      Lab Results     K 3.2 (L)   CL 98   CO2 30 (H)   BUN 17   CREATININE 3.5 (H)   GLU 63 (L)   ALKPHOS 110   AST 20   ALT 22   BILITOT 0.5   ALBUMIN 3.8   PROT 8.3   WBC 6.53   HGB 12.4   HCT 38.4            CPKMB 2.9   TROPONINI 0.040 (H)   INR 1.0    (H)   MG 2.0     EKG  -- The EKG findings today were without concerning findings from baseline   -- 1st and 2nd troponin was 0.04    Radiology  -- The CT of the head performed in the ER today was negative for acute pathology   -- CT scan the abdomen pelvis showed constipation    Medications Given  barium sulfate (READI-CAT) suspension 900 mL (900 mLs Oral Given 8/17/19 2000)   GI cocktail (mylanta 30 mL, lidocaine 2 % viscous 10 mL, dicyclomine 10 mL) 50 mL (50 mLs Oral Given 8/17/19 2240)     MDM  Number of Diagnoses or Management Options  Gastroenteritis: new, needed workup  Weakness: new, needed workup     Amount and/or Complexity of Data Reviewed  Clinical lab tests: ordered and reviewed  Tests in the radiology section of CPT®: ordered and reviewed  Tests in the medicine section of CPT®: reviewed and ordered    Risk of Complications, Morbidity, and/or Mortality  Presenting problems: moderate  Diagnostic procedures: moderate  Management options: moderate       ED Physician Management  -- Diagnosis management comments: 59 y.o. female with abdominal cramps  -- patient had abdominal cramps after eating eggs at bridal shower today  -- patient has soft abdominal exam and a negative CT in the ER today  -- she also states she felt dizzy after dialysis earlier, no air conditioning there  -- normal neuro exam with a negative head CT, negative metabolic workup  -- patient is sleeping prior to discharge, pain-free on discharge this evening  -- counseled to return to the ER with any concerning signs or symptoms after DC    Diagnosis  -- The  primary encounter diagnosis was Gastroenteritis.   -- A diagnosis of Weakness was also pertinent to this visit.    Disposition and Plan  -- Disposition: home  -- Condition: stable  -- Follow-up: Patient to follow up with Travis Newell MD in 1-2 days.  -- I advised the patient that we have found no life threatening condition today  -- At this time, I believe the patient is clinically stable for discharge.   -- The patient acknowledges that close follow up with a MD is required   -- Patient agrees to comply with all instruction and direction given in the ER    This note is dictated on M*Modal word recognition program.  There are word recognition mistakes that are occasionally missed on review.         Sunny Reed MD  08/17/19 0645

## 2019-08-22 NOTE — TELEPHONE ENCOUNTER
7 Day Post Start F/U for Praluent.  Patient is at the hospital right now.  She would like a call back.

## 2019-08-27 ENCOUNTER — TELEPHONE (OUTPATIENT)
Dept: PHARMACY | Facility: CLINIC | Age: 59
End: 2019-08-27

## 2019-08-27 NOTE — TELEPHONE ENCOUNTER
7 Day Post Start F/U for Praluent.  Spoke to patient.  Name and  confirmed.  Start date confirmed 8/15/19.  Patient self injected Praluent on her thigh without any difficulties.  She has one dose in the fridge, next injection due 19.  She feels good, reports experiencing no side effects since beginning of therapy.  She does not have any questions or concerns at this time.  [facility] will continue to reach out to patient monthly to arrange refills.  Pt verbalized understanding.

## 2019-09-05 ENCOUNTER — TELEPHONE (OUTPATIENT)
Dept: PHARMACY | Facility: CLINIC | Age: 59
End: 2019-09-05

## 2019-09-09 PROBLEM — Z09 HOSPITAL DISCHARGE FOLLOW-UP: Status: RESOLVED | Noted: 2019-06-10 | Resolved: 2019-09-09

## 2019-09-10 NOTE — TELEPHONE ENCOUNTER
Refill call regarding Praluent at [facility]. Will prepare for shipment on  to arrive  with pt consent. Copay 0.00.  Patient has not started any new medications, no missed doses/ side effects. Patient did not have any concerns or questions for the pharmacist at this time.  & address verified.  ~9/15 next injection with 0 doses on hand.

## 2019-10-04 ENCOUNTER — HOSPITAL ENCOUNTER (EMERGENCY)
Facility: HOSPITAL | Age: 59
Discharge: HOME | End: 2019-10-04
Attending: SURGERY
Payer: MEDICARE

## 2019-10-04 VITALS
BODY MASS INDEX: 39.07 KG/M2 | SYSTOLIC BLOOD PRESSURE: 109 MMHG | DIASTOLIC BLOOD PRESSURE: 51 MMHG | TEMPERATURE: 96 F | HEART RATE: 71 BPM | RESPIRATION RATE: 18 BRPM | OXYGEN SATURATION: 98 % | WEIGHT: 227.63 LBS

## 2019-10-04 DIAGNOSIS — J00 ACUTE NASOPHARYNGITIS: Primary | ICD-10-CM

## 2019-10-04 LAB
ALBUMIN SERPL BCP-MCNC: 3.4 G/DL (ref 3.5–5.2)
ALP SERPL-CCNC: 64 U/L (ref 55–135)
ALT SERPL W/O P-5'-P-CCNC: 15 U/L (ref 10–44)
ANION GAP SERPL CALC-SCNC: 8 MMOL/L (ref 8–16)
AST SERPL-CCNC: 14 U/L (ref 10–40)
BASOPHILS # BLD AUTO: 0.02 K/UL (ref 0–0.2)
BASOPHILS NFR BLD: 0.3 % (ref 0–1.9)
BILIRUB SERPL-MCNC: 0.4 MG/DL (ref 0.1–1)
BNP SERPL-MCNC: 159 PG/ML (ref 0–99)
BUN SERPL-MCNC: 19 MG/DL (ref 6–20)
CALCIUM SERPL-MCNC: 9.2 MG/DL (ref 8.7–10.5)
CHLORIDE SERPL-SCNC: 101 MMOL/L (ref 95–110)
CK MB SERPL-MCNC: 1.8 NG/ML (ref 0.1–6.5)
CK MB SERPL-RTO: 1.4 % (ref 0–5)
CK SERPL-CCNC: 130 U/L (ref 20–180)
CK SERPL-CCNC: 130 U/L (ref 20–180)
CO2 SERPL-SCNC: 32 MMOL/L (ref 23–29)
CREAT SERPL-MCNC: 3.7 MG/DL (ref 0.5–1.4)
DEPRECATED S PYO AG THROAT QL EIA: NEGATIVE
DIFFERENTIAL METHOD: ABNORMAL
EOSINOPHIL # BLD AUTO: 0.2 K/UL (ref 0–0.5)
EOSINOPHIL NFR BLD: 2.7 % (ref 0–8)
ERYTHROCYTE [DISTWIDTH] IN BLOOD BY AUTOMATED COUNT: 14 % (ref 11.5–14.5)
EST. GFR  (AFRICAN AMERICAN): 15 ML/MIN/1.73 M^2
EST. GFR  (NON AFRICAN AMERICAN): 13 ML/MIN/1.73 M^2
GLUCOSE SERPL-MCNC: 87 MG/DL (ref 70–110)
HCT VFR BLD AUTO: 36.6 % (ref 37–48.5)
HGB BLD-MCNC: 11.4 G/DL (ref 12–16)
IMM GRANULOCYTES # BLD AUTO: 0.03 K/UL (ref 0–0.04)
IMM GRANULOCYTES NFR BLD AUTO: 0.4 % (ref 0–0.5)
INFLUENZA A, MOLECULAR: NEGATIVE
INFLUENZA B, MOLECULAR: NEGATIVE
LYMPHOCYTES # BLD AUTO: 1.6 K/UL (ref 1–4.8)
LYMPHOCYTES NFR BLD: 22.4 % (ref 18–48)
MCH RBC QN AUTO: 28.6 PG (ref 27–31)
MCHC RBC AUTO-ENTMCNC: 31.1 G/DL (ref 32–36)
MCV RBC AUTO: 92 FL (ref 82–98)
MONOCYTES # BLD AUTO: 0.9 K/UL (ref 0.3–1)
MONOCYTES NFR BLD: 12.3 % (ref 4–15)
NEUTROPHILS # BLD AUTO: 4.4 K/UL (ref 1.8–7.7)
NEUTROPHILS NFR BLD: 61.9 % (ref 38–73)
NRBC BLD-RTO: 0 /100 WBC
PLATELET # BLD AUTO: 179 K/UL (ref 150–350)
PMV BLD AUTO: 9.8 FL (ref 9.2–12.9)
POTASSIUM SERPL-SCNC: 4 MMOL/L (ref 3.5–5.1)
PROT SERPL-MCNC: 7.3 G/DL (ref 6–8.4)
RBC # BLD AUTO: 3.98 M/UL (ref 4–5.4)
SODIUM SERPL-SCNC: 141 MMOL/L (ref 136–145)
SPECIMEN SOURCE: NORMAL
TROPONIN I SERPL DL<=0.01 NG/ML-MCNC: 0.05 NG/ML (ref 0–0.03)
WBC # BLD AUTO: 7.15 K/UL (ref 3.9–12.7)

## 2019-10-04 PROCEDURE — 82550 ASSAY OF CK (CPK): CPT

## 2019-10-04 PROCEDURE — 36415 COLL VENOUS BLD VENIPUNCTURE: CPT

## 2019-10-04 PROCEDURE — 99284 EMERGENCY DEPT VISIT MOD MDM: CPT | Mod: 25

## 2019-10-04 PROCEDURE — 63600175 PHARM REV CODE 636 W HCPCS: Performed by: SURGERY

## 2019-10-04 PROCEDURE — 87880 STREP A ASSAY W/OPTIC: CPT

## 2019-10-04 PROCEDURE — 84484 ASSAY OF TROPONIN QUANT: CPT

## 2019-10-04 PROCEDURE — 83880 ASSAY OF NATRIURETIC PEPTIDE: CPT

## 2019-10-04 PROCEDURE — 85025 COMPLETE CBC W/AUTO DIFF WBC: CPT

## 2019-10-04 PROCEDURE — 96372 THER/PROPH/DIAG INJ SC/IM: CPT

## 2019-10-04 PROCEDURE — 82553 CREATINE MB FRACTION: CPT

## 2019-10-04 PROCEDURE — 87502 INFLUENZA DNA AMP PROBE: CPT

## 2019-10-04 PROCEDURE — 87081 CULTURE SCREEN ONLY: CPT

## 2019-10-04 PROCEDURE — 80053 COMPREHEN METABOLIC PANEL: CPT

## 2019-10-04 RX ORDER — BENZONATATE 100 MG/1
200 CAPSULE ORAL 3 TIMES DAILY PRN
Qty: 20 CAPSULE | Refills: 0 | Status: SHIPPED | OUTPATIENT
Start: 2019-10-04 | End: 2019-10-14

## 2019-10-04 RX ORDER — CEFTRIAXONE 1 G/1
1 INJECTION, POWDER, FOR SOLUTION INTRAMUSCULAR; INTRAVENOUS
Status: COMPLETED | OUTPATIENT
Start: 2019-10-04 | End: 2019-10-04

## 2019-10-04 RX ORDER — AZITHROMYCIN 250 MG/1
TABLET, FILM COATED ORAL
Qty: 6 TABLET | Refills: 0 | Status: SHIPPED | OUTPATIENT
Start: 2019-10-04 | End: 2019-12-02 | Stop reason: CLARIF

## 2019-10-04 RX ADMIN — CEFTRIAXONE SODIUM 1 G: 1 INJECTION, POWDER, FOR SOLUTION INTRAMUSCULAR; INTRAVENOUS at 04:10

## 2019-10-04 NOTE — ED PROVIDER NOTES
[facility] Emergency Room                                                 Chief Complaint  59 y.o. female with URI    History of Present Illness  Caryn Mauricio presents to the emergency room with nasal congestion  Patient with nasal congestion and cold-like symptoms this evening at home  Patient on exam has clear nasal drainage with clear lung sounds in all fields  Patient went to hemodialysis today without problem or difficulty per history  Patient states she has had on unrelenting cough tonight, no wheezing noted  Patient's review of systems negative for fever or shortness of breath today    The history is provided by the patient   device was not used during this ER visit     Past Medical History   -- AI (aortic insufficiency)      -- Anemia      -- Anticoagulant long-term use      -- Asthma      -- CKD (chronic kidney disease) stage 4, GFR 15-29 ml/min      -- Congestion of upper airway      -- Coronary artery disease      -- Depression      -- Diabetes mellitus      -- Diabetes mellitus type 2 in obese      -- Dyspnea      -- E. coli UTI      -- Encounter for blood transfusion      -- Heart failure with preserved ejection fraction      -- Hyperlipemia      -- Hypertension      -- Hypothyroidism      -- Neuropathy      -- PAF (paroxysmal atrial fibrillation)      -- Shortness of breath      -- Type 2 diabetes mellitus with diabetic nephropathy          Past Surgical History   -- Tubal ligation         -- Hysterectomy         -- Tonsillectomy         -- Abscess drainage         -- Colonoscopy         -- Cardiac catheterization         -- Av fistula placement, brachiocephalic         -- Colonoscopy         -- Upper gastrointestinal endoscopy             Allergies   -- Iodine And Iodide Containing Products      I have reviewed all of this patient's past medical, surgical, family, and social   histories as well as active allergies and medications documented in the  electronic medical  record    Review of Systems and Physical Exam      Review of Systems  -- Constitution - no fever, denies fatigue, no weakness, no chills  -- Eyes - no tearing or redness, no visual disturbance  -- Ear, Nose - sneezing, nasal congestion and clear discharge   -- Mouth,Throat - no sore throat, no toothache, normal voice, normal swallowing  -- Respiratory - cough and congestion, no shortness of breath, no MOSQUEDA  -- Cardiovascular - denies chest pain, no palpitations, denies claudication  -- Gastrointestinal - denies abdominal pain, nausea, vomiting, or diarrhea  -- Genitourinary - no dysuria, denies flank pain, no hematuria, no STD risk  -- Musculoskeletal - denies back pain, negative for trauma or injury  -- Neurological - no headache, denies weakness or seizure; no LOC  -- Skin - denies pallor, rash, or changes in skin. no hives or welts noted  -- Psychiatric - Denies SI or HI, no psychosis or fractured thought noted     Vital Signs  Her tympanic temperature is 96 °F (35.6 °C).   Her blood pressure is 109/51 and her pulse is 71.   Her respiration is 18 and oxygen saturation is 98%.     Physical Exam  -- Nursing note and vitals reviewed  -- Constitutional: Appears well-developed and well-nourished  -- Head: Atraumatic. Normocephalic. No obvious abnormality  -- Eyes: Pupils are equal and reactive to light. Normal conjunctiva and lids  -- Nose: nasal mucosa erythema and edema; clear nasal discharge noted   -- Throat: Mucous membranes moist, pharynx normal, normal tonsils. No lesions   -- Ears: External ears and TM normal bilaterally. Normal hearing and no drainage  -- Neck: Normal range of motion. Neck supple. No masses, trachea midline  -- Cardiac: Normal rate, regular rhythm and normal heart sounds  -- Pulmonary: Normal respiratory effort, breath sounds clear to auscultation  -- Abdominal: Soft, no tenderness. Normal bowel sounds. Normal liver edge  -- Musculoskeletal: Normal range of motion, no effusions. Joints stable      Emergency Room Course      Lab Results     K 4.0      CO2 32 (H)   BUN 19   CREATININE 3.7 (H)   GLU 87   ALKPHOS 64   AST 14   ALT 15   BILITOT 0.4   ALBUMIN 3.4 (L)   PROT 7.3   WBC 7.15   HGB 11.4 (L)   HCT 36.6 (L)            CPKMB 1.8   TROPONINI 0.050 (H)    (H)     EKG  -- The EKG findings today were without concerning findings from baseline     Radiology  -- Chest x-ray showed no infiltrate and showed no acute pathology     Additional Work up  -- the patient tested negative for influenza A in the emergency room today  -- The strep screen was negative    Medications Given  cefTRIAXone injection 1 g (1 g Intramuscular Given 10/4/19 5334)     ED Physician Management  -- Diagnosis management comments: 59 y.o. female with nasal congestion  -- patient with upper respiratory symptoms this evening per ER interview  -- patient with a clear chest x-ray and normal white count, 98% oxygen  -- due to the patient's dialysis/renal failure status, metabolic workup performed  -- all lab work appears stable with no acute finding or changes in the ER tonight  -- antibiotic injection given the ER with prescription, follow up with her PCP  -- return to the ER with any concerning signs or symptoms after discharge    Diagnosis  -- The encounter diagnosis was Acute nasopharyngitis.    Disposition and Plan  -- Disposition: home  -- Condition: stable  -- Follow-up: Patient to follow up with Travis Newell MD in 1-2 days.  -- I advised the patient that we have found no life threatening condition today  -- At this time, I believe the patient is clinically stable for discharge.   -- The patient acknowledges that close follow up with a MD is required   -- Patient agrees to comply with all instruction and direction given in the ER    This note is dictated on M*Modal word recognition program.  There are word recognition mistakes that are occasionally missed on review.         Sunny Reed,  MD  10/04/19 0842

## 2019-10-06 LAB — BACTERIA THROAT CULT: NORMAL

## 2019-10-09 ENCOUNTER — TELEPHONE (OUTPATIENT)
Dept: PHARMACY | Facility: CLINIC | Age: 59
End: 2019-10-09

## 2019-10-11 PROBLEM — E11.3593 PROLIFERATIVE DIABETIC RETINOPATHY OF BOTH EYES WITHOUT MACULAR EDEMA ASSOCIATED WITH TYPE 2 DIABETES MELLITUS: Status: ACTIVE | Noted: 2019-10-11

## 2019-11-12 ENCOUNTER — TELEPHONE (OUTPATIENT)
Dept: PHARMACY | Facility: CLINIC | Age: 59
End: 2019-11-12

## 2019-12-17 ENCOUNTER — TELEPHONE (OUTPATIENT)
Dept: PHARMACY | Facility: CLINIC | Age: 59
End: 2019-12-17

## 2019-12-20 NOTE — TELEPHONE ENCOUNTER
RX call attempt 2 regarding Praluent from [facility]. Patient was not reached -- no voicemail was left (home number went straight to  but mailbox was full,, second mobile number was daughters number and we do not have a note that says we may speak with her). Informed daughter that we were trying to get in touch with patient.  copay 0.00

## 2020-01-01 ENCOUNTER — HOSPITAL ENCOUNTER (OUTPATIENT)
Dept: CARDIOLOGY | Facility: OTHER | Age: 60
Discharge: HOME | End: 2020-08-06
Attending: INTERNAL MEDICINE | Admitting: INTERNAL MEDICINE
Payer: MEDICARE

## 2020-01-01 ENCOUNTER — LAB VISIT (OUTPATIENT)
Dept: LAB | Facility: HOSPITAL | Age: 60
End: 2020-01-01
Attending: NURSE PRACTITIONER
Payer: MEDICARE

## 2020-01-01 ENCOUNTER — HOSPITAL ENCOUNTER (EMERGENCY)
Facility: HOSPITAL | Age: 60
Discharge: HOME | End: 2020-10-07
Attending: SURGERY
Payer: MEDICARE

## 2020-01-01 ENCOUNTER — HOSPITAL ENCOUNTER (OUTPATIENT)
Facility: OTHER | Age: 60
Discharge: HOME | End: 2020-08-07
Attending: INTERNAL MEDICINE | Admitting: INTERNAL MEDICINE
Payer: MEDICARE

## 2020-01-01 ENCOUNTER — HOSPITAL ENCOUNTER (EMERGENCY)
Facility: HOSPITAL | Age: 60
Discharge: ANOTHER HOSPITAL | End: 2020-08-06
Attending: SURGERY
Payer: MEDICARE

## 2020-01-01 VITALS
BODY MASS INDEX: 36.32 KG/M2 | OXYGEN SATURATION: 92 % | HEART RATE: 70 BPM | WEIGHT: 226 LBS | HEIGHT: 66 IN | DIASTOLIC BLOOD PRESSURE: 79 MMHG | SYSTOLIC BLOOD PRESSURE: 174 MMHG | TEMPERATURE: 99 F | RESPIRATION RATE: 18 BRPM

## 2020-01-01 VITALS
TEMPERATURE: 99 F | HEART RATE: 68 BPM | DIASTOLIC BLOOD PRESSURE: 68 MMHG | WEIGHT: 222 LBS | SYSTOLIC BLOOD PRESSURE: 145 MMHG | OXYGEN SATURATION: 97 % | BODY MASS INDEX: 35.83 KG/M2 | RESPIRATION RATE: 25 BRPM

## 2020-01-01 VITALS
HEART RATE: 79 BPM | HEIGHT: 64 IN | BODY MASS INDEX: 38.79 KG/M2 | OXYGEN SATURATION: 94 % | SYSTOLIC BLOOD PRESSURE: 161 MMHG | RESPIRATION RATE: 20 BRPM | WEIGHT: 227.19 LBS | DIASTOLIC BLOOD PRESSURE: 70 MMHG | TEMPERATURE: 99 F

## 2020-01-01 VITALS
BODY MASS INDEX: 36.16 KG/M2 | SYSTOLIC BLOOD PRESSURE: 156 MMHG | WEIGHT: 225 LBS | HEIGHT: 66 IN | HEART RATE: 57 BPM | DIASTOLIC BLOOD PRESSURE: 49 MMHG

## 2020-01-01 DIAGNOSIS — Z99.2 TYPE 2 DIABETES MELLITUS WITH CHRONIC KIDNEY DISEASE ON CHRONIC DIALYSIS, WITH LONG-TERM CURRENT USE OF INSULIN: ICD-10-CM

## 2020-01-01 DIAGNOSIS — I25.83 CORONARY ARTERY DISEASE DUE TO LIPID RICH PLAQUE: ICD-10-CM

## 2020-01-01 DIAGNOSIS — I25.10 CORONARY ARTERY DISEASE DUE TO LIPID RICH PLAQUE: ICD-10-CM

## 2020-01-01 DIAGNOSIS — R05.9 COUGH: ICD-10-CM

## 2020-01-01 DIAGNOSIS — E11.22 TYPE 2 DIABETES MELLITUS WITH CHRONIC KIDNEY DISEASE ON CHRONIC DIALYSIS, WITH LONG-TERM CURRENT USE OF INSULIN: ICD-10-CM

## 2020-01-01 DIAGNOSIS — R04.2 COUGHING BLOOD: ICD-10-CM

## 2020-01-01 DIAGNOSIS — I20.0 UNSTABLE ANGINA: Primary | ICD-10-CM

## 2020-01-01 DIAGNOSIS — R07.9 CHEST PAIN: ICD-10-CM

## 2020-01-01 DIAGNOSIS — I10 ESSENTIAL HYPERTENSION: ICD-10-CM

## 2020-01-01 DIAGNOSIS — N18.6 TYPE 2 DIABETES MELLITUS WITH CHRONIC KIDNEY DISEASE ON CHRONIC DIALYSIS, WITH LONG-TERM CURRENT USE OF INSULIN: ICD-10-CM

## 2020-01-01 DIAGNOSIS — R07.9 CHEST PAIN: Primary | ICD-10-CM

## 2020-01-01 DIAGNOSIS — Z79.4 TYPE 2 DIABETES MELLITUS WITH CHRONIC KIDNEY DISEASE ON CHRONIC DIALYSIS, WITH LONG-TERM CURRENT USE OF INSULIN: ICD-10-CM

## 2020-01-01 DIAGNOSIS — E03.4 HYPOTHYROIDISM DUE TO ACQUIRED ATROPHY OF THYROID: ICD-10-CM

## 2020-01-01 DIAGNOSIS — J06.9 UPPER RESPIRATORY TRACT INFECTION, UNSPECIFIED TYPE: Primary | ICD-10-CM

## 2020-01-01 LAB
ALBUMIN SERPL BCP-MCNC: 3.7 G/DL (ref 3.5–5.2)
ALBUMIN SERPL BCP-MCNC: 3.8 G/DL (ref 3.5–5.2)
ALBUMIN SERPL BCP-MCNC: 4.2 G/DL (ref 3.5–5.2)
ALP SERPL-CCNC: 66 U/L (ref 55–135)
ALP SERPL-CCNC: 89 U/L (ref 55–135)
ALP SERPL-CCNC: 99 U/L (ref 55–135)
ALT SERPL W/O P-5'-P-CCNC: 21 U/L (ref 10–44)
ALT SERPL W/O P-5'-P-CCNC: 26 U/L (ref 10–44)
ALT SERPL W/O P-5'-P-CCNC: 34 U/L (ref 10–44)
ANION GAP SERPL CALC-SCNC: 11 MMOL/L (ref 8–16)
ANION GAP SERPL CALC-SCNC: 11 MMOL/L (ref 8–16)
ANION GAP SERPL CALC-SCNC: 12 MMOL/L (ref 8–16)
ANION GAP SERPL CALC-SCNC: 12 MMOL/L (ref 8–16)
ANION GAP SERPL CALC-SCNC: 13 MMOL/L (ref 8–16)
APTT BLDCRRT: 32.2 SEC (ref 21–32)
APTT BLDCRRT: 32.2 SEC (ref 21–32)
AST SERPL-CCNC: 16 U/L (ref 10–40)
AST SERPL-CCNC: 20 U/L (ref 10–40)
AST SERPL-CCNC: 26 U/L (ref 10–40)
BASOPHILS # BLD AUTO: 0.02 K/UL (ref 0–0.2)
BASOPHILS # BLD AUTO: 0.03 K/UL (ref 0–0.2)
BASOPHILS NFR BLD: 0.3 % (ref 0–1.9)
BASOPHILS NFR BLD: 0.4 % (ref 0–1.9)
BASOPHILS NFR BLD: 0.5 % (ref 0–1.9)
BASOPHILS NFR BLD: 0.5 % (ref 0–1.9)
BILIRUB SERPL-MCNC: 0.5 MG/DL (ref 0.1–1)
BILIRUB SERPL-MCNC: 0.7 MG/DL (ref 0.1–1)
BILIRUB SERPL-MCNC: 0.7 MG/DL (ref 0.1–1)
BNP SERPL-MCNC: 460 PG/ML (ref 0–99)
BSA FOR ECHO PROCEDURE: 2.18 M2
BUN SERPL-MCNC: 34 MG/DL (ref 6–20)
BUN SERPL-MCNC: 39 MG/DL (ref 6–20)
BUN SERPL-MCNC: 40 MG/DL (ref 6–20)
BUN SERPL-MCNC: 43 MG/DL (ref 6–20)
BUN SERPL-MCNC: 46 MG/DL (ref 6–20)
CALCIUM SERPL-MCNC: 10.2 MG/DL (ref 8.7–10.5)
CALCIUM SERPL-MCNC: 8.8 MG/DL (ref 8.7–10.5)
CALCIUM SERPL-MCNC: 9.4 MG/DL (ref 8.7–10.5)
CALCIUM SERPL-MCNC: 9.8 MG/DL (ref 8.7–10.5)
CALCIUM SERPL-MCNC: 9.8 MG/DL (ref 8.7–10.5)
CHLORIDE SERPL-SCNC: 100 MMOL/L (ref 95–110)
CHLORIDE SERPL-SCNC: 102 MMOL/L (ref 95–110)
CHLORIDE SERPL-SCNC: 104 MMOL/L (ref 95–110)
CHLORIDE SERPL-SCNC: 97 MMOL/L (ref 95–110)
CHLORIDE SERPL-SCNC: 97 MMOL/L (ref 95–110)
CHOLEST SERPL-MCNC: 197 MG/DL (ref 120–199)
CHOLEST/HDLC SERPL: 3.8 {RATIO} (ref 2–5)
CK MB SERPL-MCNC: 3 NG/ML (ref 0.1–6.5)
CK MB SERPL-MCNC: 4.1 NG/ML (ref 0.1–6.5)
CK MB SERPL-RTO: 2.5 % (ref 0–5)
CK MB SERPL-RTO: 3.7 % (ref 0–5)
CK SERPL-CCNC: 111 U/L (ref 20–180)
CK SERPL-CCNC: 111 U/L (ref 20–180)
CK SERPL-CCNC: 120 U/L (ref 20–180)
CK SERPL-CCNC: 120 U/L (ref 20–180)
CO2 SERPL-SCNC: 25 MMOL/L (ref 23–29)
CO2 SERPL-SCNC: 26 MMOL/L (ref 23–29)
CO2 SERPL-SCNC: 26 MMOL/L (ref 23–29)
CO2 SERPL-SCNC: 29 MMOL/L (ref 23–29)
CO2 SERPL-SCNC: 30 MMOL/L (ref 23–29)
CREAT SERPL-MCNC: 4.8 MG/DL (ref 0.5–1.4)
CREAT SERPL-MCNC: 5.2 MG/DL (ref 0.5–1.4)
CREAT SERPL-MCNC: 5.5 MG/DL (ref 0.5–1.4)
CREAT SERPL-MCNC: 5.7 MG/DL (ref 0.5–1.4)
CREAT SERPL-MCNC: 6 MG/DL (ref 0.5–1.4)
CV ECHO LV RWT: 0.36 CM
CV STRESS BASE HR: 57 BPM
D DIMER PPP IA.FEU-MCNC: 0.34 MG/L FEU
D DIMER PPP IA.FEU-MCNC: 0.45 MG/L FEU
DIASTOLIC BLOOD PRESSURE: 49 MMHG
DIFFERENTIAL METHOD: ABNORMAL
ECHO LV POSTERIOR WALL: 0.9 CM (ref 0.6–1.1)
EOSINOPHIL # BLD AUTO: 0.1 K/UL (ref 0–0.5)
EOSINOPHIL # BLD AUTO: 0.2 K/UL (ref 0–0.5)
EOSINOPHIL # BLD AUTO: 0.2 K/UL (ref 0–0.5)
EOSINOPHIL # BLD AUTO: 0.6 K/UL (ref 0–0.5)
EOSINOPHIL NFR BLD: 1.7 % (ref 0–8)
EOSINOPHIL NFR BLD: 2.5 % (ref 0–8)
EOSINOPHIL NFR BLD: 2.9 % (ref 0–8)
EOSINOPHIL NFR BLD: 8.9 % (ref 0–8)
ERYTHROCYTE [DISTWIDTH] IN BLOOD BY AUTOMATED COUNT: 14.5 % (ref 11.5–14.5)
ERYTHROCYTE [DISTWIDTH] IN BLOOD BY AUTOMATED COUNT: 14.9 % (ref 11.5–14.5)
ERYTHROCYTE [DISTWIDTH] IN BLOOD BY AUTOMATED COUNT: 15.2 % (ref 11.5–14.5)
ERYTHROCYTE [DISTWIDTH] IN BLOOD BY AUTOMATED COUNT: 15.2 % (ref 11.5–14.5)
EST. GFR  (AFRICAN AMERICAN): 10 ML/MIN/1.73 M^2
EST. GFR  (AFRICAN AMERICAN): 11 ML/MIN/1.73 M^2
EST. GFR  (AFRICAN AMERICAN): 8 ML/MIN/1.73 M^2
EST. GFR  (AFRICAN AMERICAN): 9 ML/MIN/1.73 M^2
EST. GFR  (AFRICAN AMERICAN): 9 ML/MIN/1.73 M^2
EST. GFR  (NON AFRICAN AMERICAN): 7 ML/MIN/1.73 M^2
EST. GFR  (NON AFRICAN AMERICAN): 8 ML/MIN/1.73 M^2
EST. GFR  (NON AFRICAN AMERICAN): 9 ML/MIN/1.73 M^2
ESTIMATED AVG GLUCOSE: 143 MG/DL (ref 68–131)
ESTIMATED AVG GLUCOSE: 169 MG/DL (ref 68–131)
FRACTIONAL SHORTENING: 34 % (ref 28–44)
GLUCOSE SERPL-MCNC: 135 MG/DL (ref 70–110)
GLUCOSE SERPL-MCNC: 155 MG/DL (ref 70–110)
GLUCOSE SERPL-MCNC: 174 MG/DL (ref 70–110)
GLUCOSE SERPL-MCNC: 181 MG/DL (ref 70–110)
GLUCOSE SERPL-MCNC: 182 MG/DL (ref 70–110)
HBA1C MFR BLD HPLC: 6.6 % (ref 4–5.6)
HBA1C MFR BLD HPLC: 7.5 % (ref 4–5.6)
HCT VFR BLD AUTO: 34.2 % (ref 37–48.5)
HCT VFR BLD AUTO: 34.4 % (ref 37–48.5)
HCT VFR BLD AUTO: 34.9 % (ref 37–48.5)
HCT VFR BLD AUTO: 36.7 % (ref 37–48.5)
HDLC SERPL-MCNC: 52 MG/DL (ref 40–75)
HDLC SERPL: 26.4 % (ref 20–50)
HGB BLD-MCNC: 10.7 G/DL (ref 12–16)
HGB BLD-MCNC: 10.9 G/DL (ref 12–16)
HGB BLD-MCNC: 11.2 G/DL (ref 12–16)
HGB BLD-MCNC: 11.9 G/DL (ref 12–16)
IMM GRANULOCYTES # BLD AUTO: 0.01 K/UL (ref 0–0.04)
IMM GRANULOCYTES # BLD AUTO: 0.01 K/UL (ref 0–0.04)
IMM GRANULOCYTES # BLD AUTO: 0.02 K/UL (ref 0–0.04)
IMM GRANULOCYTES # BLD AUTO: 0.02 K/UL (ref 0–0.04)
IMM GRANULOCYTES NFR BLD AUTO: 0.1 % (ref 0–0.5)
IMM GRANULOCYTES NFR BLD AUTO: 0.2 % (ref 0–0.5)
IMM GRANULOCYTES NFR BLD AUTO: 0.3 % (ref 0–0.5)
IMM GRANULOCYTES NFR BLD AUTO: 0.3 % (ref 0–0.5)
INR PPP: 1 (ref 0.8–1.2)
INR PPP: 1 (ref 0.8–1.2)
INTERVENTRICULAR SEPTUM: 0.9 CM (ref 0.6–1.1)
LDLC SERPL CALC-MCNC: 130.8 MG/DL (ref 63–159)
LEFT INTERNAL DIMENSION IN SYSTOLE: 3.3 CM (ref 2.1–4)
LEFT VENTRICLE MASS INDEX: 75 G/M2
LEFT VENTRICULAR INTERNAL DIMENSION IN DIASTOLE: 5 CM (ref 3.5–6)
LEFT VENTRICULAR MASS: 158.21 G
LYMPHOCYTES # BLD AUTO: 1.2 K/UL (ref 1–4.8)
LYMPHOCYTES # BLD AUTO: 1.5 K/UL (ref 1–4.8)
LYMPHOCYTES # BLD AUTO: 1.8 K/UL (ref 1–4.8)
LYMPHOCYTES # BLD AUTO: 2.1 K/UL (ref 1–4.8)
LYMPHOCYTES NFR BLD: 19.8 % (ref 18–48)
LYMPHOCYTES NFR BLD: 21.2 % (ref 18–48)
LYMPHOCYTES NFR BLD: 28.1 % (ref 18–48)
LYMPHOCYTES NFR BLD: 29.8 % (ref 18–48)
MAGNESIUM SERPL-MCNC: 1.9 MG/DL (ref 1.6–2.6)
MAGNESIUM SERPL-MCNC: 2.1 MG/DL (ref 1.6–2.6)
MCH RBC QN AUTO: 28.9 PG (ref 27–31)
MCH RBC QN AUTO: 28.9 PG (ref 27–31)
MCH RBC QN AUTO: 29.3 PG (ref 27–31)
MCH RBC QN AUTO: 29.5 PG (ref 27–31)
MCHC RBC AUTO-ENTMCNC: 31.3 G/DL (ref 32–36)
MCHC RBC AUTO-ENTMCNC: 31.7 G/DL (ref 32–36)
MCHC RBC AUTO-ENTMCNC: 32.1 G/DL (ref 32–36)
MCHC RBC AUTO-ENTMCNC: 32.4 G/DL (ref 32–36)
MCV RBC AUTO: 91 FL (ref 82–98)
MCV RBC AUTO: 92 FL (ref 82–98)
MONOCYTES # BLD AUTO: 0.6 K/UL (ref 0.3–1)
MONOCYTES # BLD AUTO: 0.8 K/UL (ref 0.3–1)
MONOCYTES NFR BLD: 12.2 % (ref 4–15)
MONOCYTES NFR BLD: 8.4 % (ref 4–15)
MONOCYTES NFR BLD: 9.1 % (ref 4–15)
MONOCYTES NFR BLD: 9.1 % (ref 4–15)
NEUTROPHILS # BLD AUTO: 3.7 K/UL (ref 1.8–7.7)
NEUTROPHILS # BLD AUTO: 3.8 K/UL (ref 1.8–7.7)
NEUTROPHILS # BLD AUTO: 4.1 K/UL (ref 1.8–7.7)
NEUTROPHILS # BLD AUTO: 4.7 K/UL (ref 1.8–7.7)
NEUTROPHILS NFR BLD: 56.5 % (ref 38–73)
NEUTROPHILS NFR BLD: 58.7 % (ref 38–73)
NEUTROPHILS NFR BLD: 61.4 % (ref 38–73)
NEUTROPHILS NFR BLD: 67.1 % (ref 38–73)
NONHDLC SERPL-MCNC: 145 MG/DL
NRBC BLD-RTO: 0 /100 WBC
OHS CV CPX 1 MINUTE RECOVERY HEART RATE: 64 BPM
OHS CV CPX 85 PERCENT MAX PREDICTED HEART RATE MALE: 131
OHS CV CPX ESTIMATED METS: 5
OHS CV CPX MAX PREDICTED HEART RATE: 154
OHS CV CPX PATIENT IS FEMALE: 1
OHS CV CPX PATIENT IS MALE: 0
OHS CV CPX PEAK DIASTOLIC BLOOD PRESSURE: 58 MMHG
OHS CV CPX PEAK HEAR RATE: 64 BPM
OHS CV CPX PEAK RATE PRESSURE PRODUCT: 8320
OHS CV CPX PEAK SYSTOLIC BLOOD PRESSURE: 130 MMHG
OHS CV CPX PERCENT MAX PREDICTED HEART RATE ACHIEVED: 42
OHS CV CPX RATE PRESSURE PRODUCT PRESENTING: 8892
PHOSPHATE SERPL-MCNC: 4.2 MG/DL (ref 2.7–4.5)
PHOSPHATE SERPL-MCNC: 5.4 MG/DL (ref 2.7–4.5)
PLATELET # BLD AUTO: 164 K/UL (ref 150–350)
PLATELET # BLD AUTO: 181 K/UL (ref 150–350)
PLATELET # BLD AUTO: 181 K/UL (ref 150–350)
PLATELET # BLD AUTO: 188 K/UL (ref 150–350)
PMV BLD AUTO: 10.5 FL (ref 9.2–12.9)
PMV BLD AUTO: 10.6 FL (ref 9.2–12.9)
PMV BLD AUTO: 10.8 FL (ref 9.2–12.9)
PMV BLD AUTO: 11.1 FL (ref 9.2–12.9)
POCT GLUCOSE: 130 MG/DL (ref 70–110)
POCT GLUCOSE: 182 MG/DL (ref 70–110)
POCT GLUCOSE: 183 MG/DL (ref 70–110)
POCT GLUCOSE: 189 MG/DL (ref 70–110)
POCT GLUCOSE: 274 MG/DL (ref 70–110)
POTASSIUM SERPL-SCNC: 3.7 MMOL/L (ref 3.5–5.1)
POTASSIUM SERPL-SCNC: 4.1 MMOL/L (ref 3.5–5.1)
POTASSIUM SERPL-SCNC: 4.1 MMOL/L (ref 3.5–5.1)
POTASSIUM SERPL-SCNC: 4.3 MMOL/L (ref 3.5–5.1)
POTASSIUM SERPL-SCNC: 4.5 MMOL/L (ref 3.5–5.1)
PROT SERPL-MCNC: 7.6 G/DL (ref 6–8.4)
PROT SERPL-MCNC: 7.7 G/DL (ref 6–8.4)
PROT SERPL-MCNC: 8.1 G/DL (ref 6–8.4)
PROTHROMBIN TIME: 10.7 SEC (ref 9–12.5)
PROTHROMBIN TIME: 10.7 SEC (ref 9–12.5)
RBC # BLD AUTO: 3.7 M/UL (ref 4–5.4)
RBC # BLD AUTO: 3.77 M/UL (ref 4–5.4)
RBC # BLD AUTO: 3.82 M/UL (ref 4–5.4)
RBC # BLD AUTO: 4.04 M/UL (ref 4–5.4)
SARS-COV-2 RDRP RESP QL NAA+PROBE: NEGATIVE
SARS-COV-2 RDRP RESP QL NAA+PROBE: NEGATIVE
SODIUM SERPL-SCNC: 134 MMOL/L (ref 136–145)
SODIUM SERPL-SCNC: 138 MMOL/L (ref 136–145)
SODIUM SERPL-SCNC: 140 MMOL/L (ref 136–145)
SODIUM SERPL-SCNC: 141 MMOL/L (ref 136–145)
SODIUM SERPL-SCNC: 142 MMOL/L (ref 136–145)
STRESS ECHO POST EXERCISE DUR MIN: 1 MINUTES
STRESS ECHO POST EXERCISE DUR SEC: 0 SECONDS
SYSTOLIC BLOOD PRESSURE: 156 MMHG
TRIGL SERPL-MCNC: 71 MG/DL (ref 30–150)
TROPONIN I SERPL DL<=0.01 NG/ML-MCNC: 0.03 NG/ML (ref 0–0.03)
TROPONIN I SERPL DL<=0.01 NG/ML-MCNC: 0.05 NG/ML (ref 0–0.03)
TSH SERPL DL<=0.005 MIU/L-ACNC: 1.46 UIU/ML (ref 0.4–4)
TSH SERPL DL<=0.005 MIU/L-ACNC: 2.5 UIU/ML (ref 0.4–4)
WBC # BLD AUTO: 6.16 K/UL (ref 3.9–12.7)
WBC # BLD AUTO: 6.48 K/UL (ref 3.9–12.7)
WBC # BLD AUTO: 6.93 K/UL (ref 3.9–12.7)
WBC # BLD AUTO: 7.01 K/UL (ref 3.9–12.7)

## 2020-01-01 PROCEDURE — 83880 ASSAY OF NATRIURETIC PEPTIDE: CPT

## 2020-01-01 PROCEDURE — G0257 UNSCHED DIALYSIS ESRD PT HOS: HCPCS

## 2020-01-01 PROCEDURE — 94761 N-INVAS EAR/PLS OXIMETRY MLT: CPT

## 2020-01-01 PROCEDURE — 84484 ASSAY OF TROPONIN QUANT: CPT

## 2020-01-01 PROCEDURE — 99285 EMERGENCY DEPT VISIT HI MDM: CPT | Mod: 25

## 2020-01-01 PROCEDURE — 84100 ASSAY OF PHOSPHORUS: CPT

## 2020-01-01 PROCEDURE — 85379 FIBRIN DEGRADATION QUANT: CPT

## 2020-01-01 PROCEDURE — 85025 COMPLETE CBC W/AUTO DIFF WBC: CPT

## 2020-01-01 PROCEDURE — 99220 PR INITIAL OBSERVATION CARE,LEVL III: ICD-10-PCS | Mod: ,,, | Performed by: PHYSICIAN ASSISTANT

## 2020-01-01 PROCEDURE — 83735 ASSAY OF MAGNESIUM: CPT

## 2020-01-01 PROCEDURE — 99217 PR OBSERVATION CARE DISCHARGE: CPT | Mod: ,,, | Performed by: PHYSICIAN ASSISTANT

## 2020-01-01 PROCEDURE — 93005 ELECTROCARDIOGRAM TRACING: CPT

## 2020-01-01 PROCEDURE — C9399 UNCLASSIFIED DRUGS OR BIOLOG: HCPCS | Performed by: PHYSICIAN ASSISTANT

## 2020-01-01 PROCEDURE — 85610 PROTHROMBIN TIME: CPT

## 2020-01-01 PROCEDURE — G0378 HOSPITAL OBSERVATION PER HR: HCPCS

## 2020-01-01 PROCEDURE — 93351 STRESS TTE COMPLETE: CPT

## 2020-01-01 PROCEDURE — 80053 COMPREHEN METABOLIC PANEL: CPT

## 2020-01-01 PROCEDURE — 25000003 PHARM REV CODE 250: Performed by: PHYSICIAN ASSISTANT

## 2020-01-01 PROCEDURE — 84443 ASSAY THYROID STIM HORMONE: CPT

## 2020-01-01 PROCEDURE — 63600175 PHARM REV CODE 636 W HCPCS: Performed by: PHYSICIAN ASSISTANT

## 2020-01-01 PROCEDURE — 93010 EKG 12-LEAD: ICD-10-PCS | Mod: ,,, | Performed by: INTERNAL MEDICINE

## 2020-01-01 PROCEDURE — 96375 TX/PRO/DX INJ NEW DRUG ADDON: CPT

## 2020-01-01 PROCEDURE — 96372 THER/PROPH/DIAG INJ SC/IM: CPT

## 2020-01-01 PROCEDURE — 80048 BASIC METABOLIC PNL TOTAL CA: CPT

## 2020-01-01 PROCEDURE — 27000221 HC OXYGEN, UP TO 24 HOURS

## 2020-01-01 PROCEDURE — 82550 ASSAY OF CK (CPK): CPT

## 2020-01-01 PROCEDURE — 93010 ELECTROCARDIOGRAM REPORT: CPT | Mod: ,,, | Performed by: INTERNAL MEDICINE

## 2020-01-01 PROCEDURE — 85730 THROMBOPLASTIN TIME PARTIAL: CPT

## 2020-01-01 PROCEDURE — 96372 THER/PROPH/DIAG INJ SC/IM: CPT | Mod: 59

## 2020-01-01 PROCEDURE — 93041 RHYTHM ECG TRACING: CPT

## 2020-01-01 PROCEDURE — 82553 CREATINE MB FRACTION: CPT

## 2020-01-01 PROCEDURE — G0379 DIRECT REFER HOSPITAL OBSERV: HCPCS

## 2020-01-01 PROCEDURE — 36415 COLL VENOUS BLD VENIPUNCTURE: CPT

## 2020-01-01 PROCEDURE — 99204 PR OFFICE/OUTPT VISIT, NEW, LEVL IV, 45-59 MIN: ICD-10-PCS | Mod: ,,, | Performed by: INTERNAL MEDICINE

## 2020-01-01 PROCEDURE — 80061 LIPID PANEL: CPT

## 2020-01-01 PROCEDURE — 83036 HEMOGLOBIN GLYCOSYLATED A1C: CPT

## 2020-01-01 PROCEDURE — 93351 STRESS ECHO (CUPID ONLY): ICD-10-PCS | Mod: 26,,, | Performed by: INTERNAL MEDICINE

## 2020-01-01 PROCEDURE — 99220 PR INITIAL OBSERVATION CARE,LEVL III: CPT | Mod: ,,, | Performed by: PHYSICIAN ASSISTANT

## 2020-01-01 PROCEDURE — 99900035 HC TECH TIME PER 15 MIN (STAT)

## 2020-01-01 PROCEDURE — 63600175 PHARM REV CODE 636 W HCPCS: Performed by: SURGERY

## 2020-01-01 PROCEDURE — 25000003 PHARM REV CODE 250: Performed by: NURSE PRACTITIONER

## 2020-01-01 PROCEDURE — 99204 OFFICE O/P NEW MOD 45 MIN: CPT | Mod: ,,, | Performed by: INTERNAL MEDICINE

## 2020-01-01 PROCEDURE — U0002 COVID-19 LAB TEST NON-CDC: HCPCS

## 2020-01-01 PROCEDURE — 93351 STRESS TTE COMPLETE: CPT | Mod: 26,,, | Performed by: INTERNAL MEDICINE

## 2020-01-01 PROCEDURE — 25000003 PHARM REV CODE 250: Performed by: SURGERY

## 2020-01-01 PROCEDURE — 99217 PR OBSERVATION CARE DISCHARGE: ICD-10-PCS | Mod: ,,, | Performed by: PHYSICIAN ASSISTANT

## 2020-01-01 PROCEDURE — 96374 THER/PROPH/DIAG INJ IV PUSH: CPT

## 2020-01-01 PROCEDURE — 84484 ASSAY OF TROPONIN QUANT: CPT | Mod: 91

## 2020-01-01 RX ORDER — ONDANSETRON 2 MG/ML
4 INJECTION INTRAMUSCULAR; INTRAVENOUS EVERY 8 HOURS PRN
Status: DISCONTINUED | OUTPATIENT
Start: 2020-01-01 | End: 2020-01-01 | Stop reason: HOSPADM

## 2020-01-01 RX ORDER — SODIUM CHLORIDE 0.9 % (FLUSH) 0.9 %
10 SYRINGE (ML) INJECTION
Status: DISCONTINUED | OUTPATIENT
Start: 2020-01-01 | End: 2020-01-01 | Stop reason: HOSPADM

## 2020-01-01 RX ORDER — ONDANSETRON 2 MG/ML
4 INJECTION INTRAMUSCULAR; INTRAVENOUS
Status: DISCONTINUED | OUTPATIENT
Start: 2020-01-01 | End: 2020-01-01

## 2020-01-01 RX ORDER — DOXYCYCLINE 100 MG/1
100 CAPSULE ORAL 2 TIMES DAILY
Qty: 14 CAPSULE | Refills: 0 | Status: SHIPPED | OUTPATIENT
Start: 2020-01-01 | End: 2020-01-01

## 2020-01-01 RX ORDER — ONDANSETRON 2 MG/ML
4 INJECTION INTRAMUSCULAR; INTRAVENOUS
Status: COMPLETED | OUTPATIENT
Start: 2020-01-01 | End: 2020-01-01

## 2020-01-01 RX ORDER — LISINOPRIL 2.5 MG/1
2.5 TABLET ORAL DAILY
Status: DISCONTINUED | OUTPATIENT
Start: 2020-01-01 | End: 2020-01-01 | Stop reason: HOSPADM

## 2020-01-01 RX ORDER — PANTOPRAZOLE SODIUM 40 MG/1
40 TABLET, DELAYED RELEASE ORAL
Status: COMPLETED | OUTPATIENT
Start: 2020-01-01 | End: 2020-01-01

## 2020-01-01 RX ORDER — ACETAMINOPHEN 325 MG/1
650 TABLET ORAL EVERY 4 HOURS PRN
Status: DISCONTINUED | OUTPATIENT
Start: 2020-01-01 | End: 2020-01-01 | Stop reason: HOSPADM

## 2020-01-01 RX ORDER — ASPIRIN 325 MG
325 TABLET ORAL
Status: COMPLETED | OUTPATIENT
Start: 2020-01-01 | End: 2020-01-01

## 2020-01-01 RX ORDER — GLUCAGON 1 MG
1 KIT INJECTION
Status: DISCONTINUED | OUTPATIENT
Start: 2020-01-01 | End: 2020-01-01 | Stop reason: HOSPADM

## 2020-01-01 RX ORDER — ISOSORBIDE MONONITRATE 30 MG/1
30 TABLET, EXTENDED RELEASE ORAL DAILY
Status: DISCONTINUED | OUTPATIENT
Start: 2020-01-01 | End: 2020-01-01

## 2020-01-01 RX ORDER — METOPROLOL TARTRATE 25 MG/1
25 TABLET, FILM COATED ORAL
Status: COMPLETED | OUTPATIENT
Start: 2020-01-01 | End: 2020-01-01

## 2020-01-01 RX ORDER — IBUPROFEN 200 MG
16 TABLET ORAL
Status: DISCONTINUED | OUTPATIENT
Start: 2020-01-01 | End: 2020-01-01 | Stop reason: HOSPADM

## 2020-01-01 RX ORDER — MORPHINE SULFATE 2 MG/ML
2 INJECTION, SOLUTION INTRAMUSCULAR; INTRAVENOUS EVERY 4 HOURS PRN
Status: DISCONTINUED | OUTPATIENT
Start: 2020-01-01 | End: 2020-01-01

## 2020-01-01 RX ORDER — SODIUM CHLORIDE 9 MG/ML
INJECTION, SOLUTION INTRAVENOUS ONCE
Status: DISCONTINUED | OUTPATIENT
Start: 2020-01-01 | End: 2020-01-01 | Stop reason: HOSPADM

## 2020-01-01 RX ORDER — ATORVASTATIN CALCIUM 10 MG/1
10 TABLET, FILM COATED ORAL NIGHTLY
Status: DISCONTINUED | OUTPATIENT
Start: 2020-01-01 | End: 2020-01-01 | Stop reason: HOSPADM

## 2020-01-01 RX ORDER — NITROGLYCERIN 0.4 MG/1
0.4 TABLET SUBLINGUAL EVERY 5 MIN PRN
Status: DISCONTINUED | OUTPATIENT
Start: 2020-01-01 | End: 2020-01-01 | Stop reason: HOSPADM

## 2020-01-01 RX ORDER — ISOSORBIDE MONONITRATE 30 MG/1
30 TABLET, EXTENDED RELEASE ORAL DAILY
Start: 2020-01-01 | End: 2021-01-01 | Stop reason: SDUPTHER

## 2020-01-01 RX ORDER — ASPIRIN 81 MG/1
81 TABLET ORAL DAILY
Status: DISCONTINUED | OUTPATIENT
Start: 2020-01-01 | End: 2020-01-01

## 2020-01-01 RX ORDER — FUROSEMIDE 40 MG/1
80 TABLET ORAL 2 TIMES DAILY
Status: DISCONTINUED | OUTPATIENT
Start: 2020-01-01 | End: 2020-01-01 | Stop reason: HOSPADM

## 2020-01-01 RX ORDER — PROMETHAZINE HYDROCHLORIDE AND DEXTROMETHORPHAN HYDROBROMIDE 6.25; 15 MG/5ML; MG/5ML
5 SYRUP ORAL EVERY 6 HOURS PRN
Qty: 120 ML | Refills: 0 | Status: SHIPPED | OUTPATIENT
Start: 2020-01-01 | End: 2020-01-01

## 2020-01-01 RX ORDER — IBUPROFEN 200 MG
24 TABLET ORAL
Status: DISCONTINUED | OUTPATIENT
Start: 2020-01-01 | End: 2020-01-01 | Stop reason: HOSPADM

## 2020-01-01 RX ORDER — GABAPENTIN 100 MG/1
100 CAPSULE ORAL 3 TIMES DAILY
Status: DISCONTINUED | OUTPATIENT
Start: 2020-01-01 | End: 2020-01-01 | Stop reason: HOSPADM

## 2020-01-01 RX ORDER — INSULIN ASPART 100 [IU]/ML
1-10 INJECTION, SOLUTION INTRAVENOUS; SUBCUTANEOUS
Status: DISCONTINUED | OUTPATIENT
Start: 2020-01-01 | End: 2020-01-01 | Stop reason: HOSPADM

## 2020-01-01 RX ORDER — ATORVASTATIN CALCIUM 40 MG/1
40 TABLET, FILM COATED ORAL
Status: COMPLETED | OUTPATIENT
Start: 2020-01-01 | End: 2020-01-01

## 2020-01-01 RX ORDER — MUPIROCIN 20 MG/G
OINTMENT TOPICAL 2 TIMES DAILY
Status: DISCONTINUED | OUTPATIENT
Start: 2020-01-01 | End: 2020-01-01 | Stop reason: HOSPADM

## 2020-01-01 RX ORDER — MORPHINE SULFATE 2 MG/ML
2 INJECTION, SOLUTION INTRAMUSCULAR; INTRAVENOUS
Status: COMPLETED | OUTPATIENT
Start: 2020-01-01 | End: 2020-01-01

## 2020-01-01 RX ORDER — MORPHINE SULFATE 2 MG/ML
2 INJECTION, SOLUTION INTRAMUSCULAR; INTRAVENOUS
Status: DISCONTINUED | OUTPATIENT
Start: 2020-01-01 | End: 2020-01-01

## 2020-01-01 RX ORDER — CITALOPRAM 10 MG/1
10 TABLET ORAL DAILY
Status: DISCONTINUED | OUTPATIENT
Start: 2020-01-01 | End: 2020-01-01 | Stop reason: HOSPADM

## 2020-01-01 RX ORDER — LEVOTHYROXINE SODIUM 75 UG/1
150 TABLET ORAL
Status: DISCONTINUED | OUTPATIENT
Start: 2020-01-01 | End: 2020-01-01 | Stop reason: HOSPADM

## 2020-01-01 RX ORDER — POLYETHYLENE GLYCOL 3350 17 G/17G
17 POWDER, FOR SOLUTION ORAL DAILY
Status: DISCONTINUED | OUTPATIENT
Start: 2020-01-01 | End: 2020-01-01 | Stop reason: HOSPADM

## 2020-01-01 RX ORDER — RANOLAZINE 1000 MG/1
1000 TABLET, EXTENDED RELEASE ORAL 2 TIMES DAILY
Start: 2020-01-01 | End: 2020-01-01 | Stop reason: SDUPTHER

## 2020-01-01 RX ADMIN — POLYETHYLENE GLYCOL 3350 17 G: 17 POWDER, FOR SOLUTION ORAL at 09:08

## 2020-01-01 RX ADMIN — NITROGLYCERIN 1 INCH: 20 OINTMENT TOPICAL at 11:08

## 2020-01-01 RX ADMIN — APIXABAN 2.5 MG: 2.5 TABLET, FILM COATED ORAL at 09:08

## 2020-01-01 RX ADMIN — GABAPENTIN 100 MG: 100 CAPSULE ORAL at 08:08

## 2020-01-01 RX ADMIN — CITALOPRAM HYDROBROMIDE 10 MG: 10 TABLET ORAL at 09:08

## 2020-01-01 RX ADMIN — MORPHINE SULFATE 2 MG: 2 INJECTION, SOLUTION INTRAMUSCULAR; INTRAVENOUS at 11:08

## 2020-01-01 RX ADMIN — GABAPENTIN 100 MG: 100 CAPSULE ORAL at 09:08

## 2020-01-01 RX ADMIN — ATORVASTATIN CALCIUM 10 MG: 10 TABLET, FILM COATED ORAL at 08:08

## 2020-01-01 RX ADMIN — MUPIROCIN: 20 OINTMENT TOPICAL at 09:08

## 2020-01-01 RX ADMIN — LEVOTHYROXINE SODIUM 150 MCG: 75 TABLET ORAL at 06:08

## 2020-01-01 RX ADMIN — LEVOTHYROXINE SODIUM 150 MCG: 75 TABLET ORAL at 07:08

## 2020-01-01 RX ADMIN — ATORVASTATIN CALCIUM 40 MG: 40 TABLET, FILM COATED ORAL at 11:08

## 2020-01-01 RX ADMIN — LISINOPRIL 2.5 MG: 2.5 TABLET ORAL at 09:08

## 2020-01-01 RX ADMIN — METOPROLOL TARTRATE 25 MG: 25 TABLET ORAL at 11:08

## 2020-01-01 RX ADMIN — FUROSEMIDE 80 MG: 40 TABLET ORAL at 09:08

## 2020-01-01 RX ADMIN — ASPIRIN 81 MG: 81 TABLET, COATED ORAL at 09:08

## 2020-01-01 RX ADMIN — NEPHROCAP 1 CAPSULE: 1 CAP ORAL at 09:08

## 2020-01-01 RX ADMIN — INSULIN DETEMIR 5 UNITS: 100 INJECTION, SOLUTION SUBCUTANEOUS at 11:08

## 2020-01-01 RX ADMIN — ASPIRIN 325 MG ORAL TABLET 325 MG: 325 PILL ORAL at 08:08

## 2020-01-01 RX ADMIN — PANTOPRAZOLE SODIUM 40 MG: 40 TABLET, DELAYED RELEASE ORAL at 11:08

## 2020-01-01 RX ADMIN — ONDANSETRON 4 MG: 2 INJECTION INTRAMUSCULAR; INTRAVENOUS at 11:08

## 2020-01-01 RX ADMIN — FUROSEMIDE 80 MG: 40 TABLET ORAL at 08:08

## 2020-01-01 RX ADMIN — ISOSORBIDE MONONITRATE 30 MG: 30 TABLET, EXTENDED RELEASE ORAL at 09:08

## 2020-01-01 RX ADMIN — POLYETHYLENE GLYCOL 3350 17 G: 17 POWDER, FOR SOLUTION ORAL at 12:08

## 2020-01-01 RX ADMIN — INSULIN DETEMIR 5 UNITS: 100 INJECTION, SOLUTION SUBCUTANEOUS at 09:08

## 2020-01-01 RX ADMIN — APIXABAN 2.5 MG: 2.5 TABLET, FILM COATED ORAL at 08:08

## 2020-01-01 RX ADMIN — INSULIN ASPART 1 UNITS: 100 INJECTION, SOLUTION INTRAVENOUS; SUBCUTANEOUS at 09:08

## 2020-01-03 NOTE — TELEPHONE ENCOUNTER
Praluent refill confirmed. We will ship Praluent refill on  via fedex to arrive on . $3.90 copay- 004. Confirmed 2 patient identifiers - name and . Therapy appropriate.     Patient has 0 doses of Praluent remaining and injects on the 1st and 15th of each month.  Pt reports they are not having any side effects so far. Patient missed dose that was due on . Informed patient that we have been attempting to reach her for refill and confirmed phone numbers on file. Advised patient to give us a call when she uses her last injection if she has not heard from us and knows her next injection is approaching. No new medications, no new allergies or health conditions reported at this time. Patient DOES need a sharps container. All questions answered and addressed to patients satisfaction. Advised to call [facility] and provider if any issues arise.  Pt verbalized understanding.

## 2020-02-04 ENCOUNTER — TELEPHONE (OUTPATIENT)
Dept: PHARMACY | Facility: CLINIC | Age: 60
End: 2020-02-04

## 2020-02-10 NOTE — TELEPHONE ENCOUNTER
Call attempt 3 - F/U on Praluent adherence and arrange next refill.  NA/LVM.  Mychart from 2/4/20 has not yet been read.

## 2020-02-12 ENCOUNTER — PATIENT MESSAGE (OUTPATIENT)
Dept: PHARMACY | Facility: CLINIC | Age: 60
End: 2020-02-12

## 2020-02-14 ENCOUNTER — LAB VISIT (OUTPATIENT)
Dept: LAB | Facility: HOSPITAL | Age: 60
End: 2020-02-14
Attending: STUDENT IN AN ORGANIZED HEALTH CARE EDUCATION/TRAINING PROGRAM
Payer: MEDICARE

## 2020-02-14 DIAGNOSIS — E55.9 VITAMIN D DEFICIENCY: ICD-10-CM

## 2020-02-14 DIAGNOSIS — E11.3399: ICD-10-CM

## 2020-02-14 DIAGNOSIS — E08.22 DIABETES MELLITUS DUE TO UNDERLYING CONDITION WITH DIABETIC CHRONIC KIDNEY DISEASE: ICD-10-CM

## 2020-02-14 DIAGNOSIS — E78.5 HYPERLIPIDEMIA, UNSPECIFIED HYPERLIPIDEMIA TYPE: ICD-10-CM

## 2020-02-14 DIAGNOSIS — E03.4 HYPOTHYROIDISM DUE TO ACQUIRED ATROPHY OF THYROID: ICD-10-CM

## 2020-02-14 LAB
25(OH)D3+25(OH)D2 SERPL-MCNC: 18 NG/ML (ref 30–96)
ANION GAP SERPL CALC-SCNC: 14 MMOL/L (ref 8–16)
BUN SERPL-MCNC: 27 MG/DL (ref 6–20)
CALCIUM SERPL-MCNC: 9.6 MG/DL (ref 8.7–10.5)
CHLORIDE SERPL-SCNC: 96 MMOL/L (ref 95–110)
CHOLEST SERPL-MCNC: 222 MG/DL (ref 120–199)
CHOLEST/HDLC SERPL: 3.3 {RATIO} (ref 2–5)
CO2 SERPL-SCNC: 27 MMOL/L (ref 23–29)
CREAT SERPL-MCNC: 4.4 MG/DL (ref 0.5–1.4)
EST. GFR  (AFRICAN AMERICAN): 12 ML/MIN/1.73 M^2
EST. GFR  (NON AFRICAN AMERICAN): 10 ML/MIN/1.73 M^2
ESTIMATED AVG GLUCOSE: 197 MG/DL (ref 68–131)
GLUCOSE SERPL-MCNC: 304 MG/DL (ref 70–110)
HBA1C MFR BLD HPLC: 8.5 % (ref 4–5.6)
HDLC SERPL-MCNC: 67 MG/DL (ref 40–75)
HDLC SERPL: 30.2 % (ref 20–50)
LDLC SERPL CALC-MCNC: 140.2 MG/DL (ref 63–159)
NONHDLC SERPL-MCNC: 155 MG/DL
POTASSIUM SERPL-SCNC: 3.8 MMOL/L (ref 3.5–5.1)
SODIUM SERPL-SCNC: 137 MMOL/L (ref 136–145)
TRIGL SERPL-MCNC: 74 MG/DL (ref 30–150)
TSH SERPL DL<=0.005 MIU/L-ACNC: 3.94 UIU/ML (ref 0.4–4)

## 2020-02-14 PROCEDURE — 80048 BASIC METABOLIC PNL TOTAL CA: CPT

## 2020-02-14 PROCEDURE — 82306 VITAMIN D 25 HYDROXY: CPT

## 2020-02-14 PROCEDURE — 84443 ASSAY THYROID STIM HORMONE: CPT

## 2020-02-14 PROCEDURE — 36415 COLL VENOUS BLD VENIPUNCTURE: CPT

## 2020-02-14 PROCEDURE — 83036 HEMOGLOBIN GLYCOSYLATED A1C: CPT

## 2020-02-14 PROCEDURE — 80061 LIPID PANEL: CPT

## 2020-02-29 PROBLEM — M79.641 RIGHT HAND PAIN: Status: ACTIVE | Noted: 2020-02-29

## 2020-02-29 PROBLEM — R73.9 HYPERGLYCEMIA: Status: ACTIVE | Noted: 2020-02-29

## 2020-02-29 PROBLEM — R74.01 TRANSAMINITIS: Status: ACTIVE | Noted: 2020-02-29

## 2020-03-24 ENCOUNTER — HOSPITAL ENCOUNTER (EMERGENCY)
Facility: HOSPITAL | Age: 60
Discharge: HOME | End: 2020-03-24
Attending: SURGERY
Payer: MEDICARE

## 2020-03-24 VITALS
SYSTOLIC BLOOD PRESSURE: 135 MMHG | OXYGEN SATURATION: 100 % | HEART RATE: 69 BPM | RESPIRATION RATE: 20 BRPM | WEIGHT: 293 LBS | TEMPERATURE: 99 F | DIASTOLIC BLOOD PRESSURE: 62 MMHG | BODY MASS INDEX: 85.37 KG/M2

## 2020-03-24 DIAGNOSIS — R05.9 COUGH: ICD-10-CM

## 2020-03-24 DIAGNOSIS — J00 ACUTE NASOPHARYNGITIS: Primary | ICD-10-CM

## 2020-03-24 LAB
ALBUMIN SERPL BCP-MCNC: 3.5 G/DL (ref 3.5–5.2)
ALP SERPL-CCNC: 219 U/L (ref 55–135)
ALT SERPL W/O P-5'-P-CCNC: 48 U/L (ref 10–44)
ANION GAP SERPL CALC-SCNC: 11 MMOL/L (ref 8–16)
AST SERPL-CCNC: 28 U/L (ref 10–40)
BASOPHILS # BLD AUTO: 0.02 K/UL (ref 0–0.2)
BASOPHILS NFR BLD: 0.3 % (ref 0–1.9)
BILIRUB SERPL-MCNC: 0.8 MG/DL (ref 0.1–1)
BNP SERPL-MCNC: 362 PG/ML (ref 0–99)
BUN SERPL-MCNC: 24 MG/DL (ref 6–20)
CALCIUM SERPL-MCNC: 8.4 MG/DL (ref 8.7–10.5)
CHLORIDE SERPL-SCNC: 96 MMOL/L (ref 95–110)
CK MB SERPL-MCNC: 2.5 NG/ML (ref 0.1–6.5)
CK MB SERPL-RTO: 3.4 % (ref 0–5)
CK SERPL-CCNC: 73 U/L (ref 20–180)
CK SERPL-CCNC: 73 U/L (ref 20–180)
CO2 SERPL-SCNC: 29 MMOL/L (ref 23–29)
CREAT SERPL-MCNC: 3.9 MG/DL (ref 0.5–1.4)
DIFFERENTIAL METHOD: ABNORMAL
EOSINOPHIL # BLD AUTO: 0.2 K/UL (ref 0–0.5)
EOSINOPHIL NFR BLD: 3.7 % (ref 0–8)
ERYTHROCYTE [DISTWIDTH] IN BLOOD BY AUTOMATED COUNT: 14.8 % (ref 11.5–14.5)
EST. GFR  (AFRICAN AMERICAN): 14 ML/MIN/1.73 M^2
EST. GFR  (NON AFRICAN AMERICAN): 12 ML/MIN/1.73 M^2
GLUCOSE SERPL-MCNC: 249 MG/DL (ref 70–110)
GROUP A STREP, MOLECULAR: NEGATIVE
HCT VFR BLD AUTO: 33 % (ref 37–48.5)
HGB BLD-MCNC: 10.7 G/DL (ref 12–16)
IMM GRANULOCYTES # BLD AUTO: 0.03 K/UL (ref 0–0.04)
IMM GRANULOCYTES NFR BLD AUTO: 0.5 % (ref 0–0.5)
INFLUENZA A, MOLECULAR: NEGATIVE
INFLUENZA B, MOLECULAR: NEGATIVE
LYMPHOCYTES # BLD AUTO: 1.1 K/UL (ref 1–4.8)
LYMPHOCYTES NFR BLD: 17.1 % (ref 18–48)
MCH RBC QN AUTO: 29.9 PG (ref 27–31)
MCHC RBC AUTO-ENTMCNC: 32.4 G/DL (ref 32–36)
MCV RBC AUTO: 92 FL (ref 82–98)
MONOCYTES # BLD AUTO: 0.7 K/UL (ref 0.3–1)
MONOCYTES NFR BLD: 10.7 % (ref 4–15)
NEUTROPHILS # BLD AUTO: 4.2 K/UL (ref 1.8–7.7)
NEUTROPHILS NFR BLD: 67.7 % (ref 38–73)
NRBC BLD-RTO: 0 /100 WBC
PLATELET # BLD AUTO: 161 K/UL (ref 150–350)
PMV BLD AUTO: 11.1 FL (ref 9.2–12.9)
POTASSIUM SERPL-SCNC: 3.6 MMOL/L (ref 3.5–5.1)
PROT SERPL-MCNC: 7.6 G/DL (ref 6–8.4)
RBC # BLD AUTO: 3.58 M/UL (ref 4–5.4)
SODIUM SERPL-SCNC: 136 MMOL/L (ref 136–145)
SPECIMEN SOURCE: NORMAL
TROPONIN I SERPL DL<=0.01 NG/ML-MCNC: 0.04 NG/ML (ref 0–0.03)
WBC # BLD AUTO: 6.24 K/UL (ref 3.9–12.7)

## 2020-03-24 PROCEDURE — 87651 STREP A DNA AMP PROBE: CPT

## 2020-03-24 PROCEDURE — 87502 INFLUENZA DNA AMP PROBE: CPT

## 2020-03-24 PROCEDURE — 93010 EKG 12-LEAD: ICD-10-PCS | Mod: ,,, | Performed by: INTERNAL MEDICINE

## 2020-03-24 PROCEDURE — 82550 ASSAY OF CK (CPK): CPT

## 2020-03-24 PROCEDURE — 96372 THER/PROPH/DIAG INJ SC/IM: CPT

## 2020-03-24 PROCEDURE — 85025 COMPLETE CBC W/AUTO DIFF WBC: CPT

## 2020-03-24 PROCEDURE — 36415 COLL VENOUS BLD VENIPUNCTURE: CPT

## 2020-03-24 PROCEDURE — 63600175 PHARM REV CODE 636 W HCPCS: Performed by: SURGERY

## 2020-03-24 PROCEDURE — 99285 EMERGENCY DEPT VISIT HI MDM: CPT | Mod: 25

## 2020-03-24 PROCEDURE — 80053 COMPREHEN METABOLIC PANEL: CPT

## 2020-03-24 PROCEDURE — 83880 ASSAY OF NATRIURETIC PEPTIDE: CPT

## 2020-03-24 PROCEDURE — 82553 CREATINE MB FRACTION: CPT

## 2020-03-24 PROCEDURE — 93005 ELECTROCARDIOGRAM TRACING: CPT

## 2020-03-24 PROCEDURE — 84484 ASSAY OF TROPONIN QUANT: CPT

## 2020-03-24 PROCEDURE — 93010 ELECTROCARDIOGRAM REPORT: CPT | Mod: ,,, | Performed by: INTERNAL MEDICINE

## 2020-03-24 RX ORDER — BENZONATATE 100 MG/1
200 CAPSULE ORAL 3 TIMES DAILY PRN
Qty: 20 CAPSULE | Refills: 0 | Status: SHIPPED | OUTPATIENT
Start: 2020-03-24 | End: 2020-04-03

## 2020-03-24 RX ORDER — AZITHROMYCIN 250 MG/1
TABLET, FILM COATED ORAL
Qty: 6 TABLET | Refills: 0 | Status: SHIPPED | OUTPATIENT
Start: 2020-03-24 | End: 2020-05-06

## 2020-03-24 RX ORDER — CEFTRIAXONE 1 G/1
1 INJECTION, POWDER, FOR SOLUTION INTRAMUSCULAR; INTRAVENOUS
Status: COMPLETED | OUTPATIENT
Start: 2020-03-24 | End: 2020-03-24

## 2020-03-24 RX ADMIN — CEFTRIAXONE SODIUM 1 G: 1 INJECTION, POWDER, FOR SOLUTION INTRAMUSCULAR; INTRAVENOUS at 10:03

## 2020-03-25 NOTE — ED PROVIDER NOTES
"[facility] Emergency Room                                                 Chief Complaint  59 y.o. female with Cough   -- "I started coughing a few days ago but the body aches started today."     History of Present Illness  Caryn Mauricio presents to the emergency room with cough today  Patient with body aches and cough with no fever on ER triage tonight  Patient on exam has clear nasal drainage with nasal mucosa erythema  Patient has clear lung sounds with no wheezing or sputum reported now  Pt with no nausea vomiting or diarrhea, no fever or shortness of breath  ROS today shows no signs or symptoms suspicious for coronavirus     The history is provided by the patient   device was not used during this ER visit    Past Medical History   -- AI (aortic insufficiency)      -- Anemia      -- Anticoagulant long-term use      -- Asthma      -- CKD (chronic kidney disease) stage 4, GFR 15-29 ml/min      -- Congestion of upper airway      -- Coronary artery disease      -- Depression      -- Diabetes mellitus      -- Diabetes mellitus type 2 in obese      -- Dyspnea      -- E. coli UTI      -- Encounter for blood transfusion      -- Heart failure with preserved ejection fraction      -- Hyperlipemia      -- Hypertension      -- Hypothyroidism      -- Neuropathy      -- PAF (paroxysmal atrial fibrillation)      -- Shortness of breath      -- Type 2 diabetes mellitus with diabetic nephropathy          Past Surgical History   -- Tubal ligation         -- Hysterectomy         -- Tonsillectomy         -- Abscess drainage         -- Colonoscopy         -- Cardiac catheterization         -- Av fistula placement, brachiocephalic         -- Colonoscopy         -- Upper gastrointestinal endoscopy             Allergies   -- Iodine And Iodide Containing Products      I have reviewed all of this patient's past medical, surgical, family, and social   histories as well as active allergies and medications " documented in the  electronic medical record    Review of Systems and Physical Exam      Review of Systems  -- Constitution - no fever, denies fatigue, no weakness, no chills  -- Eyes - no tearing or redness, no visual disturbance  -- Ear, Nose - sneezing, nasal congestion and clear discharge   -- Mouth,Throat - sore throat, no toothache, normal voice, normal swallowing  -- Respiratory - cough and congestion, no shortness of breath, no MOSQUEDA  -- Cardiovascular - denies chest pain, no palpitations, denies claudication  -- Gastrointestinal - denies abdominal pain, nausea, vomiting, or diarrhea  -- Genitourinary - no dysuria, no hematuria, no flank pain, no bladder pain  -- Musculoskeletal - denies back pain, negative for trauma or injury  -- Neurological - no headache, denies weakness or seizure; no LOC  -- Skin - denies pallor, rash, or changes in skin. no hives or welts noted     BP (!) 151/71  Pulse 75   Temp 98.7 °F (37.1 °C) (Oral)   Resp 18    Wt (!) 225.6 kg  LMP 12/23/2000 (LMP Unknown)   SpO2 97%     Physical Exam  -- Nursing note and vitals reviewed  -- Constitutional: Appears well-developed and well-nourished  -- Head: Atraumatic. Normocephalic. No obvious abnormality  -- Eyes: Pupils are equal and reactive to light. Normal conjunctiva and lids  -- Nose: nasal mucosa erythema and edema; clear nasal discharge noted   -- Throat: post-nasal drip with mild posterior oropharnyx erythema  -- Ears: External ears and TM normal bilaterally. Normal hearing and no drainage  -- Neck: Normal range of motion. Neck supple. No masses, trachea midline  -- Cardiac: Normal rate, regular rhythm and normal heart sounds  -- Pulmonary: Normal respiratory effort, breath sounds clear to auscultation  -- Abdominal: Soft, no tenderness. Normal bowel sounds. Normal liver edge  -- Musculoskeletal: Normal range of motion, no effusions. Joints stable   -- Neurological: No focal deficits. Showed good interaction with staff  --  Vascular: Posterior tibial, dorsalis pedis and radial pulses 2+ bilaterally       Emergency Room Course      Lab Results     K 3.6   CL 96   CO2 29   BUN 24 (H)   CREATININE 3.9 (H)    (H)   ALKPHOS 219 (H)   AST 28   ALT 48 (H)   BILITOT 0.8   ALBUMIN 3.5   PROT 7.6   WBC 6.24   HGB 10.7 (L)   HCT 33.0 (L)      CPK 73   CPK 73   CPKMB 2.5   TROPONINI 0.037 (H)   INR 1.1    (H)   LACTATE 1.0   MG 2.2   TSH 3.775     EKG   -- The EKG findings today were without concerning findings from baseline     Radiology  -- Chest x-ray showed no infiltrate and showed no acute pathology     Additional Work up  -- the patient tested negative for influenza   -- The strep screen was negative     Medications Given  -- PO 1 g Tylenol given in today in the ER    Coronavirus Testing Criteria  -- Does the patient have symptoms and fever? No  -- Did the patient have a negative flu test: Yes  -- Healthcare worker in contact with COVID? No  -- Pregnant woman? No  -- Immunocompromise patient? No  -- Lives in communal setting? No  -- Infant less than 10 weeks of age? No  -- Did not meet any testing criteria set forth by [facility] guidelines     Diagnosis  -- The primary encounter diagnosis was Acute nasopharyngitis.   -- A diagnosis of Cough was also pertinent to this visit.    Disposition and Plan  -- Disposition: home  -- Condition: stable  -- Follow-up: Patient to follow up with Travis Newell MD in 1-2 days.  -- I advised the patient that we have found no life threatening condition today  -- At this time, I believe the patient is clinically stable for discharge.   -- The patient acknowledges that close follow up with a MD is required   -- Patient agrees to comply with all instruction and direction given in the ER    This note is dictated on M*Modal word recognition program.  There are word recognition mistakes that are occasionally missed on review.         Sunny Reed MD  03/24/20 4747

## 2020-05-14 ENCOUNTER — HOSPITAL ENCOUNTER (OUTPATIENT)
Facility: HOSPITAL | Age: 60
Discharge: HOME | End: 2020-05-15
Attending: FAMILY MEDICINE | Admitting: FAMILY MEDICINE
Payer: MEDICARE

## 2020-05-14 DIAGNOSIS — E11.65 HYPERGLYCEMIA DUE TO TYPE 2 DIABETES MELLITUS: ICD-10-CM

## 2020-05-14 DIAGNOSIS — E11.10 DIABETIC KETOACIDOSIS WITHOUT COMA ASSOCIATED WITH TYPE 2 DIABETES MELLITUS: Primary | ICD-10-CM

## 2020-05-14 LAB
ALBUMIN SERPL BCP-MCNC: 4.1 G/DL (ref 3.5–5.2)
ALP SERPL-CCNC: 96 U/L (ref 55–135)
ALT SERPL W/O P-5'-P-CCNC: 23 U/L (ref 10–44)
ANION GAP SERPL CALC-SCNC: 18 MMOL/L (ref 8–16)
AST SERPL-CCNC: 16 U/L (ref 10–40)
B-OH-BUTYR BLD STRIP-SCNC: 0 MMOL/L (ref 0–0.5)
BACTERIA #/AREA URNS HPF: ABNORMAL /HPF
BASOPHILS # BLD AUTO: 0.02 K/UL (ref 0–0.2)
BASOPHILS NFR BLD: 0.3 % (ref 0–1.9)
BILIRUB SERPL-MCNC: 0.6 MG/DL (ref 0.1–1)
BILIRUB UR QL STRIP: ABNORMAL
BUN SERPL-MCNC: 44 MG/DL (ref 6–20)
CALCIUM SERPL-MCNC: 9.7 MG/DL (ref 8.7–10.5)
CHLORIDE SERPL-SCNC: 96 MMOL/L (ref 95–110)
CLARITY UR: ABNORMAL
CO2 SERPL-SCNC: 25 MMOL/L (ref 23–29)
COLOR UR: ABNORMAL
CREAT SERPL-MCNC: 6.4 MG/DL (ref 0.5–1.4)
DIFFERENTIAL METHOD: NORMAL
EOSINOPHIL # BLD AUTO: 0.2 K/UL (ref 0–0.5)
EOSINOPHIL NFR BLD: 3.1 % (ref 0–8)
ERYTHROCYTE [DISTWIDTH] IN BLOOD BY AUTOMATED COUNT: 13.2 % (ref 11.5–14.5)
EST. GFR  (AFRICAN AMERICAN): 8 ML/MIN/1.73 M^2
EST. GFR  (NON AFRICAN AMERICAN): 7 ML/MIN/1.73 M^2
GLUCOSE SERPL-MCNC: 354 MG/DL (ref 70–110)
GLUCOSE UR QL STRIP: ABNORMAL
HCT VFR BLD AUTO: 37.6 % (ref 37–48.5)
HGB BLD-MCNC: 12.2 G/DL (ref 12–16)
HGB UR QL STRIP: ABNORMAL
HYALINE CASTS #/AREA URNS LPF: 30 /LPF
IMM GRANULOCYTES # BLD AUTO: 0.02 K/UL (ref 0–0.04)
IMM GRANULOCYTES NFR BLD AUTO: 0.3 % (ref 0–0.5)
KETONES UR QL STRIP: ABNORMAL
LEUKOCYTE ESTERASE UR QL STRIP: NEGATIVE
LYMPHOCYTES # BLD AUTO: 2 K/UL (ref 1–4.8)
LYMPHOCYTES NFR BLD: 28.7 % (ref 18–48)
MCH RBC QN AUTO: 28.9 PG (ref 27–31)
MCHC RBC AUTO-ENTMCNC: 32.4 G/DL (ref 32–36)
MCV RBC AUTO: 89 FL (ref 82–98)
MICROSCOPIC COMMENT: ABNORMAL
MONOCYTES # BLD AUTO: 0.6 K/UL (ref 0.3–1)
MONOCYTES NFR BLD: 8.8 % (ref 4–15)
NEUTROPHILS # BLD AUTO: 4.1 K/UL (ref 1.8–7.7)
NEUTROPHILS NFR BLD: 58.8 % (ref 38–73)
NITRITE UR QL STRIP: NEGATIVE
NRBC BLD-RTO: 0 /100 WBC
PH UR STRIP: 5 [PH] (ref 5–8)
PLATELET # BLD AUTO: 226 K/UL (ref 150–350)
PMV BLD AUTO: 10.2 FL (ref 9.2–12.9)
POC PH VENOUS: 7.37
POCT GLUCOSE: 301 MG/DL (ref 70–110)
POCT GLUCOSE: 323 MG/DL (ref 70–110)
POTASSIUM SERPL-SCNC: 3.8 MMOL/L (ref 3.5–5.1)
PROT SERPL-MCNC: 8.6 G/DL (ref 6–8.4)
PROT UR QL STRIP: ABNORMAL
RBC # BLD AUTO: 4.22 M/UL (ref 4–5.4)
RBC #/AREA URNS HPF: 5 /HPF (ref 0–4)
SODIUM SERPL-SCNC: 139 MMOL/L (ref 136–145)
SP GR UR STRIP: >=1.03 (ref 1–1.03)
SQUAMOUS #/AREA URNS HPF: 2 /HPF
URN SPEC COLLECT METH UR: ABNORMAL
UROBILINOGEN UR STRIP-ACNC: NEGATIVE EU/DL
WBC # BLD AUTO: 7.01 K/UL (ref 3.9–12.7)
WBC #/AREA URNS HPF: 1 /HPF (ref 0–5)

## 2020-05-14 PROCEDURE — 82800 BLOOD PH: CPT | Performed by: FAMILY MEDICINE

## 2020-05-14 PROCEDURE — 80053 COMPREHEN METABOLIC PANEL: CPT

## 2020-05-14 PROCEDURE — G0378 HOSPITAL OBSERVATION PER HR: HCPCS

## 2020-05-14 PROCEDURE — 96360 HYDRATION IV INFUSION INIT: CPT

## 2020-05-14 PROCEDURE — 82962 GLUCOSE BLOOD TEST: CPT

## 2020-05-14 PROCEDURE — 85025 COMPLETE CBC W/AUTO DIFF WBC: CPT

## 2020-05-14 PROCEDURE — 83036 HEMOGLOBIN GLYCOSYLATED A1C: CPT

## 2020-05-14 PROCEDURE — 96361 HYDRATE IV INFUSION ADD-ON: CPT

## 2020-05-14 PROCEDURE — 82010 KETONE BODYS QUAN: CPT

## 2020-05-14 PROCEDURE — 99285 EMERGENCY DEPT VISIT HI MDM: CPT | Mod: 25

## 2020-05-14 PROCEDURE — 25000003 PHARM REV CODE 250: Performed by: FAMILY MEDICINE

## 2020-05-14 PROCEDURE — 81000 URINALYSIS NONAUTO W/SCOPE: CPT

## 2020-05-14 RX ADMIN — SODIUM CHLORIDE 1000 ML: 0.9 INJECTION, SOLUTION INTRAVENOUS at 10:05

## 2020-05-15 VITALS
RESPIRATION RATE: 16 BRPM | HEIGHT: 64 IN | SYSTOLIC BLOOD PRESSURE: 164 MMHG | OXYGEN SATURATION: 94 % | DIASTOLIC BLOOD PRESSURE: 72 MMHG | TEMPERATURE: 99 F | WEIGHT: 219.38 LBS | BODY MASS INDEX: 37.45 KG/M2 | HEART RATE: 81 BPM

## 2020-05-15 LAB
ANION GAP SERPL CALC-SCNC: 15 MMOL/L (ref 8–16)
BUN SERPL-MCNC: 44 MG/DL (ref 6–20)
CALCIUM SERPL-MCNC: 8.9 MG/DL (ref 8.7–10.5)
CHLORIDE SERPL-SCNC: 99 MMOL/L (ref 95–110)
CO2 SERPL-SCNC: 24 MMOL/L (ref 23–29)
CREAT SERPL-MCNC: 6.2 MG/DL (ref 0.5–1.4)
EST. GFR  (AFRICAN AMERICAN): 8 ML/MIN/1.73 M^2
EST. GFR  (NON AFRICAN AMERICAN): 7 ML/MIN/1.73 M^2
ESTIMATED AVG GLUCOSE: 214 MG/DL (ref 68–131)
GLUCOSE SERPL-MCNC: 311 MG/DL (ref 70–110)
HBA1C MFR BLD HPLC: 9.1 % (ref 4–5.6)
POCT GLUCOSE: 235 MG/DL (ref 70–110)
POCT GLUCOSE: 270 MG/DL (ref 70–110)
POCT GLUCOSE: 346 MG/DL (ref 70–110)
POTASSIUM SERPL-SCNC: 3.9 MMOL/L (ref 3.5–5.1)
SARS-COV-2 RDRP RESP QL NAA+PROBE: NEGATIVE
SODIUM SERPL-SCNC: 138 MMOL/L (ref 136–145)

## 2020-05-15 PROCEDURE — 63600175 PHARM REV CODE 636 W HCPCS: Performed by: FAMILY MEDICINE

## 2020-05-15 PROCEDURE — 36415 COLL VENOUS BLD VENIPUNCTURE: CPT

## 2020-05-15 PROCEDURE — 25000003 PHARM REV CODE 250: Performed by: NURSE PRACTITIONER

## 2020-05-15 PROCEDURE — 94760 N-INVAS EAR/PLS OXIMETRY 1: CPT

## 2020-05-15 PROCEDURE — 96372 THER/PROPH/DIAG INJ SC/IM: CPT

## 2020-05-15 PROCEDURE — G0378 HOSPITAL OBSERVATION PER HR: HCPCS

## 2020-05-15 PROCEDURE — 80048 BASIC METABOLIC PNL TOTAL CA: CPT

## 2020-05-15 PROCEDURE — 99219 PR INITIAL OBSERVATION CARE,LEVL II: ICD-10-PCS | Mod: ,,, | Performed by: INTERNAL MEDICINE

## 2020-05-15 PROCEDURE — U0002 COVID-19 LAB TEST NON-CDC: HCPCS

## 2020-05-15 PROCEDURE — 99219 PR INITIAL OBSERVATION CARE,LEVL II: CPT | Mod: ,,, | Performed by: INTERNAL MEDICINE

## 2020-05-15 RX ORDER — ASPIRIN 81 MG/1
81 TABLET ORAL DAILY
Status: DISCONTINUED | OUTPATIENT
Start: 2020-05-15 | End: 2020-05-15 | Stop reason: HOSPADM

## 2020-05-15 RX ORDER — CITALOPRAM 10 MG/1
10 TABLET ORAL DAILY
Status: DISCONTINUED | OUTPATIENT
Start: 2020-05-15 | End: 2020-05-15 | Stop reason: HOSPADM

## 2020-05-15 RX ORDER — INSULIN ASPART 100 [IU]/ML
0-5 INJECTION, SOLUTION INTRAVENOUS; SUBCUTANEOUS
Status: DISCONTINUED | OUTPATIENT
Start: 2020-05-15 | End: 2020-05-15 | Stop reason: HOSPADM

## 2020-05-15 RX ORDER — FUROSEMIDE 40 MG/1
80 TABLET ORAL 2 TIMES DAILY
Status: DISCONTINUED | OUTPATIENT
Start: 2020-05-15 | End: 2020-05-15 | Stop reason: HOSPADM

## 2020-05-15 RX ORDER — RANOLAZINE 500 MG/1
1000 TABLET, EXTENDED RELEASE ORAL 2 TIMES DAILY
Status: DISCONTINUED | OUTPATIENT
Start: 2020-05-15 | End: 2020-05-15 | Stop reason: HOSPADM

## 2020-05-15 RX ORDER — DEXTROSE 50 % IN WATER (D50W) INTRAVENOUS SYRINGE
12.5
Status: DISCONTINUED | OUTPATIENT
Start: 2020-05-15 | End: 2020-05-15 | Stop reason: HOSPADM

## 2020-05-15 RX ORDER — GLUCAGON 1 MG
1 KIT INJECTION
Status: DISCONTINUED | OUTPATIENT
Start: 2020-05-15 | End: 2020-05-15 | Stop reason: HOSPADM

## 2020-05-15 RX ORDER — DEXTROSE 50 % IN WATER (D50W) INTRAVENOUS SYRINGE
25
Status: DISCONTINUED | OUTPATIENT
Start: 2020-05-15 | End: 2020-05-15 | Stop reason: HOSPADM

## 2020-05-15 RX ORDER — LEVOTHYROXINE SODIUM 75 UG/1
150 TABLET ORAL
Status: DISCONTINUED | OUTPATIENT
Start: 2020-05-16 | End: 2020-05-15 | Stop reason: HOSPADM

## 2020-05-15 RX ORDER — SODIUM CHLORIDE 0.9 % (FLUSH) 0.9 %
10 SYRINGE (ML) INJECTION
Status: DISCONTINUED | OUTPATIENT
Start: 2020-05-15 | End: 2020-05-15 | Stop reason: HOSPADM

## 2020-05-15 RX ORDER — IBUPROFEN 200 MG
24 TABLET ORAL
Status: DISCONTINUED | OUTPATIENT
Start: 2020-05-15 | End: 2020-05-15 | Stop reason: HOSPADM

## 2020-05-15 RX ORDER — IBUPROFEN 200 MG
16 TABLET ORAL
Status: DISCONTINUED | OUTPATIENT
Start: 2020-05-15 | End: 2020-05-15 | Stop reason: HOSPADM

## 2020-05-15 RX ORDER — GABAPENTIN 100 MG/1
100 CAPSULE ORAL 3 TIMES DAILY
Status: DISCONTINUED | OUTPATIENT
Start: 2020-05-15 | End: 2020-05-15 | Stop reason: HOSPADM

## 2020-05-15 RX ORDER — ISOSORBIDE MONONITRATE 30 MG/1
30 TABLET, EXTENDED RELEASE ORAL DAILY
Status: DISCONTINUED | OUTPATIENT
Start: 2020-05-15 | End: 2020-05-15 | Stop reason: HOSPADM

## 2020-05-15 RX ADMIN — APIXABAN 2.5 MG: 2.5 TABLET, FILM COATED ORAL at 11:05

## 2020-05-15 RX ADMIN — RANOLAZINE 1000 MG: 500 TABLET, FILM COATED, EXTENDED RELEASE ORAL at 11:05

## 2020-05-15 RX ADMIN — CITALOPRAM HYDROBROMIDE 10 MG: 10 TABLET ORAL at 11:05

## 2020-05-15 RX ADMIN — INSULIN ASPART 4 UNITS: 100 INJECTION, SOLUTION INTRAVENOUS; SUBCUTANEOUS at 11:05

## 2020-05-15 RX ADMIN — FUROSEMIDE 80 MG: 40 TABLET ORAL at 11:05

## 2020-05-15 RX ADMIN — ASPIRIN 81 MG: 81 TABLET, COATED ORAL at 11:05

## 2020-05-15 RX ADMIN — INSULIN ASPART 3 UNITS: 100 INJECTION, SOLUTION INTRAVENOUS; SUBCUTANEOUS at 08:05

## 2020-05-15 RX ADMIN — ISOSORBIDE MONONITRATE 30 MG: 30 TABLET, EXTENDED RELEASE ORAL at 11:05

## 2020-05-15 NOTE — DISCHARGE SUMMARY
[facility] Hospital Medicine  Discharge Summary      Patient Name: Caryn Mauricio  MRN: [id number]  Admission Date: 5/14/2020  Hospital Length of Stay: 0 days  Discharge Date and Time:  05/15/2020 11:29 AM  Attending Physician: Merced Smith MD   Discharging Provider: Carmenza Friedman NP  Primary Care Provider: Travis Newell MD      HPI:   60-year-old  female with past medical history of anxiety, CHF, end-stage renal disease on HD Tuesday Thursday Saturday, CAD, HTN, hyperlipidemia, atrial fibrillation on anticoagulation, and type 2 diabetes presented to the ER complaining of hyperglycemia x1 day.     Patient checked her sugar around 1900 and her CBG was 500.  Patient administered 10 units of NovoLog subcutaneous.    Patient  did not recheck her sugar since administering insulin because she ran out of test strips.  Patient  endorses generalized fatigue.  Patient denies fever, chills, nausea, vomiting, diarrhea, chest pain, shortness of breath, abdominal pain, changes in urinary or bowel habits.  Of note,Patient ran out of her specific type insulin that she uses for her insulin pump and has been using subcutaneous NovoLog until refills.  Patient states her sugar usually runs 150-170. Anion  GAP 18> 15.   This am . Last A1C 9.1    * No surgery found *      Hospital Course:   No notes on file     Consults:     * Hyperglycemia due to type 2 diabetes mellitus  Pt reports she has been having labile BS; up and down; had recent changes in insulin due to insurance  Notes she will be able to  test strips at    Had recent virtual visit with Dr. Dinero; f/u with endocrine  BS is stable for discharge from hospital and no DKA this admission      Paroxysmal atrial fibrillation    Continue eliquis low dose     Depression    Continue celexa     Hypothyroidism  Continue synthroid       Essential hypertension    Resume isosorbide       Final Active Diagnoses:    Diagnosis  Date Noted POA    PRINCIPAL PROBLEM:  Hyperglycemia due to type 2 diabetes mellitus [E11.65] 07/08/2019 Yes    Paroxysmal atrial fibrillation [I48.0] 11/13/2017 Yes    Essential hypertension [I10] 08/14/2014 Yes    Hypothyroidism [E03.9] 08/14/2014 Yes    Depression [F32.9] 08/14/2014 Yes      Problems Resolved During this Admission:       Discharged Condition: stable    Disposition: Home or Self Care    Follow Up:  Follow-up Information     Schedule an appointment as soon as possible for a visit with Travis Newell MD.    Specialty:  Internal Medicine  Contact information:  [address] [facility]  [employer] [place] [address]  [phone]             Schedule an appointment as soon as possible for a visit with Zaria Dinero MD.    Specialty:  Endocrinology  Contact information:  [address]  [facility] [place] [address]  [phone]                 Patient Instructions:   No discharge procedures on file.    Significant Diagnostic Studies:   A1C:        Recent Labs   Lab 02/14/20 0954 05/14/20 2202   HGBA1C 8.5* 9.1*      ABGs: No results for input(s): PH, PCO2, HCO3, POCSATURATED, BE, TOTALHB, COHB, METHB, O2HB, POCFIO2 in the last 48 hours.  Bilirubin:       Recent Labs   Lab 05/14/20 2202   BILITOT 0.6      Blood Culture: No results for input(s): LABBLOO in the last 48 hours.  CBC:       Recent Labs   Lab 05/14/20 2202   WBC 7.01   HGB 12.2   HCT 37.6         CMP:        Recent Labs   Lab 05/14/20  2202 05/15/20  0000    138   K 3.8 3.9   CL 96 99   CO2 25 24   * 311*   BUN 44* 44*   CREATININE 6.4* 6.2*   CALCIUM 9.7 8.9   PROT 8.6*  --    ALBUMIN 4.1  --    BILITOT 0.6  --    ALKPHOS 96  --    AST 16  --    ALT 23  --    ANIONGAP 18* 15   EGFRNONAA 7* 7*      Cardiac Markers: No results for input(s): CKMB, MYOGLOBIN, BNP, TROPISTAT in the last 48 hours.  Lactic Acid: No results for input(s): LACTATE in the last 48 hours.  Magnesium: No results for input(s): MG in the last 48 hours.  Troponin: No  results for input(s): TROPONINI in the last 48 hours.  TSH:       Recent Labs   Lab 02/29/20  0743   TSH 3.775      Urine Culture: No results for input(s): LABURIN in the last 48 hours.  Urine Studies:       Recent Labs   Lab 05/14/20  2202   COLORU Maria Victoria   APPEARANCEUA Hazy*   PHUR 5.0   SPECGRAV >=1.030*   PROTEINUA 3+*   GLUCUA Trace*   KETONESU Trace*   BILIRUBINUA 1+*   OCCULTUA 1+*   NITRITE Negative   UROBILINOGEN Negative   LEUKOCYTESUR Negative   RBCUA 5*   WBCUA 1   BACTERIA Few*   SQUAMEPITHEL 2   HYALINECASTS 30*      All pertinent labs within the past 24 hours have been reviewed.     Significant Imaging: I have reviewed all pertinent imaging results/findings within the past 24 hours. -- no imaging      Pending Diagnostic Studies:     None         Medications:  Reconciled Home Medications:      Medication List      CHANGE how you take these medications    METAMUCIL (SUGAR) Powd  Generic drug:  psyllium seed (sugar)  Take 1 Scoop by mouth 2 (two) times daily.  What changed:    · when to take this  · reasons to take this     triamcinolone acetonide 0.5% 0.5 % Crea  Commonly known as:  KENALOG  APPLY  CREAM TOPICALLY TWICE DAILY  What changed:  Another medication with the same name was removed. Continue taking this medication, and follow the directions you see here.        CONTINUE taking these medications    aspirin 81 MG EC tablet  Commonly known as:  ECOTRIN  Take 1 tablet (81 mg total) by mouth once daily.     blood sugar diagnostic Strp  1 strip by Misc.(Non-Drug; Combo Route) route 3 (three) times daily. True Metrix test strips     blood-glucose meter kit  Commonly known as:  TRUE METRIX AIR GLUCOSE METER  True Metrix meter kit Use as instructed     citalopram 10 MG tablet  Commonly known as:  CELEXA  Take 1 tablet (10 mg total) by mouth once daily.     ELIQUIS 2.5 mg Tab  Generic drug:  apixaban  TAKE 1 TABLET BY MOUTH TWICE DAILY     ergocalciferol 50,000 unit Cap  Commonly known as:   ERGOCALCIFEROL  Take 1 capsule (50,000 Units total) by mouth every 7 days.     furosemide 80 MG tablet  Commonly known as:  LASIX  Take 1 tablet (80 mg total) by mouth 2 (two) times daily.     gabapentin 100 MG capsule  Commonly known as:  NEURONTIN  Take 1 capsule (100 mg total) by mouth 3 (three) times daily.     HYDROcodone-acetaminophen 5-325 mg per tablet  Commonly known as:  NORCO  Take 1 tablet by mouth every 8 (eight) hours as needed for Pain.     isosorbide mononitrate 30 MG 24 hr tablet  Commonly known as:  IMDUR  Take 1 tablet (30 mg total) by mouth once daily.     lancets 33 gauge Misc  1 lancet by Misc.(Non-Drug; Combo Route) route 3 (three) times daily.     levothyroxine 150 MCG tablet  Commonly known as:  SYNTHROID  Take 1 tablet (150 mcg total) by mouth before breakfast.     linaGLIPtin 5 mg Tab tablet  Commonly known as:  TRADJENTA  Take 1 tablet (5 mg total) by mouth once daily.     miconazole 2 % vaginal cream  Commonly known as:  MICONAZOLE 7  Place 1 applicator vaginally every evening.     NEPRO ORAL  Take by mouth once daily.     nystatin cream  Commonly known as:  MYCOSTATIN  APPLY  CREAM TOPICALLY TWICE DAILY     ondansetron 4 MG Tbdl  Commonly known as:  ZOFRAN-ODT  Take 1 tablet (4 mg total) by mouth every 8 (eight) hours as needed (nausea).     pantoprazole 40 MG tablet  Commonly known as:  PROTONIX  Take 1 tablet by mouth once daily     ranolazine 1,000 mg Tb12  Commonly known as:  RANEXA  Take 1 tablet (1,000 mg total) by mouth 2 (two) times daily.            Indwelling Lines/Drains at time of discharge:   Lines/Drains/Airways     Drain                 Hemodialysis AV Fistula Left upper arm -- days                Time spent on the discharge of patient: 30 minutes  Patient was seen and examined on the date of discharge and determined to be suitable for discharge.         Carmenza Friedman NP  Department of Hospital Medicine  [facility]

## 2020-05-15 NOTE — PLAN OF CARE
Patient admitted with Dx of DKA. MD assessed patient during rounds. Patient educated on diagnosis for this admission, and patient verbalizes understanding. Patient may discharge this afternoon. CM will continue to follow patient throughout the transitions of care, and assist with any discharge needs.

## 2020-05-15 NOTE — SUBJECTIVE & OBJECTIVE
Past Medical History:   Diagnosis Date    Adjustment disorder     AI (aortic insufficiency)     mild    Anemia     Anticoagulant long-term use     coumadin    Anxiety     Asthma     CHF (congestive heart failure)     CKD (chronic kidney disease) stage 4, GFR 15-29 ml/min 8/14/2014    dialysis x 1 year    Congestion of upper airway     Coronary artery disease     non obstructive    Dependence on renal dialysis     Depression 8/14/2014    Diabetes mellitus     Diabetes mellitus type 2 in obese 5/14/2015    Dialysis patient     Dyspnea     E. coli UTI 2014    Encounter for blood transfusion     Fatigue     GERD (gastroesophageal reflux disease)     Heart failure with preserved ejection fraction 8/14/2014    History of psychiatric hospitalization     10 years ago and [facility]    Hx of psychiatric care     Celexa    Hyperlipemia     Hypertension     Hypothyroidism 8/14/2014    Neuropathy 8/14/2014    PAF (paroxysmal atrial fibrillation)     Psychiatric problem     Pt reports hx of depression    Shortness of breath     Therapy     Per pt - PCP has been prescribing Celexa    Type 2 diabetes mellitus with diabetic nephropathy 8/14/2014       Past Surgical History:   Procedure Laterality Date    ABSCESS DRAINAGE Left     groin    AV FISTULA PLACEMENT, BRACHIOCEPHALIC Left 1/11/2016    CARDIAC CATHETERIZATION  2/17/14, 7/24/09    non-obstructive CAD in RCA    COLONOSCOPY N/A 9/17/2015    Procedure: COLONOSCOPY;  Surgeon: Sorin Schneider MD;  Location: [facility];  Service: Endoscopy;  Laterality: N/A;    COLONOSCOPY N/A 3/8/2016    Procedure: COLONOSCOPY;  Surgeon: Luis Bogran-Reyes, MD;  Location: [facility];  Service: Endoscopy;  Laterality: N/A;    HYSTERECTOMY  partial; 2000    AUB/fibroids    LEFT HEART CATHETERIZATION Left 7/8/2019    Procedure: Left heart cath;  Surgeon: Charli Ferrera MD;  Location: [facility] CATH LAB;  Service: Cardiology;  Laterality: Left;     TONSILLECTOMY      TUBAL LIGATION      UPPER GASTROINTESTINAL ENDOSCOPY         Review of patient's allergies indicates:   Allergen Reactions    Iodine and iodide containing products Rash       No current facility-administered medications on file prior to encounter.      Current Outpatient Medications on File Prior to Encounter   Medication Sig    aspirin (ECOTRIN) 81 MG EC tablet Take 1 tablet (81 mg total) by mouth once daily.    blood sugar diagnostic Strp 1 strip by Misc.(Non-Drug; Combo Route) route 3 (three) times daily. True Metrix test strips    blood-glucose meter (TRUE METRIX AIR GLUCOSE METER) kit True Metrix meter kit Use as instructed    citalopram (CELEXA) 10 MG tablet Take 1 tablet (10 mg total) by mouth once daily.    ELIQUIS 2.5 mg Tab TAKE 1 TABLET BY MOUTH TWICE DAILY    ergocalciferol (ERGOCALCIFEROL) 50,000 unit Cap Take 1 capsule (50,000 Units total) by mouth every 7 days.    furosemide (LASIX) 80 MG tablet Take 1 tablet (80 mg total) by mouth 2 (two) times daily.    gabapentin (NEURONTIN) 100 MG capsule Take 1 capsule (100 mg total) by mouth 3 (three) times daily.    HYDROcodone-acetaminophen (NORCO) 5-325 mg per tablet Take 1 tablet by mouth every 8 (eight) hours as needed for Pain.    isosorbide mononitrate (IMDUR) 30 MG 24 hr tablet Take 1 tablet (30 mg total) by mouth once daily.    lancets 33 gauge Misc 1 lancet by Misc.(Non-Drug; Combo Route) route 3 (three) times daily.    levothyroxine (SYNTHROID) 150 MCG tablet Take 1 tablet (150 mcg total) by mouth before breakfast.    linaGLIPtin (TRADJENTA) 5 mg Tab tablet Take 1 tablet (5 mg total) by mouth once daily.    METAMUCIL, SUGAR, Powd Take 1 Scoop by mouth 2 (two) times daily. (Patient taking differently: Take 1 Scoop by mouth 2 (two) times daily as needed. )    miconazole (MICONAZOLE 7) 2 % vaginal cream Place 1 applicator vaginally every evening.    nut.tx.imp.renal fxn,lac-reduc (NEPRO ORAL) Take by mouth once  "daily.     nystatin (MYCOSTATIN) cream APPLY  CREAM TOPICALLY TWICE DAILY    ondansetron (ZOFRAN-ODT) 4 MG TbDL Take 1 tablet (4 mg total) by mouth every 8 (eight) hours as needed (nausea).    pantoprazole (PROTONIX) 40 MG tablet Take 1 tablet by mouth once daily    ranolazine (RANEXA) 1,000 mg Tb12 Take 1 tablet (1,000 mg total) by mouth 2 (two) times daily.    triamcinolone acetonide 0.5% (KENALOG) 0.5 % Crea APPLY  CREAM TOPICALLY TWICE DAILY    triamcinolone acetonide 0.5% (KENALOG) 0.5 % Crea APPLY  CREAM TOPICALLY TWICE DAILY    [DISCONTINUED] insulin needles, disposable, (ULTICARE) 31 gauge X 1/4 " Ndle Inject 1 Units into the skin 4 (four) times daily.     Family History     Problem Relation (Age of Onset)    Alcohol abuse Sister, Sister    Asthma Father, Sister, Paternal Uncle, Paternal Grandmother    Breast cancer Mother (65), Sister (70)    Cancer Sister    Depression Maternal Aunt    Diabetes Mother, Brother, Brother, Maternal Aunt, Maternal Uncle, Paternal Grandmother    Emphysema Father    No Known Problems Maternal Grandfather    Throat cancer Sister        Tobacco Use    Smoking status: Current Every Day Smoker     Packs/day: 0.50     Years: 35.00     Pack years: 17.50     Types: Cigarettes     Start date: 1/19/1987    Smokeless tobacco: Never Used   Substance and Sexual Activity    Alcohol use: Yes     Comment: occ    Drug use: No    Sexual activity: Yes     Partners: Male     Birth control/protection: Surgical     Comment: with one partner for 40 years     Review of Systems   Constitutional: Negative for activity change, fatigue, fever and unexpected weight change.   HENT: Negative for congestion, ear pain, hearing loss, rhinorrhea, sore throat and tinnitus.    Eyes: Negative for pain, redness and visual disturbance.   Respiratory: Negative for cough, shortness of breath and wheezing.    Cardiovascular: Negative for chest pain, palpitations and leg swelling.   Gastrointestinal: Negative " for abdominal pain, blood in stool, constipation, diarrhea, nausea and vomiting.   Genitourinary: Negative for dysuria, frequency, pelvic pain and urgency.   Musculoskeletal: Negative for back pain, joint swelling and neck pain.   Skin: Negative for color change, rash and wound.   Neurological: Negative for dizziness, tremors, weakness, light-headedness and headaches.     Objective:     Vital Signs (Most Recent):  Temp: 98.6 °F (37 °C) (05/15/20 0506)  Pulse: 74 (05/15/20 0600)  Resp: 20 (05/15/20 0506)  BP: 130/62 (05/15/20 0506)  SpO2: 95 % (05/15/20 0506) Vital Signs (24h Range):  Temp:  [97.1 °F (36.2 °C)-98.6 °F (37 °C)] 98.6 °F (37 °C)  Pulse:  [67-88] 74  Resp:  [16-20] 20  SpO2:  [95 %-99 %] 95 %  BP: (130-175)/(62-77) 130/62     Weight: 99.5 kg (219 lb 5.7 oz)  Body mass index is 37.65 kg/m².    Physical Exam   Constitutional: She is oriented to person, place, and time. She appears well-developed and well-nourished. No distress.   HENT:   Head: Normocephalic and atraumatic.   Right Ear: External ear normal.   Left Ear: External ear normal.   Eyes: Pupils are equal, round, and reactive to light. Conjunctivae and EOM are normal. Right eye exhibits no discharge. Left eye exhibits no discharge.   Neck: Neck supple. No tracheal deviation present.   Cardiovascular: Normal rate and regular rhythm.   No murmur heard.  Pulmonary/Chest: Effort normal and breath sounds normal. No respiratory distress. She has no wheezes. She has no rales.   Abdominal: Soft. Bowel sounds are normal. She exhibits no distension. There is no tenderness.   Musculoskeletal: She exhibits no edema.   Neurological: She is alert and oriented to person, place, and time. No cranial nerve deficit.   Skin: Skin is warm and dry.   Psychiatric: She has a normal mood and affect. Her behavior is normal.   Vitals reviewed.        CRANIAL NERVES     CN III, IV, VI   Pupils are equal, round, and reactive to light.  Extraocular motions are normal.         Significant Labs:   A1C:   Recent Labs   Lab 02/14/20  0954 05/14/20 2202   HGBA1C 8.5* 9.1*     ABGs: No results for input(s): PH, PCO2, HCO3, POCSATURATED, BE, TOTALHB, COHB, METHB, O2HB, POCFIO2 in the last 48 hours.  Bilirubin:   Recent Labs   Lab 05/14/20 2202   BILITOT 0.6     Blood Culture: No results for input(s): LABBLOO in the last 48 hours.  CBC:   Recent Labs   Lab 05/14/20 2202   WBC 7.01   HGB 12.2   HCT 37.6        CMP:   Recent Labs   Lab 05/14/20  2202 05/15/20  0000    138   K 3.8 3.9   CL 96 99   CO2 25 24   * 311*   BUN 44* 44*   CREATININE 6.4* 6.2*   CALCIUM 9.7 8.9   PROT 8.6*  --    ALBUMIN 4.1  --    BILITOT 0.6  --    ALKPHOS 96  --    AST 16  --    ALT 23  --    ANIONGAP 18* 15   EGFRNONAA 7* 7*     Cardiac Markers: No results for input(s): CKMB, MYOGLOBIN, BNP, TROPISTAT in the last 48 hours.  Lactic Acid: No results for input(s): LACTATE in the last 48 hours.  Magnesium: No results for input(s): MG in the last 48 hours.  Troponin: No results for input(s): TROPONINI in the last 48 hours.  TSH:   Recent Labs   Lab 02/29/20  0743   TSH 3.775     Urine Culture: No results for input(s): LABURIN in the last 48 hours.  Urine Studies:   Recent Labs   Lab 05/14/20 2202   COLORU Maria Victoria   APPEARANCEUA Hazy*   PHUR 5.0   SPECGRAV >=1.030*   PROTEINUA 3+*   GLUCUA Trace*   KETONESU Trace*   BILIRUBINUA 1+*   OCCULTUA 1+*   NITRITE Negative   UROBILINOGEN Negative   LEUKOCYTESUR Negative   RBCUA 5*   WBCUA 1   BACTERIA Few*   SQUAMEPITHEL 2   HYALINECASTS 30*     All pertinent labs within the past 24 hours have been reviewed.    Significant Imaging: I have reviewed all pertinent imaging results/findings within the past 24 hours. -- no imaging

## 2020-05-15 NOTE — PLAN OF CARE
05/15/20 1121   Final Note   Assessment Type Final Discharge Note   Anticipated Discharge Disposition Home   What phone number can be called within the next 1-3 days to see how you are doing after discharge? [phone]   Hospital Follow Up  Appt(s) scheduled? Yes   Discharge plans and expectations educations in teach back method with documentation complete? Yes   Post-Acute Status   Post-Acute Authorization Other   Other Status No Post-Acute Service Needs   Discharge Delays None known at this time       No post-acute care needs identified during this hospital stay.     Latonia Villa LMSW

## 2020-05-15 NOTE — PLAN OF CARE
05/15/20 1042   Discharge Assessment   Assessment Type Discharge Planning Assessment   Confirmed/corrected address and phone number on facesheet? Yes   Assessment information obtained from? Patient   Prior to hospitilization cognitive status: Alert/Oriented   Prior to hospitalization functional status: Independent   Current cognitive status: Alert/Oriented   Current Functional Status: Independent   Facility Arrived From: Home   Lives With grandchild(haley)  (14 year old and 16 year old granddaughters; great-grandson)   Able to Return to Prior Arrangements yes   Is patient able to care for self after discharge? Yes   Who are your caregiver(s) and their phone number(s)? Amelie (Daughter) [phone]   Patient's perception of discharge disposition home or selfcare   Readmission Within the Last 30 Days no previous admission in last 30 days   Patient currently being followed by outpatient case management? No   Patient currently receives any other outside agency services? No   Equipment Currently Used at Home glucometer   Do you have any problems affording any of your prescribed medications? No   Does the patient have transportation home? Yes   Transportation Anticipated family or friend will provide   Discharge Plan A Home   Discharge Plan B Home with family   DME Needed Upon Discharge  other (see comments)  (Test strips for glucometer)   Patient/Family in Agreement with Plan yes       Completed discharge assessment with patient over the phone. Patient denies any post-acute needs at this time except for glucometer test strips. Patient states that she has transport to [facility] to collect the strips and does not have trouble affording her diabetic supplies. SW to remain available for discharge planning needs.     Latonia Villa, YOEL

## 2020-05-15 NOTE — H&P
[facility] Medicine  History & Physical    Patient Name: Caryn Mauricio  MRN: [id number]  Admission Date: 5/14/2020  Attending Physician: Merced mSith MD   Primary Care Provider: Travis Newell MD         Patient information was obtained from patient and ER records.     Subjective:     Principal Problem:Hyperglycemia due to type 2 diabetes mellitus    Chief Complaint:   Chief Complaint   Patient presents with    Hyperglycemia        HPI: 60-year-old  female with past medical history of anxiety, CHF, end-stage renal disease on HD Tuesday Thursday Saturday, CAD, HTN, hyperlipidemia, atrial fibrillation on anticoagulation, and type 2 diabetes presented to the ER complaining of hyperglycemia x1 day.     Patient checked her sugar around 1900 and her CBG was 500.  Patient administered 10 units of NovoLog subcutaneous.    Patient  did not recheck her sugar since administering insulin because she ran out of test strips.  Patient  endorses generalized fatigue.  Patient denies fever, chills, nausea, vomiting, diarrhea, chest pain, shortness of breath, abdominal pain, changes in urinary or bowel habits.  Of note,Patient ran out of her specific type insulin that she uses for her insulin pump and has been using subcutaneous NovoLog until refills.  Patient states her sugar usually runs 150-170. Anion  GAP 18> 15.   This am . Last A1C 9.1    Past Medical History:   Diagnosis Date    Adjustment disorder     AI (aortic insufficiency)     mild    Anemia     Anticoagulant long-term use     coumadin    Anxiety     Asthma     CHF (congestive heart failure)     CKD (chronic kidney disease) stage 4, GFR 15-29 ml/min 8/14/2014    dialysis x 1 year    Congestion of upper airway     Coronary artery disease     non obstructive    Dependence on renal dialysis     Depression 8/14/2014    Diabetes mellitus     Diabetes mellitus type 2 in obese 5/14/2015    Dialysis patient      Dyspnea     E. coli UTI 2014    Encounter for blood transfusion     Fatigue     GERD (gastroesophageal reflux disease)     Heart failure with preserved ejection fraction 8/14/2014    History of [facility] hospitalization     [facility] ago [facility]    Hx of psychiatric care     Celexa    Hyperlipemia     Hypertension     Hypothyroidism 8/14/2014    Neuropathy 8/14/2014    PAF (paroxysmal atrial fibrillation)     Psychiatric problem     Pt reports hx of depression    Shortness of breath     Therapy     Per pt - PCP has been prescribing Celexa    Type 2 diabetes mellitus with diabetic nephropathy 8/14/2014       Past Surgical History:   Procedure Laterality Date    ABSCESS DRAINAGE Left     groin    AV FISTULA PLACEMENT, BRACHIOCEPHALIC Left 1/11/2016    CARDIAC CATHETERIZATION  2/17/14, 7/24/09    non-obstructive CAD in RCA    COLONOSCOPY N/A 9/17/2015    Procedure: COLONOSCOPY;  Surgeon: Sorin Schneider MD;  Location: [facility] ENDO;  Service: Endoscopy;  Laterality: N/A;    COLONOSCOPY N/A 3/8/2016    Procedure: COLONOSCOPY;  Surgeon: Luis Bogran-Reyes, MD;  Location: [facility] ENDO;  Service: Endoscopy;  Laterality: N/A;    HYSTERECTOMY  partial; 2000    AUB/fibroids    LEFT HEART CATHETERIZATION Left 7/8/2019    Procedure: Left heart cath;  Surgeon: Charli Ferrera MD;  Location: [facility] CATH LAB;  Service: Cardiology;  Laterality: Left;    TONSILLECTOMY      TUBAL LIGATION      UPPER GASTROINTESTINAL ENDOSCOPY         Review of patient's allergies indicates:   Allergen Reactions    Iodine and iodide containing products Rash       No current facility-administered medications on file prior to encounter.      Current Outpatient Medications on File Prior to Encounter   Medication Sig    aspirin (ECOTRIN) 81 MG EC tablet Take 1 tablet (81 mg total) by mouth once daily.    blood sugar diagnostic Strp 1 strip by Misc.(Non-Drug; Combo Route) route 3 (three) times daily. True Metrix test  strips    blood-glucose meter (TRUE METRIX AIR GLUCOSE METER) kit True Metrix meter kit Use as instructed    citalopram (CELEXA) 10 MG tablet Take 1 tablet (10 mg total) by mouth once daily.    ELIQUIS 2.5 mg Tab TAKE 1 TABLET BY MOUTH TWICE DAILY    ergocalciferol (ERGOCALCIFEROL) 50,000 unit Cap Take 1 capsule (50,000 Units total) by mouth every 7 days.    furosemide (LASIX) 80 MG tablet Take 1 tablet (80 mg total) by mouth 2 (two) times daily.    gabapentin (NEURONTIN) 100 MG capsule Take 1 capsule (100 mg total) by mouth 3 (three) times daily.    HYDROcodone-acetaminophen (NORCO) 5-325 mg per tablet Take 1 tablet by mouth every 8 (eight) hours as needed for Pain.    isosorbide mononitrate (IMDUR) 30 MG 24 hr tablet Take 1 tablet (30 mg total) by mouth once daily.    lancets 33 gauge Misc 1 lancet by Misc.(Non-Drug; Combo Route) route 3 (three) times daily.    levothyroxine (SYNTHROID) 150 MCG tablet Take 1 tablet (150 mcg total) by mouth before breakfast.    linaGLIPtin (TRADJENTA) 5 mg Tab tablet Take 1 tablet (5 mg total) by mouth once daily.    METAMUCIL, SUGAR, Powd Take 1 Scoop by mouth 2 (two) times daily. (Patient taking differently: Take 1 Scoop by mouth 2 (two) times daily as needed. )    miconazole (MICONAZOLE 7) 2 % vaginal cream Place 1 applicator vaginally every evening.    nut.tx.imp.renal fxn,lac-reduc (NEPRO ORAL) Take by mouth once daily.     nystatin (MYCOSTATIN) cream APPLY  CREAM TOPICALLY TWICE DAILY    ondansetron (ZOFRAN-ODT) 4 MG TbDL Take 1 tablet (4 mg total) by mouth every 8 (eight) hours as needed (nausea).    pantoprazole (PROTONIX) 40 MG tablet Take 1 tablet by mouth once daily    ranolazine (RANEXA) 1,000 mg Tb12 Take 1 tablet (1,000 mg total) by mouth 2 (two) times daily.    triamcinolone acetonide 0.5% (KENALOG) 0.5 % Crea APPLY  CREAM TOPICALLY TWICE DAILY    triamcinolone acetonide 0.5% (KENALOG) 0.5 % Crea APPLY  CREAM TOPICALLY TWICE DAILY     "[DISCONTINUED] insulin needles, disposable, (ULTICARE) 31 gauge X 1/4 " Ndle Inject 1 Units into the skin 4 (four) times daily.     Family History     Problem Relation (Age of Onset)    Alcohol abuse Sister, Sister    Asthma Father, Sister, Paternal Uncle, Paternal Grandmother    Breast cancer Mother (65), Sister (70)    Cancer Sister    Depression Maternal Aunt    Diabetes Mother, Brother, Brother, Maternal Aunt, Maternal Uncle, Paternal Grandmother    Emphysema Father    No Known Problems Maternal Grandfather    Throat cancer Sister        Tobacco Use    Smoking status: Current Every Day Smoker     Packs/day: 0.50     Years: 35.00     Pack years: 17.50     Types: Cigarettes     Start date: 1/19/1987    Smokeless tobacco: Never Used   Substance and Sexual Activity    Alcohol use: Yes     Comment: occ    Drug use: No    Sexual activity: Yes     Partners: Male     Birth control/protection: Surgical     Comment: with one partner for 40 years     Review of Systems   Constitutional: Negative for activity change, fatigue, fever and unexpected weight change.   HENT: Negative for congestion, ear pain, hearing loss, rhinorrhea, sore throat and tinnitus.    Eyes: Negative for pain, redness and visual disturbance.   Respiratory: Negative for cough, shortness of breath and wheezing.    Cardiovascular: Negative for chest pain, palpitations and leg swelling.   Gastrointestinal: Negative for abdominal pain, blood in stool, constipation, diarrhea, nausea and vomiting.   Genitourinary: Negative for dysuria, frequency, pelvic pain and urgency.   Musculoskeletal: Negative for back pain, joint swelling and neck pain.   Skin: Negative for color change, rash and wound.   Neurological: Negative for dizziness, tremors, weakness, light-headedness and headaches.     Objective:     Vital Signs (Most Recent):  Temp: 98.6 °F (37 °C) (05/15/20 0506)  Pulse: 74 (05/15/20 0600)  Resp: 20 (05/15/20 0506)  BP: 130/62 (05/15/20 0506)  SpO2: 95 " % (05/15/20 0506) Vital Signs (24h Range):  Temp:  [97.1 °F (36.2 °C)-98.6 °F (37 °C)] 98.6 °F (37 °C)  Pulse:  [67-88] 74  Resp:  [16-20] 20  SpO2:  [95 %-99 %] 95 %  BP: (130-175)/(62-77) 130/62     Weight: 99.5 kg (219 lb 5.7 oz)  Body mass index is 37.65 kg/m².    Physical Exam   Constitutional: She is oriented to person, place, and time. She appears well-developed and well-nourished. No distress.   HENT:   Head: Normocephalic and atraumatic.   Right Ear: External ear normal.   Left Ear: External ear normal.   Eyes: Pupils are equal, round, and reactive to light. Conjunctivae and EOM are normal. Right eye exhibits no discharge. Left eye exhibits no discharge.   Neck: Neck supple. No tracheal deviation present.   Cardiovascular: Normal rate and regular rhythm.   No murmur heard.  Pulmonary/Chest: Effort normal and breath sounds normal. No respiratory distress. She has no wheezes. She has no rales.   Abdominal: Soft. Bowel sounds are normal. She exhibits no distension. There is no tenderness.   Musculoskeletal: She exhibits no edema.   Neurological: She is alert and oriented to person, place, and time. No cranial nerve deficit.   Skin: Skin is warm and dry.   Psychiatric: She has a normal mood and affect. Her behavior is normal.   Vitals reviewed.        CRANIAL NERVES     CN III, IV, VI   Pupils are equal, round, and reactive to light.  Extraocular motions are normal.        Significant Labs:   A1C:   Recent Labs   Lab 02/14/20  0954 05/14/20 2202   HGBA1C 8.5* 9.1*     ABGs: No results for input(s): PH, PCO2, HCO3, POCSATURATED, BE, TOTALHB, COHB, METHB, O2HB, POCFIO2 in the last 48 hours.  Bilirubin:   Recent Labs   Lab 05/14/20 2202   BILITOT 0.6     Blood Culture: No results for input(s): LABBLOO in the last 48 hours.  CBC:   Recent Labs   Lab 05/14/20 2202   WBC 7.01   HGB 12.2   HCT 37.6        CMP:   Recent Labs   Lab 05/14/20 2202 05/15/20  0000    138   K 3.8 3.9   CL 96 99   CO2 25 24    * 311*   BUN 44* 44*   CREATININE 6.4* 6.2*   CALCIUM 9.7 8.9   PROT 8.6*  --    ALBUMIN 4.1  --    BILITOT 0.6  --    ALKPHOS 96  --    AST 16  --    ALT 23  --    ANIONGAP 18* 15   EGFRNONAA 7* 7*     Cardiac Markers: No results for input(s): CKMB, MYOGLOBIN, BNP, TROPISTAT in the last 48 hours.  Lactic Acid: No results for input(s): LACTATE in the last 48 hours.  Magnesium: No results for input(s): MG in the last 48 hours.  Troponin: No results for input(s): TROPONINI in the last 48 hours.  TSH:   Recent Labs   Lab 02/29/20  0743   TSH 3.775     Urine Culture: No results for input(s): LABURIN in the last 48 hours.  Urine Studies:   Recent Labs   Lab 05/14/20  2202   COLORU Maria Victoria   APPEARANCEUA Hazy*   PHUR 5.0   SPECGRAV >=1.030*   PROTEINUA 3+*   GLUCUA Trace*   KETONESU Trace*   BILIRUBINUA 1+*   OCCULTUA 1+*   NITRITE Negative   UROBILINOGEN Negative   LEUKOCYTESUR Negative   RBCUA 5*   WBCUA 1   BACTERIA Few*   SQUAMEPITHEL 2   HYALINECASTS 30*     All pertinent labs within the past 24 hours have been reviewed.    Significant Imaging: I have reviewed all pertinent imaging results/findings within the past 24 hours. -- no imaging    Assessment/Plan:     * Hyperglycemia due to type 2 diabetes mellitus  Pt reports she has been having labile BS; up and down; had recent changes in insulin due to insurance  Notes she will be able to  test strips at    Had recent virtual visit with Dr. Dinero; f/u with endocrine  BS is stable for discharge from hospital and no DKA this admission      Diabetic ketoacidosis without coma associated with type 2 diabetes mellitus  Pt reports she has been having labile BS; up and down; had recent changes in insulin due to insurance  Notes she will be able to  test strips at    Had recent virtual visit with Dr. Dinero; f/u with endocrine     Paroxysmal atrial fibrillation    Continue eliquis low dose     Depression    Continue celexa     Hypothyroidism  Continue  synthroid       Essential hypertension    Resume isosorbide       VTE Risk Mitigation (From admission, onward)         Ordered     apixaban tablet 2.5 mg  2 times daily      05/15/20 0953     IP VTE HIGH RISK PATIENT  Once      05/15/20 0058     Place sequential compression device  Until discontinued      05/15/20 0058                   Chelle Draper MD  Department of Hospital Medicine   [facility]

## 2020-05-15 NOTE — ASSESSMENT & PLAN NOTE
Pt reports she has been having labile BS; up and down; had recent changes in insulin due to insurance  Notes she will be able to  test strips at    Had recent virtual visit with Dr. Dinero; f/u with endocrine  BS is stable for discharge from hospital and no DKA this admission

## 2020-05-15 NOTE — PLAN OF CARE
05/15/20 1045   MARLOW Message   Medicare Outpatient and Observation Notification regarding financial responsibility Given to patient/caregiver;Explained to patient/caregiver;Signed/date by patient/caregiver   Date MARLOW was signed 05/15/20   Time MARLOW was signed 1011       Completed with patient over the phone due to COVID 19 precautions.     Latonia Villa LMSW

## 2020-05-15 NOTE — PLAN OF CARE
Assessment complete per flow sheet, AAOX4, RR even unlabored BBS clear, on RA. Cardiac monitor in use, NSR noted. Abdomen soft, non distended, with active bowel sounds x 4 quads. Tolerating diet well, blood sugar monitoring AC& HS with S/S coverage as needed. PIV  SL  Dsg C/D/I.  Medications administered per MAR, tolerated well. Safety precautions maintained, refusing bed alarm, free from falls/ injury, call bell in reach, instructed to call for needs, voiced understanding, will monitor.       MD rounding, new orders noted.  PIV removed, tolerated well. Discharge instructions provided, voiced understanding,   Wheeled down to car via staff, discharged to home.

## 2020-05-15 NOTE — ED TRIAGE NOTES
Patient stated that her blood sugar was over 500 today and she took insulin. Stated she ran out of test strips and wants her sugar checked because she does not feel good.

## 2020-05-15 NOTE — ED PROVIDER NOTES
Encounter Date: 5/14/2020       History     Chief Complaint   Patient presents with    Hyperglycemia     60-year-old  female presents to the ER complaining of hyperglycemia x1 day.  Patient has significant past medical history for anxiety, CHF, end-stage renal disease on HD Tuesday Thursday Saturday, CAD, hypertension, hyperlipidemia, atrial fibrillation on anticoagulation, and type 2 diabetes.  Patient checked her sugar around 1900 and her CBG was 500.  Patient administered 10 units of NovoLog subcutaneous.    Patient is not recheck her sugar since administering insulin because she ran out of test strips.  Patient also admits to feeling generalized fatigue.  Patient denies fever, chills, nausea, vomiting, diarrhea, chest pain, shortness of breath, abdominal pain, changes in urinary or bowel habits.  Of note,Patient ran out of her specific type insulin that she uses for her insulin pump and has been using subcutaneous NovoLog until refills.  Patient states her sugar usually runs 150-170.        Review of patient's allergies indicates:   Allergen Reactions    Iodine and iodide containing products Rash     Past Medical History:   Diagnosis Date    Adjustment disorder     AI (aortic insufficiency)     mild    Anemia     Anticoagulant long-term use     coumadin    Anxiety     Asthma     CHF (congestive heart failure)     CKD (chronic kidney disease) stage 4, GFR 15-29 ml/min 8/14/2014    dialysis x 1 year    Congestion of upper airway     Coronary artery disease     non obstructive    Dependence on renal dialysis     Depression 8/14/2014    Diabetes mellitus     Diabetes mellitus type 2 in obese 5/14/2015    Dialysis patient     Dyspnea     E. coli UTI 2014    Encounter for blood transfusion     Fatigue     GERD (gastroesophageal reflux disease)     Heart failure with preserved ejection fraction 8/14/2014    [facility] of [facility]     [facility]    Hx of psychiatric care     Celexa    Hyperlipemia     Hypertension     Hypothyroidism 8/14/2014    Neuropathy 8/14/2014    PAF (paroxysmal atrial fibrillation)     Psychiatric problem     Pt reports hx of depression    Shortness of breath     Therapy     Per pt - PCP has been prescribing Celexa    Type 2 diabetes mellitus with diabetic nephropathy 8/14/2014     Past Surgical History:   Procedure Laterality Date    ABSCESS DRAINAGE Left     groin    AV FISTULA PLACEMENT, BRACHIOCEPHALIC Left 1/11/2016    CARDIAC CATHETERIZATION  2/17/14, 7/24/09    non-obstructive CAD in RCA    COLONOSCOPY N/A 9/17/2015    Procedure: COLONOSCOPY;  Surgeon: Sorin Schneider MD;  Location: [facility] ENDO;  Service: Endoscopy;  Laterality: N/A;    COLONOSCOPY N/A 3/8/2016    Procedure: COLONOSCOPY;  Surgeon: Luis Bogran-Reyes, MD;  Location: [facility] ENDO;  Service: Endoscopy;  Laterality: N/A;    HYSTERECTOMY  partial; 2000    AUB/fibroids    LEFT HEART CATHETERIZATION Left 7/8/2019    Procedure: Left heart cath;  Surgeon: Charli Ferrera MD;  Location: [facility] CATH LAB;  Service: Cardiology;  Laterality: Left;    TONSILLECTOMY      TUBAL LIGATION      UPPER GASTROINTESTINAL ENDOSCOPY       Family History   Problem Relation Age of Onset    Diabetes Mother     Breast cancer Mother 65        one breast removed    Asthma Father     Emphysema Father     Alcohol abuse Sister     Diabetes Brother     Asthma Sister     Breast cancer Sister 70    Cancer Sister     Throat cancer Sister     Alcohol abuse Sister     Diabetes Brother     Depression Maternal Aunt     Diabetes Maternal Aunt     Diabetes Maternal Uncle     Asthma Paternal Uncle     No Known Problems Maternal Grandfather     Asthma Paternal Grandmother     Diabetes Paternal Grandmother     Colon cancer Neg Hx      Social History     Tobacco Use    Smoking status: Current Every Day Smoker     Packs/day: 0.50     Years: 35.00     Pack years: 17.50      Types: Cigarettes     Start date: 1/19/1987    Smokeless tobacco: Never Used   Substance Use Topics    Alcohol use: Yes     Comment: occ    Drug use: No     Review of Systems   Constitutional: Positive for fatigue. Negative for chills and fever.   HENT: Negative for ear pain, sinus pressure, sinus pain and sore throat.    Eyes: Negative for discharge and visual disturbance.   Respiratory: Negative for cough, chest tightness, shortness of breath and wheezing.    Cardiovascular: Negative for chest pain and palpitations.   Gastrointestinal: Negative for abdominal pain, diarrhea, nausea and vomiting.   Endocrine:        Hyperglycemia   Genitourinary: Negative for difficulty urinating, dysuria, flank pain, frequency and urgency.   Musculoskeletal: Negative for back pain.   Skin: Negative for rash.   Neurological: Negative for dizziness, syncope, weakness, light-headedness, numbness and headaches.       Physical Exam     Initial Vitals [05/14/20 2132]   BP Pulse Resp Temp SpO2   (!) 175/77 73 18 97.1 °F (36.2 °C) 96 %      MAP       --         Physical Exam    Nursing note and vitals reviewed.  Constitutional: She appears well-developed and well-nourished.   Patient ambulated to ED bed without limitations or assistance.  Patient is speaking in full sentences without distress.  Patient does not appear to be in pain or ill.   HENT:   Head: Normocephalic and atraumatic.   Right Ear: External ear normal.   Left Ear: External ear normal.   Nose: Nose normal.   Mouth/Throat: Oropharynx is clear and moist. No oropharyngeal exudate or tonsillar abscesses.   Eyes: Conjunctivae and EOM are normal. Right eye exhibits no discharge. Left eye exhibits no discharge.   Neck: Neck supple.   Cardiovascular: Normal rate, regular rhythm, normal heart sounds and intact distal pulses.   No murmur heard.  Pulmonary/Chest: Breath sounds normal. No respiratory distress. She has no wheezes. She has no rhonchi. She has no rales.   Abdominal:  Soft. Bowel sounds are normal. She exhibits no distension. There is no tenderness. There is no rebound and no guarding.   Musculoskeletal: Normal range of motion.   Left upper extremity - AV fistula with palpable thrill and audible bruit.   Neurological: She is alert and oriented to person, place, and time. She has normal strength and normal reflexes. No cranial nerve deficit or sensory deficit. GCS score is 15. GCS eye subscore is 4. GCS verbal subscore is 5. GCS motor subscore is 6.   Skin: Skin is warm. Capillary refill takes less than 2 seconds. No rash noted.   Psychiatric: She has a normal mood and affect.         ED Course   Procedures  Labs Reviewed   COMPREHENSIVE METABOLIC PANEL - Abnormal; Notable for the following components:       Result Value    Glucose 354 (*)     BUN, Bld 44 (*)     Creatinine 6.4 (*)     Total Protein 8.6 (*)     Anion Gap 18 (*)     eGFR if  8 (*)     eGFR if non  7 (*)     All other components within normal limits   URINALYSIS, REFLEX TO URINE CULTURE - Abnormal; Notable for the following components:    Appearance, UA Hazy (*)     Specific Gravity, UA >=1.030 (*)     Protein, UA 3+ (*)     Glucose, UA Trace (*)     Ketones, UA Trace (*)     Bilirubin (UA) 1+ (*)     Occult Blood UA 1+ (*)     All other components within normal limits    Narrative:     Preferred Collection Type->Urine, Clean Catch   URINALYSIS MICROSCOPIC - Abnormal; Notable for the following components:    RBC, UA 5 (*)     Bacteria Few (*)     Hyaline Casts, UA 30 (*)     All other components within normal limits    Narrative:     Preferred Collection Type->Urine, Clean Catch   POCT GLUCOSE - Abnormal; Notable for the following components:    POCT Glucose 323 (*)     All other components within normal limits   POCT GLUCOSE - Abnormal; Notable for the following components:    POCT Glucose 301 (*)     All other components within normal limits   CBC W/ AUTO DIFFERENTIAL   BETA -  HYDROXYBUTYRATE, SERUM   BASIC METABOLIC PANEL   POCT GLUCOSE MONITORING CONTINUOUS          Imaging Results    None                            ED Course as of May 14 2358   Thu May 14, 2020   2206 Upon review of patient's chart her glucose level usually runs around 250.    [LG]   2348 Dr. Smith accepts admission to the hospital observation.  Will admit patient to the floor and place on sliding scale insulin.  Will obtain a repeat BMP.    [LG]      ED Course User Index  [LG] Nael Calero III, MD                Clinical Impression:       ICD-10-CM ICD-9-CM   1. Diabetic ketoacidosis without coma associated with type 2 diabetes mellitus E11.10 250.12             ED Disposition Condition    Observation                           Nael Calero III, MD  05/14/20 0923

## 2020-05-15 NOTE — ASSESSMENT & PLAN NOTE
Pt reports she has been having labile BS; up and down; had recent changes in insulin due to insurance  Notes she will be able to  test strips at    Had recent virtual visit with Dr. Dinero; f/u with endocrine

## 2020-05-15 NOTE — HPI
60-year-old  female with past medical history of anxiety, CHF, end-stage renal disease on HD Tuesday Thursday Saturday, CAD, HTN, hyperlipidemia, atrial fibrillation on anticoagulation, and type 2 diabetes presented to the ER complaining of hyperglycemia x1 day.     Patient checked her sugar around 1900 and her CBG was 500.  Patient administered 10 units of NovoLog subcutaneous.    Patient did not recheck her sugar since administering insulin because she ran out of test strips.  Patient endorses generalized fatigue.  Patient denies fever, chills, nausea, vomiting, diarrhea, chest pain, shortness of breath, abdominal pain, changes in urinary or bowel habits.  Of note,Patient ran out of her specific type insulin that she uses for her insulin pump and has been using subcutaneous NovoLog until refills.  Patient states her sugar usually runs 150-170. Anion  GAP 18> 15.   This am . Last A1C 9.1

## 2020-05-28 PROBLEM — G93.40 ACUTE ENCEPHALOPATHY: Status: ACTIVE | Noted: 2020-05-28

## 2020-05-28 PROBLEM — R41.0 DELIRIUM: Status: ACTIVE | Noted: 2020-05-28

## 2020-08-06 NOTE — ASSESSMENT & PLAN NOTE
- Ms. Caryn Mauricio presented to [facility] with chest pain   - EKG without ischemia, troponins flat  - Coronary angiography: Mild nonobstructive CAD 7/2019  - cardio recs for stress/ECHO  · The patient's exercise capacity was severely impaired. There was no chest pain.  · Sensitivity is impaired due to the patient's failure to achieve target heart rate.  · The ECG portion of this study is negative for myocardial ischemia.  · The stress echo portion of this study is negative for myocardial ischemia.  · Normal left ventricular systolic function. The estimated ejection fraction is 65%.  · No wall motion abnormalities.  · Normal right ventricular systolic function.

## 2020-08-06 NOTE — SUBJECTIVE & OBJECTIVE
Interval History: seen on HD, sleepy, arousable; rec'd morphine at outside ED    Review of Systems   Constitutional: Negative for fever.   Respiratory: Negative for cough, shortness of breath and wheezing.    Cardiovascular: Positive for chest pain. Negative for palpitations.   Gastrointestinal: Negative for abdominal pain, constipation, diarrhea, nausea and vomiting.   Genitourinary: Negative for dysuria and hematuria.   Musculoskeletal: Negative for arthralgias and myalgias.   Neurological: Negative for dizziness, weakness, light-headedness and headaches.     Objective:     Vital Signs (Most Recent):  Temp: 97.4 °F (36.3 °C) (08/06/20 0745)  Pulse: (OFF TELE ) (08/06/20 1200)  Resp: 18 (08/06/20 0745)  BP: (!) 114/55 (08/06/20 0745)  SpO2: 98 % (08/06/20 1019) Vital Signs (24h Range):  Temp:  [97 °F (36.1 °C)-99.4 °F (37.4 °C)] 97.4 °F (36.3 °C)  Pulse:  [56-90] 57  Resp:  [0-25] 18  SpO2:  [89 %-100 %] 98 %  BP: (114-203)/(49-88) 156/49     Weight: 102.5 kg (225 lb 15.5 oz)  Body mass index is 36.47 kg/m².  No intake or output data in the 24 hours ending 08/06/20 1559   Physical Exam  Vitals signs and nursing note reviewed.   Constitutional:       General: She is not in acute distress.     Appearance: She is well-developed. She is obese. She is not diaphoretic.   HENT:      Head: Normocephalic and atraumatic.   Eyes:      General: No scleral icterus.  Neck:      Musculoskeletal: Normal range of motion and neck supple.      Trachea: No tracheal deviation.   Cardiovascular:      Rate and Rhythm: Normal rate and regular rhythm.   Pulmonary:      Effort: Pulmonary effort is normal.      Comments: occ rhonchi  Abdominal:      General: Bowel sounds are normal. There is no distension.      Palpations: Abdomen is soft.      Tenderness: There is no abdominal tenderness. There is no guarding.   Skin:     General: Skin is warm and dry.   Neurological:      Mental Status: She is alert and oriented to person, place, and time.       Cranial Nerves: No cranial nerve deficit.      Motor: No abnormal muscle tone.      Comments: Sleepy but arousable         Significant Labs:   CBC:   Recent Labs   Lab 08/05/20 2047 08/06/20 0414   WBC 6.48 7.01   HGB 11.2* 10.7*   HCT 34.9* 34.2*    181     CMP:   Recent Labs   Lab 08/05/20 2047 08/06/20 0414    138   K 4.1 4.3   CL 97 100   CO2 30* 26   * 182*   BUN 39* 43*   CREATININE 5.7* 5.5*   CALCIUM 10.2 9.4   PROT 8.1  --    ALBUMIN 4.2  --    BILITOT 0.7  --    ALKPHOS 66  --    AST 20  --    ALT 26  --    ANIONGAP 13 12   EGFRNONAA 8* 8*     Troponin:   Recent Labs   Lab 08/05/20 2047 08/06/20 0414 08/06/20  0849   TROPONINI 0.053* 0.033* 0.030*     All pertinent labs within the past 24 hours have been reviewed.    Significant Imaging:   EXAMINATION:  XR CHEST 1 VIEW     CLINICAL HISTORY:  CP;  chest pain.     COMPARISON:  06/23/2020     FINDINGS:  Increased interstitial markings bilaterally with mild linear scarring of the mid lower lung zones.  Cardiac silhouette mildly enlarged with prominence pulmonary vasculature suggesting pulmonary vascular congestion.  No significant change since the prior exam dated 06/23/2020.No suspicious bony abnormality..     Impression:     1. Cardiomegaly with evidence of mild pulmonary vascular congestion.  2. Increased interstitial markings bilaterally with mild linear scarring.  No change since the previous exam.        Electronically signed by: Ronny Preston  Date:                                            08/05/2020  Time:                                           21:46

## 2020-08-06 NOTE — ED PROVIDER NOTES
[facility]                                                 Chief Complaint  60 y.o. female with Chest Pain   -- left sided CP X 2 hours  -- pt states she is having a heaviness in her chest  -- nausea and cold sweat    History of Present Illness  Caryn Mauricio presents to the emergency room with chest pain tonight  Patient with left-sided chest wall pain for last 2 hours, CAD history noted now  Patient with normal sinus rhythm on EKG without ST changes noted in the ER  Patient with significant chest pain, 10/10, elephant on the chest per description  Patient has a known history of coronary artery disease, hemodialysis patient    The history is provided by the patient   device was not used during this ER visit    Past Medical History   -- AI (aortic insufficiency)      -- Anemia      -- Anticoagulant long-term use      -- Asthma      -- CKD (chronic kidney disease) stage 4, GFR 15-29 ml/min      -- Congestion of upper airway      -- Coronary artery disease      -- Depression      -- Diabetes mellitus      -- Diabetes mellitus type 2 in obese      -- Dyspnea      -- E. coli UTI      -- Encounter for blood transfusion      -- Heart failure with preserved ejection fraction      -- Hyperlipemia      -- Hypertension      -- Hypothyroidism      -- Neuropathy      -- PAF (paroxysmal atrial fibrillation)      -- Shortness of breath      -- Type 2 diabetes mellitus with diabetic nephropathy          Past Surgical History   -- Tubal ligation         -- Hysterectomy         -- Tonsillectomy         -- Abscess drainage         -- Colonoscopy         -- Cardiac catheterization         -- Av fistula placement, brachiocephalic         -- Colonoscopy         -- Upper gastrointestinal endoscopy             Allergies   -- Iodine And Iodide Containing Products      I have reviewed all of this patient's past medical, surgical, family, and social   histories as well as active allergies and  medications documented in the  electronic medical record    Review of Systems and Physical Exam      Review of Systems  -- Constitution - no fever, denies fatigue, no weakness, no chills  -- Eyes - no tearing or redness, no visual disturbance  -- Ear, Nose - no tinnitus or earache, no nasal congestion or discharge  -- Mouth,Throat - no sore throat, no toothache, normal voice, normal swallowing  -- Respiratory - denies cough and congestion, no shortness of breath, no MOSQUEDA  -- Cardiovascular - denies chest pain, no palpitations, denies claudication  -- Gastrointestinal - denies abdominal pain, nausea, vomiting, or diarrhea  -- Genitourinary - no dysuria, denies flank pain, no hematuria, no STD risk  -- Musculoskeletal - denies back pain, negative for trauma or injury  -- Neurological - no headache, denies weakness or seizure; no LOC  -- Skin - denies pallor, rash, or changes in skin. no hives or welts noted    Vital Signs  Her oral temperature is 99.4 °F (37.4 °C).   Her blood pressure is 197/77 and her pulse is 76.   Her respiration is 18 and oxygen saturation is 95%.     Physical Exam  -- Nursing note and vitals reviewed  -- Constitutional: Appears well-developed and well-nourished  -- Head: Atraumatic. Normocephalic. No obvious abnormality  -- Eyes: Pupils are equal and reactive to light. Normal conjunctiva and lids  -- Cardiac: Normal rate, regular rhythm and normal heart sounds  -- Pulmonary: Normal respiratory effort, breath sounds clear to auscultation  -- Abdominal: Soft, no tenderness. Normal bowel sounds. Normal liver edge  -- Musculoskeletal: Normal range of motion, no effusions. Joints stable   -- Neurological: No focal deficits. Showed good interaction with staff  -- Vascular: Posterior tibial, dorsalis pedis and radial pulses 2+ bilaterally      Emergency Room Course      Lab Results     K 4.1   CL 97   CO2 30 (H)   BUN 39 (H)   CREATININE 5.7 (H)    (H)   ALKPHOS 66   AST 20   ALT 26    BILITOT 0.7   ALBUMIN 4.2   PROT 8.1   WBC 6.48   HGB 11.2 (L)   HCT 34.9 (L)            CPKMB 3.0   TROPONINI 0.053 (H)   INR 1.0    (H)   MG 1.8     EKG  -- The EKG findings today were without concerning findings from baseline     Radiology  -- Chest x-ray showed no infiltrate and showed no acute pathology     Medications Given  nitroGLYCERIN 2% TD oint ointment 1 inch (has no administration in time range)   pantoprazole EC tablet 40 mg (has no administration in time range)   atorvastatin tablet 40 mg (has no administration in time range)   morphine injection 2 mg (has no administration in time range)   ondansetron injection 4 mg (has no administration in time range)   morphine injection 2 mg (has no administration in time range)   ondansetron injection 4 mg (has no administration in time range)   aspirin tablet 325 mg (325 mg Oral Given 8/5/20 2053)     Heart Pathway Score  -- History: Slightly suspicious (1)  -- EKG: Slightly abnormal (1)  -- Age:  55-65 (1)  -- Risk factors: No known risk factors (3)  -- Initial troponin: Abnormal (1)  -- Total score: (7) moderate to high risk    ED Physician Management  -- Diagnosis management comments: 60 y.o. female with chest pain  -- patient with chest pain with significant sweating, elevated troponin  -- patient with high blood pressure, multiple comorbidities noted  -- chest pain better with nitroglycerin and morphine, not resolved  -- patient to be transferred to cardiology service, dialysis patient    Diagnosis  [R07.9] Chest pain  [I20.0] Unstable angina (Primary)    Disposition and Plan  -- Disposition: transfer  -- Condition: stable    This note is dictated on M*Modal word recognition program.  There are word recognition mistakes that are occasionally missed on review.         Sunny Reed MD  08/05/20 [id number]

## 2020-08-06 NOTE — ASSESSMENT & PLAN NOTE
- \[facility] [place] [facility]\""  - [facility]   - Dr. Chilo Sommer  - [facility] Nephro consulted for HD

## 2020-08-06 NOTE — ASSESSMENT & PLAN NOTE
- last A1C:   Lab Results   Component Value Date    HGBA1C 9.2 (H) 06/22/2020    - A1C pending for AM   - Diabetic diet   - patient wears Vgo at home, unsure of basal dose    - weight based basal insulin ordered   - SSI with accuchekcs [id number] ordered

## 2020-08-06 NOTE — SUBJECTIVE & OBJECTIVE
Past Medical History:   Diagnosis Date    Adjustment disorder     AI (aortic insufficiency)     mild    Anemia     Anticoagulant long-term use     coumadin    Anxiety     Asthma     CHF (congestive heart failure)     CKD (chronic kidney disease) stage 4, GFR 15-29 ml/min 8/14/2014    dialysis x 1 year    Congestion of upper airway     Coronary artery disease     non obstructive    Dependence on renal dialysis     Depression 8/14/2014    Diabetes mellitus     Diabetes mellitus type 2 in obese 5/14/2015    Dialysis patient     Dyspnea     E. coli UTI 2014    Encounter for blood transfusion     Fatigue     GERD (gastroesophageal reflux disease)     Heart failure with preserved ejection fraction 8/14/2014    History of psychiatric hospitalization     10 years ago and [facility]    Hx of psychiatric care     Celexa    Hyperlipemia     Hypertension     Hypothyroidism 8/14/2014    Neuropathy 8/14/2014    PAF (paroxysmal atrial fibrillation)     Psychiatric problem     Pt reports hx of depression    Shortness of breath     Therapy     Per pt - PCP has been prescribing Celexa    Type 2 diabetes mellitus with diabetic nephropathy 8/14/2014       Past Surgical History:   Procedure Laterality Date    ABSCESS DRAINAGE Left     groin    AV FISTULA PLACEMENT, BRACHIOCEPHALIC Left 1/11/2016    CARDIAC CATHETERIZATION  2/17/14, 7/24/09    non-obstructive CAD in RCA    COLONOSCOPY N/A 9/17/2015    Procedure: COLONOSCOPY;  Surgeon: Sorin Schneider MD;  Location: [facility];  Service: Endoscopy;  Laterality: N/A;    COLONOSCOPY N/A 3/8/2016    Procedure: COLONOSCOPY;  Surgeon: Luis Bogran-Reyes, MD;  Location: [facility];  Service: Endoscopy;  Laterality: N/A;    HYSTERECTOMY  partial; 2000    AUB/fibroids    LEFT HEART CATHETERIZATION Left 7/8/2019    Procedure: Left heart cath;  Surgeon: Charli Ferrera MD;  Location[facility] C[facility] LAB;  Service: Cardiology;  Laterality: Left;     TONSILLECTOMY      TUBAL LIGATION      UPPER GASTROINTESTINAL ENDOSCOPY         Review of patient's allergies indicates:   Allergen Reactions    Iodine and iodide containing products Rash       Current Facility-Administered Medications on File Prior to Encounter   Medication    [COMPLETED] aspirin tablet 325 mg    [COMPLETED] atorvastatin tablet 40 mg    [COMPLETED] metoprolol tartrate (LOPRESSOR) tablet 25 mg    [COMPLETED] morphine injection 2 mg    [COMPLETED] nitroGLYCERIN 2% TD oint ointment 1 inch    [COMPLETED] ondansetron injection 4 mg    [COMPLETED] pantoprazole EC tablet 40 mg    [DISCONTINUED] morphine injection 2 mg    [DISCONTINUED] ondansetron injection 4 mg     Current Outpatient Medications on File Prior to Encounter   Medication Sig    aspirin (ECOTRIN) 81 MG EC tablet Take 1 tablet (81 mg total) by mouth once daily.    blood sugar diagnostic Strp 1 strip by Misc.(Non-Drug; Combo Route) route 3 (three) times daily. True Metrix test strips    blood-glucose meter (TRUE METRIX AIR GLUCOSE METER) kit True Metrix meter kit Use as instructed    citalopram (CELEXA) 10 MG tablet Take 1 tablet (10 mg total) by mouth once daily.    clotrimazole-betamethasone 1-0.05% (LOTRISONE) cream Apply topically 2 (two) times daily. for 7 days    ELIQUIS 2.5 mg Tab TAKE 1 TABLET BY MOUTH TWICE DAILY    ergocalciferol (ERGOCALCIFEROL) 50,000 unit Cap Take 1 capsule (50,000 Units total) by mouth every 7 days.    flash glucose sensor (FREESTYLE KIRILL 14 DAY SENSOR) Kit 1 application by Misc.(Non-Drug; Combo Route) route every 14 (fourteen) days.    furosemide (LASIX) 80 MG tablet Take 1 tablet (80 mg total) by mouth 2 (two) times daily.    gabapentin (NEURONTIN) 100 MG capsule Take 1 capsule (100 mg total) by mouth 3 (three) times daily.    HYDROcodone-acetaminophen (NORCO) 5-325 mg per tablet Take 1 tablet by mouth every 8 (eight) hours as needed for Pain.    insulin lispro (HUMALOG U-100 INSULIN)  "100 unit/mL injection Inject 66 Units into the skin continuous.    isosorbide mononitrate (IMDUR) 30 MG 24 hr tablet Take 1 tablet (30 mg total) by mouth once daily.    lancets 33 gauge Misc 1 lancet by Misc.(Non-Drug; Combo Route) route 3 (three) times daily.    levothyroxine (SYNTHROID) 150 MCG tablet Take 1 tablet (150 mcg total) by mouth before breakfast.    linaGLIPtin (TRADJENTA) 5 mg Tab tablet Take 1 tablet (5 mg total) by mouth once daily.    lisinopriL (PRINIVIL,ZESTRIL) 5 MG tablet Take 0.5 tablets (2.5 mg total) by mouth once daily.    METAMUCIL, SUGAR, Powd Take 1 Scoop by mouth 2 (two) times daily. (Patient taking differently: Take 1 Scoop by mouth 2 (two) times daily as needed. )    miconazole (MICONAZOLE 7) 2 % vaginal cream Place 1 applicator vaginally every evening.    nut.tx.imp.renal fxn,lac-reduc (NEPRO ORAL) Take by mouth once daily.     nystatin (MYCOSTATIN) cream APPLY  CREAM TOPICALLY TWICE DAILY    ondansetron (ZOFRAN-ODT) 4 MG TbDL Take 1 tablet (4 mg total) by mouth every 8 (eight) hours as needed (nausea).    pantoprazole (PROTONIX) 40 MG tablet Take 1 tablet by mouth once daily    ranolazine (RANEXA) 1,000 mg Tb12 Take 1 tablet (1,000 mg total) by mouth 2 (two) times daily.    sevelamer carbonate (RENVELA ORAL) Take by mouth.    sub-q insulin device, 30 unit (V-GO 30) Nohemi by Misc.(Non-Drug; Combo Route) route.    triamcinolone acetonide 0.5% (KENALOG) 0.5 % Crea APPLY  CREAM TOPICALLY TWICE DAILY    [DISCONTINUED] insulin needles, disposable, (ULTICARE) 31 gauge X 1/4 " Ndle Inject 1 Units into the skin 4 (four) times daily.     Family History     Problem Relation (Age of Onset)    Alcohol abuse Sister, Sister    Asthma Father, Sister, Paternal Uncle, Paternal Grandmother    Breast cancer Mother (65), Sister (70)    Cancer Sister    Depression Maternal Aunt    Diabetes Mother, Brother, Brother, Maternal Aunt, Maternal Uncle, Paternal Grandmother    Emphysema Father    " No Known Problems Maternal Grandfather    Throat cancer Sister        Tobacco Use    Smoking status: Current Every Day Smoker     Packs/day: 0.50     Years: 35.00     Pack years: 17.50     Types: Cigarettes     Start date: 1/19/1987    Smokeless tobacco: Never Used   Substance and Sexual Activity    Alcohol use: Yes     Comment: occ    Drug use: No    Sexual activity: Yes     Partners: Male     Birth control/protection: Surgical     Comment: with one partner for 40 years     Review of Systems   Constitutional: Positive for activity change. Negative for diaphoresis and fever.   Respiratory: Negative for cough, shortness of breath and wheezing.    Cardiovascular: Positive for chest pain. Negative for palpitations and leg swelling.   Gastrointestinal: Positive for vomiting. Negative for abdominal distention, abdominal pain, constipation, diarrhea and nausea.   Genitourinary: Negative for dysuria, flank pain, frequency, hematuria and urgency.   Musculoskeletal: Negative for arthralgias, myalgias, neck pain and neck stiffness.   Skin: Negative for color change and pallor.   Neurological: Negative for dizziness, syncope, weakness, light-headedness and headaches.   Psychiatric/Behavioral: Negative for confusion and decreased concentration.     Objective:     Vital Signs (Most Recent):  Temp: 97 °F (36.1 °C) (08/06/20 0242)  Pulse: (!) 58 (08/06/20 0430)  Resp: 16 (08/06/20 0242)  BP: 126/60 (08/06/20 0242)  SpO2: 95 % (08/06/20 0242) Vital Signs (24h Range):  Temp:  [97 °F (36.1 °C)-99.4 °F (37.4 °C)] 97 °F (36.1 °C)  Pulse:  [58-90] 58  Resp:  [0-25] 16  SpO2:  [89 %-100 %] 95 %  BP: (126-203)/(60-88) 126/60     Weight: 102.5 kg (225 lb 15.5 oz)  Body mass index is 36.47 kg/m².    Physical Exam  Vitals signs and nursing note reviewed.   Constitutional:       General: She is not in acute distress.     Appearance: She is well-developed. She is obese. She is not diaphoretic.   HENT:      Head: Normocephalic and  atraumatic.      Nose: Nose normal.   Eyes:      General: No scleral icterus.     Conjunctiva/sclera: Conjunctivae normal.      Pupils: Pupils are equal, round, and reactive to light.   Neck:      Musculoskeletal: Normal range of motion and neck supple.      Trachea: No tracheal deviation.   Cardiovascular:      Rate and Rhythm: Normal rate and regular rhythm.      Heart sounds: Normal heart sounds. No murmur. No friction rub. No gallop.    Pulmonary:      Effort: Pulmonary effort is normal. No respiratory distress.      Breath sounds: Normal breath sounds. No stridor. No wheezing or rales.   Abdominal:      General: Bowel sounds are normal. There is no distension.      Palpations: Abdomen is soft. There is no mass.      Tenderness: There is no abdominal tenderness. There is no guarding.   Skin:     General: Skin is warm and dry.      Coloration: Skin is not pale.      Findings: No erythema.   Neurological:      Mental Status: She is alert and oriented to person, place, and time.      Cranial Nerves: No cranial nerve deficit.      Motor: No abnormal muscle tone.   Psychiatric:         Behavior: Behavior normal.         Thought Content: Thought content normal.         Judgment: Judgment normal.           CRANIAL NERVES     CN III, IV, VI   Pupils are equal, round, and reactive to light.       Significant Labs:   BMP:   Recent Labs   Lab 08/06/20 0414   *      K 4.3      CO2 26   BUN 43*   CREATININE 5.5*   CALCIUM 9.4   MG 2.1     CBC:   Recent Labs   Lab 08/05/20 2047 08/06/20 0414   WBC 6.48 7.01   HGB 11.2* 10.7*   HCT 34.9* 34.2*    181     CMP:   Recent Labs   Lab 08/05/20 2047 08/06/20 0414    138   K 4.1 4.3   CL 97 100   CO2 30* 26   * 182*   BUN 39* 43*   CREATININE 5.7* 5.5*   CALCIUM 10.2 9.4   PROT 8.1  --    ALBUMIN 4.2  --    BILITOT 0.7  --    ALKPHOS 66  --    AST 20  --    ALT 26  --    ANIONGAP 13 12   EGFRNONAA 8* 8*     Urine Culture: No results for  input(s): LABURIN in the last 48 hours.  Urine Studies: No results for input(s): COLORU, APPEARANCEUA, PHUR, SPECGRAV, PROTEINUA, GLUCUA, KETONESU, BILIRUBINUA, OCCULTUA, NITRITE, UROBILINOGEN, LEUKOCYTESUR, RBCUA, WBCUA, BACTERIA, SQUAMEPITHEL, HYALINECASTS in the last 48 hours.    Invalid input(s): WRIGHTSUR  All pertinent labs within the past 24 hours have been reviewed.    Significant Imaging: I have reviewed all pertinent imaging results/findings within the past 24 hours.

## 2020-08-06 NOTE — PROGRESS NOTES
[facility] Hospital Medicine  Progress Note    Patient Name: Caryn Mauricio  MRN: [id number]  Patient Class: OP- Observation   Admission Date: 8/6/2020  Length of Stay: 0 days  Attending Physician: Ronel Marr MD  Primary Care Provider: Travis Newell MD        Subjective:     Principal Problem:Chest pain        HPI:  Ms. Caryn Mauricio is a 60 y.o. female, with PMH of IDDM-2, HFpEF, CAD, A. Fib (on Eliquis), CAD, HTN, HLD, prior TIA, tobacco use, ESRD on HD, presented to [facility] with chest pain x 2 hours PTA. She describes the pain as a constant heaviness. She noted in the ED it was associated with nausea and sweats, but did not endorse these upon admission. EKG in ED NSR without ischemic changes. Troponin elevated at 0.053. Started on metoprolol in ED when SBP > 200. She states her brother had an MI in his early 50's, she is a smoker w/ h/o CAD, HLD, and prior TIA. She was placed on OBDS,     Overview/Hospital Course:  No notes on file    Interval History: seen on HD, sleepy, arousable; rec'd morphine at outside ED    Review of Systems   Constitutional: Negative for fever.   Respiratory: Negative for cough, shortness of breath and wheezing.    Cardiovascular: Positive for chest pain. Negative for palpitations.   Gastrointestinal: Negative for abdominal pain, constipation, diarrhea, nausea and vomiting.   Genitourinary: Negative for dysuria and hematuria.   Musculoskeletal: Negative for arthralgias and myalgias.   Neurological: Negative for dizziness, weakness, light-headedness and headaches.     Objective:     Vital Signs (Most Recent):  Temp: 97.4 °F (36.3 °C) (08/06/20 0745)  Pulse: (OFF TELE ) (08/06/20 1200)  Resp: 18 (08/06/20 0745)  BP: (!) 114/55 (08/06/20 0745)  SpO2: 98 % (08/06/20 1019) Vital Signs (24h Range):  Temp:  [97 °F (36.1 °C)-99.4 °F (37.4 °C)] 97.4 °F (36.3 °C)  Pulse:  [56-90] 57  Resp:  [0-25] 18  SpO2:  [89 %-100 %] 98 %  BP: (114-203)/(49-88) 156/49      Weight: 102.5 kg (225 lb 15.5 oz)  Body mass index is 36.47 kg/m².  No intake or output data in the 24 hours ending 08/06/20 1559   Physical Exam  Vitals signs and nursing note reviewed.   Constitutional:       General: She is not in acute distress.     Appearance: She is well-developed. She is obese. She is not diaphoretic.   HENT:      Head: Normocephalic and atraumatic.   Eyes:      General: No scleral icterus.  Neck:      Musculoskeletal: Normal range of motion and neck supple.      Trachea: No tracheal deviation.   Cardiovascular:      Rate and Rhythm: Normal rate and regular rhythm.   Pulmonary:      Effort: Pulmonary effort is normal.      Comments: occ rhonchi  Abdominal:      General: Bowel sounds are normal. There is no distension.      Palpations: Abdomen is soft.      Tenderness: There is no abdominal tenderness. There is no guarding.   Skin:     General: Skin is warm and dry.   Neurological:      Mental Status: She is alert and oriented to person, place, and time.      Cranial Nerves: No cranial nerve deficit.      Motor: No abnormal muscle tone.      Comments: Sleepy but arousable         Significant Labs:   CBC:   Recent Labs   Lab 08/05/20 2047 08/06/20 0414   WBC 6.48 7.01   HGB 11.2* 10.7*   HCT 34.9* 34.2*    181     CMP:   Recent Labs   Lab 08/05/20 2047 08/06/20 0414    138   K 4.1 4.3   CL 97 100   CO2 30* 26   * 182*   BUN 39* 43*   CREATININE 5.7* 5.5*   CALCIUM 10.2 9.4   PROT 8.1  --    ALBUMIN 4.2  --    BILITOT 0.7  --    ALKPHOS 66  --    AST 20  --    ALT 26  --    ANIONGAP 13 12   EGFRNONAA 8* 8*     Troponin:   Recent Labs   Lab 08/05/20 2047 08/06/20  0414 08/06/20  0849   TROPONINI 0.053* 0.033* 0.030*     All pertinent labs within the past 24 hours have been reviewed.    Significant Imaging:   EXAMINATION:  XR CHEST 1 VIEW     CLINICAL HISTORY:  CP;  chest pain.     COMPARISON:  06/23/2020     FINDINGS:  Increased interstitial markings bilaterally with  mild linear scarring of the mid lower lung zones.  Cardiac silhouette mildly enlarged with prominence pulmonary vasculature suggesting pulmonary vascular congestion.  No significant change since the prior exam dated 06/23/2020.No suspicious bony abnormality..     Impression:     1. Cardiomegaly with evidence of mild pulmonary vascular congestion.  2. Increased interstitial markings bilaterally with mild linear scarring.  No change since the previous exam.        Electronically signed by: Ronny Preston  Date:                                            08/05/2020  Time:                                           21:46                Assessment/Plan:      * Chest pain  - Ms. Caryn Mauricio presented to [facility] with chest pain   - EKG without ischemia, troponins flat  - Coronary angiography: Mild nonobstructive CAD 7/2019  - cardio recs for stress/ECHO  · The patient's exercise capacity was severely impaired. There was no chest pain.  · Sensitivity is impaired due to the patient's failure to achieve target heart rate.  · The ECG portion of this study is negative for myocardial ischemia.  · The stress echo portion of this study is negative for myocardial ischemia.  · Normal left ventricular systolic function. The estimated ejection fraction is 65%.  · No wall motion abnormalities.  · Normal right ventricular systolic function.    CAD (coronary artery disease)  - cont statin    ESRD (end stage renal disease) on dialysis  - [facility]  - [facility]   - Dr. Chilo Sommer  - [facility] consulted for HD    Hyperlipidemia  - states she does not take statin 2/2 leg cramps   - advised on importance of statin   - Lipitor ordered   - lipid panel pending     Paroxysmal atrial fibrillation  - continue apixaban  - monitor on tele       Tobacco abuse  - declines nicotine patch       OLAG (obstructive sleep apnea)  - CPAP ordered          Chronic anticoagulation  - 2/2 A. Fib   - continue Eliquis BID       CHF (congestive heart  failure)  - last Echo 8/5/18 w/ EF 51%   - appears compensated  - monitor I&O, daily weight   - continue lasix     Depression  - continue citalopram         Hypothyroidism  - continue levothyroxine 150 mcg QD   - TSH 1.461        Essential hypertension  - normotensive at present  - continue home meds: lisinopril 2.5 QD (did not receive today)  - monitor       Type 2 diabetes mellitus with chronic kidney disease on chronic dialysis, with long-term current use of insulin  - last A1C: 9.2  - Diabetic diet   - on 5 units LA qhs  - patient wears Vgo at home, unsure of basal dose    - weight based basal insulin ordered   - SSI with accuchekcs    VTE Risk Mitigation (From admission, onward)         Ordered     apixaban tablet 2.5 mg  2 times daily      08/06/20 0312     IP VTE HIGH RISK PATIENT  Once      08/06/20 0543     Place sequential compression device  Until discontinued      08/06/20 0543                      BRIGIDA TabaresC  Department of Hospital Medicine   [facility]

## 2020-08-06 NOTE — PLAN OF CARE
"Pt arrived via EMS from [place], no noted acute distress, c/o chest pain 8/10, describes it as "pressure", pt laying comfortably in bed and slept throughout nursing care, VSS on 2 L/min via N.C, AAO x 4, no injuries or falls during shift, bed in low locked position, call light in reach, hourly rounds complete, will continue to assess    "

## 2020-08-06 NOTE — PLAN OF CARE
SW met with pt at bedside to complete discharge assessment, verified PCP and uses [facility] in [place] and doesn't want bedside delivery.  No POA or LW.  Pt's daughter, Amelie will provide transportation home.  Pt has dialysis at [facility] at 6:45am.  No needs identified at this time.     08/06/20 1220   Discharge Assessment   Assessment Type Discharge Planning Assessment   Confirmed/corrected address and phone number on facesheet? Yes   Assessment information obtained from? Patient   Communicated expected length of stay with patient/caregiver no   Prior to hospitilization cognitive status: Alert/Oriented   Prior to hospitalization functional status: Independent   Current cognitive status: Alert/Oriented   Current Functional Status: Independent   Lives With child(haley), dependent   Able to Return to Prior Arrangements yes   Is patient able to care for self after discharge? Yes   Readmission Within the Last 30 Days no previous admission in last 30 days   Patient currently being followed by outpatient case management? No   Patient currently receives any other outside agency services? No   Equipment Currently Used at Home none   Do you have any problems affording any of your prescribed medications? No   Is the patient taking medications as prescribed? yes   Does the patient have transportation home? Yes   Transportation Anticipated family or friend will provide   Does the patient receive services at the [facility]? No   Discharge Plan A Home   DME Needed Upon Discharge  none   Patient/Family in Agreement with Plan yes

## 2020-08-06 NOTE — ASSESSMENT & PLAN NOTE
- last Echo 8/5/18 w/ EF 51%   - appears compensated  - monitor I&O, daily weight   - continue lasix

## 2020-08-06 NOTE — HPI
Ms. Caryn Mauricio is a 60 y.o. female, with PMH of IDDM-2, HFpEF, CAD, A. Fib (on Eliquis), CAD, HTN, HLD, prior TIA, tobacco use, ESRD on HD, presented to [facility] with chest pain x 2 hours PTA. She describes the pain as a constant heaviness. She noted in the ED it was associated with nausea and sweats, but did not endorse these upon admission. EKG in ED NSR without ischemic changes. Troponin elevated at 0.053. Started on metoprolol in ED when SBP > 200. She states her brother had an MI in his early 50's, she is a smoker w/ h/o CAD, HLD, and prior TIA. She was placed on OBDS,

## 2020-08-06 NOTE — ASSESSMENT & PLAN NOTE
- states she does not take statin 2/2 leg cramps   - advised on importance of statin   - Lipitor ordered   - lipid panel pending

## 2020-08-06 NOTE — ASSESSMENT & PLAN NOTE
- Ms. Caryn Gandhi Hang presented to [facility] with chest pain   - initial troponin elevated   - EKG without ischemia  - Cardiology consulted   - trend troponin  - monitor on tele  - risk factor assessment w/ A1C & Lipid panel   - anticoagulated on apixaban

## 2020-08-06 NOTE — H&P
[facility] Hospital Medicine  History & Physical    Patient Name: Caryn Mauricio  MRN: [id number]  Admission Date: 8/6/2020  Attending Physician: Ronel Marr MD   Primary Care Provider: Travis Newell MD         Patient information was obtained from patient, past medical records and ER records.     Subjective:     Principal Problem:Chest pain    Chief Complaint: No chief complaint on file.       HPI: Ms. Caryn Mauricio is a 60 y.o. female, with PMH of IDDM-2, HFpEF, CAD, A. Fib (on Eliquis), CAD, HTN, HLD, prior TIA, tobacco use, ESRD on HD, presented to [facility] with chest pain x 2 hours PTA. She describes the pain as a constant heaviness. She noted in the ED it was associated with nausea and sweats, but did not endorse these upon admission. EKG in ED NSR without ischemic changes. Troponin elevated at 0.053. Started on metoprolol in ED when SBP > 200. She states her brother had an MI in his early 50's, she is a smoker w/ h/o CAD, HLD, and prior TIA. She was placed on OBDS,     Past Medical History:   Diagnosis Date    Adjustment disorder     AI (aortic insufficiency)     mild    Anemia     Anticoagulant long-term use     coumadin    Anxiety     Asthma     CHF (congestive heart failure)     CKD (chronic kidney disease) stage 4, GFR 15-29 ml/min 8/14/2014    dialysis x 1 year    Congestion of upper airway     Coronary artery disease     non obstructive    Dependence on renal dialysis     Depression 8/14/2014    Diabetes mellitus     Diabetes mellitus type 2 in obese 5/14/2015    Dialysis patient     Dyspnea     E. coli UTI 2014    Encounter for blood transfusion     Fatigue     GERD (gastroesophageal reflux disease)     Heart failure with preserved ejection fraction 8/14/2014    History of psychiatric hospitalization     10 years ago and [facility]     of psychiatric care     Celexa    Hyperlipemia     Hypertension     Hypothyroidism 8/14/2014     Neuropathy 8/14/2014    PAF (paroxysmal atrial fibrillation)     Psychiatric problem     Pt reports hx of depression    Shortness of breath     Therapy     Per pt - PCP has been prescribing Celexa    Type 2 diabetes mellitus with diabetic nephropathy 8/14/2014       Past Surgical History:   Procedure Laterality Date    ABSCESS DRAINAGE Left     groin    AV FISTULA PLACEMENT, BRACHIOCEPHALIC Left 1/11/2016    CARDIAC CATHETERIZATION  2/17/14, 7/24/09    non-obstructive CAD in RCA    COLONOSCOPY N/A 9/17/2015    Procedure: COLONOSCOPY;  Surgeon: Sorin Schneider MD;  Location: [facility] ENDO;  Service: Endoscopy;  Laterality: N/A;    COLONOSCOPY N/A 3/8/2016    Procedure: COLONOSCOPY;  Surgeon: Luis Bogran-Reyes, MD;  Location: [facility] ENDO;  Service: Endoscopy;  Laterality: N/A;    HYSTERECTOMY  partial; 2000    AUB/fibroids    LEFT HEART CATHETERIZATION Left 7/8/2019    Procedure: Left heart cath;  Surgeon: Charli Ferrera MD;  Location: [facility] CATH LAB;  Service: Cardiology;  Laterality: Left;    TONSILLECTOMY      TUBAL LIGATION      UPPER GASTROINTESTINAL ENDOSCOPY         Review of patient's allergies indicates:   Allergen Reactions    Iodine and iodide containing products Rash       Current Facility-Administered Medications on File Prior to Encounter   Medication    [COMPLETED] aspirin tablet 325 mg    [COMPLETED] atorvastatin tablet 40 mg    [COMPLETED] metoprolol tartrate (LOPRESSOR) tablet 25 mg    [COMPLETED] morphine injection 2 mg    [COMPLETED] nitroGLYCERIN 2% TD oint ointment 1 inch    [COMPLETED] ondansetron injection 4 mg    [COMPLETED] pantoprazole EC tablet 40 mg    [DISCONTINUED] morphine injection 2 mg    [DISCONTINUED] ondansetron injection 4 mg     Current Outpatient Medications on File Prior to Encounter   Medication Sig    aspirin (ECOTRIN) 81 MG EC tablet Take 1 tablet (81 mg total) by mouth once daily.    blood sugar diagnostic Strp 1 strip by Misc.(Non-Drug; Combo Route)  route 3 (three) times daily. True Metrix test strips    blood-glucose meter (TRUE METRIX AIR GLUCOSE METER) kit True Metrix meter kit Use as instructed    citalopram (CELEXA) 10 MG tablet Take 1 tablet (10 mg total) by mouth once daily.    clotrimazole-betamethasone 1-0.05% (LOTRISONE) cream Apply topically 2 (two) times daily. for 7 days    ELIQUIS 2.5 mg Tab TAKE 1 TABLET BY MOUTH TWICE DAILY    ergocalciferol (ERGOCALCIFEROL) 50,000 unit Cap Take 1 capsule (50,000 Units total) by mouth every 7 days.    flash glucose sensor (FREESTYLE KIRILL 14 DAY SENSOR) Kit 1 application by Misc.(Non-Drug; Combo Route) route every 14 (fourteen) days.    furosemide (LASIX) 80 MG tablet Take 1 tablet (80 mg total) by mouth 2 (two) times daily.    gabapentin (NEURONTIN) 100 MG capsule Take 1 capsule (100 mg total) by mouth 3 (three) times daily.    HYDROcodone-acetaminophen (NORCO) 5-325 mg per tablet Take 1 tablet by mouth every 8 (eight) hours as needed for Pain.    insulin lispro (HUMALOG U-100 INSULIN) 100 unit/mL injection Inject 66 Units into the skin continuous.    isosorbide mononitrate (IMDUR) 30 MG 24 hr tablet Take 1 tablet (30 mg total) by mouth once daily.    lancets 33 gauge Misc 1 lancet by Misc.(Non-Drug; Combo Route) route 3 (three) times daily.    levothyroxine (SYNTHROID) 150 MCG tablet Take 1 tablet (150 mcg total) by mouth before breakfast.    linaGLIPtin (TRADJENTA) 5 mg Tab tablet Take 1 tablet (5 mg total) by mouth once daily.    lisinopriL (PRINIVIL,ZESTRIL) 5 MG tablet Take 0.5 tablets (2.5 mg total) by mouth once daily.    METAMUCIL, SUGAR, Powd Take 1 Scoop by mouth 2 (two) times daily. (Patient taking differently: Take 1 Scoop by mouth 2 (two) times daily as needed. )    miconazole (MICONAZOLE 7) 2 % vaginal cream Place 1 applicator vaginally every evening.    nut.tx.imp.renal fxn,lac-reduc (NEPRO ORAL) Take by mouth once daily.     nystatin (MYCOSTATIN) cream APPLY  CREAM TOPICALLY  "TWICE DAILY    ondansetron (ZOFRAN-ODT) 4 MG TbDL Take 1 tablet (4 mg total) by mouth every 8 (eight) hours as needed (nausea).    pantoprazole (PROTONIX) 40 MG tablet Take 1 tablet by mouth once daily    ranolazine (RANEXA) 1,000 mg Tb12 Take 1 tablet (1,000 mg total) by mouth 2 (two) times daily.    sevelamer carbonate (RENVELA ORAL) Take by mouth.    sub-q insulin device, 30 unit (V-GO 30) Nohemi by Misc.(Non-Drug; Combo Route) route.    triamcinolone acetonide 0.5% (KENALOG) 0.5 % Crea APPLY  CREAM TOPICALLY TWICE DAILY    [DISCONTINUED] insulin needles, disposable, (ULTICARE) 31 gauge X 1/4 " Ndle Inject 1 Units into the skin 4 (four) times daily.     Family History     Problem Relation (Age of Onset)    Alcohol abuse Sister, Sister    Asthma Father, Sister, Paternal Uncle, Paternal Grandmother    Breast cancer Mother (65), Sister (70)    Cancer Sister    Depression Maternal Aunt    Diabetes Mother, Brother, Brother, Maternal Aunt, Maternal Uncle, Paternal Grandmother    Emphysema Father    No Known Problems Maternal Grandfather    Throat cancer Sister        Tobacco Use    Smoking status: Current Every Day Smoker     Packs/day: 0.50     Years: 35.00     Pack years: 17.50     Types: Cigarettes     Start date: 1/19/1987    Smokeless tobacco: Never Used   Substance and Sexual Activity    Alcohol use: Yes     Comment: occ    Drug use: No    Sexual activity: Yes     Partners: Male     Birth control/protection: Surgical     Comment: with one partner for 40 years     Review of Systems   Constitutional: Positive for activity change. Negative for diaphoresis and fever.   Respiratory: Negative for cough, shortness of breath and wheezing.    Cardiovascular: Positive for chest pain. Negative for palpitations and leg swelling.   Gastrointestinal: Positive for vomiting. Negative for abdominal distention, abdominal pain, constipation, diarrhea and nausea.   Genitourinary: Negative for dysuria, flank pain, " frequency, hematuria and urgency.   Musculoskeletal: Negative for arthralgias, myalgias, neck pain and neck stiffness.   Skin: Negative for color change and pallor.   Neurological: Negative for dizziness, syncope, weakness, light-headedness and headaches.   Psychiatric/Behavioral: Negative for confusion and decreased concentration.     Objective:     Vital Signs (Most Recent):  Temp: 97 °F (36.1 °C) (08/06/20 0242)  Pulse: (!) 58 (08/06/20 0430)  Resp: 16 (08/06/20 0242)  BP: 126/60 (08/06/20 0242)  SpO2: 95 % (08/06/20 0242) Vital Signs (24h Range):  Temp:  [97 °F (36.1 °C)-99.4 °F (37.4 °C)] 97 °F (36.1 °C)  Pulse:  [58-90] 58  Resp:  [0-25] 16  SpO2:  [89 %-100 %] 95 %  BP: (126-203)/(60-88) 126/60     Weight: 102.5 kg (225 lb 15.5 oz)  Body mass index is 36.47 kg/m².    Physical Exam  Vitals signs and nursing note reviewed.   Constitutional:       General: She is not in acute distress.     Appearance: She is well-developed. She is obese. She is not diaphoretic.   HENT:      Head: Normocephalic and atraumatic.      Nose: Nose normal.   Eyes:      General: No scleral icterus.     Conjunctiva/sclera: Conjunctivae normal.      Pupils: Pupils are equal, round, and reactive to light.   Neck:      Musculoskeletal: Normal range of motion and neck supple.      Trachea: No tracheal deviation.   Cardiovascular:      Rate and Rhythm: Normal rate and regular rhythm.      Heart sounds: Normal heart sounds. No murmur. No friction rub. No gallop.    Pulmonary:      Effort: Pulmonary effort is normal. No respiratory distress.      Breath sounds: Normal breath sounds. No stridor. No wheezing or rales.   Abdominal:      General: Bowel sounds are normal. There is no distension.      Palpations: Abdomen is soft. There is no mass.      Tenderness: There is no abdominal tenderness. There is no guarding.   Skin:     General: Skin is warm and dry.      Coloration: Skin is not pale.      Findings: No erythema.   Neurological:      Mental  Status: She is alert and oriented to person, place, and time.      Cranial Nerves: No cranial nerve deficit.      Motor: No abnormal muscle tone.   Psychiatric:         Behavior: Behavior normal.         Thought Content: Thought content normal.         Judgment: Judgment normal.           CRANIAL NERVES     CN III, IV, VI   Pupils are equal, round, and reactive to light.       Significant Labs:   BMP:   Recent Labs   Lab 08/06/20 0414   *      K 4.3      CO2 26   BUN 43*   CREATININE 5.5*   CALCIUM 9.4   MG 2.1     CBC:   Recent Labs   Lab 08/05/20 2047 08/06/20 0414   WBC 6.48 7.01   HGB 11.2* 10.7*   HCT 34.9* 34.2*    181     CMP:   Recent Labs   Lab 08/05/20 2047 08/06/20 0414    138   K 4.1 4.3   CL 97 100   CO2 30* 26   * 182*   BUN 39* 43*   CREATININE 5.7* 5.5*   CALCIUM 10.2 9.4   PROT 8.1  --    ALBUMIN 4.2  --    BILITOT 0.7  --    ALKPHOS 66  --    AST 20  --    ALT 26  --    ANIONGAP 13 12   EGFRNONAA 8* 8*     Urine Culture: No results for input(s): LABURIN in the last 48 hours.  Urine Studies: No results for input(s): COLORU, APPEARANCEUA, PHUR, SPECGRAV, PROTEINUA, GLUCUA, KETONESU, BILIRUBINUA, OCCULTUA, NITRITE, UROBILINOGEN, LEUKOCYTESUR, RBCUA, WBCUA, BACTERIA, SQUAMEPITHEL, HYALINECASTS in the last 48 hours.    Invalid input(s): ROBERTOSUR  All pertinent labs within the past 24 hours have been reviewed.    Significant Imaging: I have reviewed all pertinent imaging results/findings within the past 24 hours.          Assessment/Plan:     * Chest pain  - Ms. Caryn Mauricio presented to [facility] with chest pain   - initial troponin elevated   - EKG without ischemia  - Cardiology consulted   - trend troponin  - monitor on tele  - risk factor assessment w/ A1C & Lipid panel   - anticoagulated on apixaban    CAD (coronary artery disease)  - continue ASA  - statin started     ESRD (end stage renal disease) on dialysis  - [facility]  - [facility]   - Dr. Woo  Midoh  - [place] consulted     Hyperlipidemia  - states she does not take statin 2/2 leg cramps   - advised on importance of statin   - Lipitor ordered   - lipid panel pending     Paroxysmal atrial fibrillation  - continue apixaban  - monitor on tele       Tobacco abuse  - declines nicotine patch       OLGA (obstructive sleep apnea)  - CPAP ordered       Chronic anticoagulation  - 2/2 A. Fib   - continue Eliquis BID       CHF (congestive heart failure)  - last Echo 8/5/18 w/ EF 51%   - monitor I&O, daily weight   - continue lasix     Depression  - continue citalopram         Hypothyroidism  - continue levothyroxine 150 mcg QD   - last TSH elevated  - TSH added to AM labs     Essential hypertension  - normotensive at present  - continue home meds: imdur 30 QD, lisinopril 2.5 QD   - monitor       Type 2 diabetes mellitus with chronic kidney disease on chronic dialysis, with long-term current use of insulin  - last A1C:   Lab Results   Component Value Date    HGBA1C 9.2 (H) 06/22/2020    - A1C pending for AM   - Diabetic diet   - patient wears Vgo at home, unsure of basal dose    - weight based basal insulin ordered   - SSI with accuchekcs [id number] ordered     VTE Risk Mitigation (From admission, onward)         Ordered     apixaban tablet 2.5 mg  2 times daily      08/06/20 0312     IP VTE HIGH RISK PATIENT  Once      08/06/20 0543     Place sequential compression device  Until discontinued      08/06/20 0543                   BRIGIDA DonaldsonC  Department of Hospital Medicine   [facility]

## 2020-08-06 NOTE — ASSESSMENT & PLAN NOTE
- [facility]  - [facility]   - Dr. Chilo Sommer  - [facility] consulted

## 2020-08-06 NOTE — ASSESSMENT & PLAN NOTE
- normotensive at present  - continue home meds: lisinopril 2.5 QD (did not receive today)  - monitor

## 2020-08-06 NOTE — ASSESSMENT & PLAN NOTE
- last A1C: 9.2  - Diabetic diet   - on 5 units LA qhs  - patient wears Vgo at home, unsure of basal dose    - weight based basal insulin ordered   - SSI with accuchekcs

## 2020-08-06 NOTE — CONSULTS
[facility]  Cardiology  Consult Note    Patient Name: Caryn Mauricio  MRN: [id number]  Admission Date: 8/6/2020  Hospital Length of Stay: 0 days  Code Status: Full Code   Attending Provider: Ronel Marr MD   Consulting Provider: Sandra Gee MD  Primary Care Physician: Travis Newell MD  Principal Problem:Chest pain    Patient information was obtained from patient and ER records.     Inpatient consult to Cardiology  Consult performed by: Sandra Gee MD  Consult ordered by: Gloria Field PA-C  Reason for consult: Chest pain        Subjective:     Chief Complaint:  Chest pain.     HPI:    Caryn Mauricio is a 60 y.o.female with hypertension, hypercholesterolemia and diabetes mellitus type 2. She is moderately obese. She is a current smoker being unable to quit. She has end stage kidney disease being on HD T,T & S. She has occasional chest pains over the last few years she tells me. In 7/2019 she underwent stress testing that was abnormal. On 7/8/2019 she underwent coronary angiography that revealed mild nonobstructive coronary artery disease with 30% plaquing seen in the mid LAD and RCA. Accordingly in was felt her chest pain had a noncardiac cause. In addition she has paroxysmal atrial fibrillation and is being anticoagulated with apixiban.    In the afternoon of 8/5/2020 she was sweeping her floors and did her laundry. At about 6 pm she began feeling a pressure over the lower left chest. She had nausea and may have been mildly short of breath. The pain persisted for a few hours. She presented to the ER at [place]. She was hypertensive. The ECG was essentially normal with no acute ST changes. She received NTG paste but the discomfort persisted. After she received pain medications she felt better and fell asleep. It was decied to transfer her to [facility] to be evaluated further.          Past Medical History:   Diagnosis Date    Adjustment disorder     AI (aortic insufficiency)      mild    Anemia     Anticoagulant long-term use     coumadin    Anxiety     Asthma     CHF (congestive heart failure)     CKD (chronic kidney disease) stage 4, GFR 15-29 ml/min 8/14/2014    dialysis x 1 year    Congestion of upper airway     Coronary artery disease     non obstructive    Dependence on renal dialysis     Depression 8/14/2014    Diabetes mellitus     Diabetes mellitus type 2 in obese 5/14/2015    Dialysis patient     Dyspnea     E. coli UTI 2014    Encounter for blood transfusion     Fatigue     GERD (gastroesophageal reflux disease)     Heart failure with preserved ejection fraction 8/14/2014    History of psychiatric hospitalization     10 years ago and [facility]    Hx of psychiatric care     Celexa    Hyperlipemia     Hypertension     Hypothyroidism 8/14/2014    Neuropathy 8/14/2014    PAF (paroxysmal atrial fibrillation)     Psychiatric problem     Pt reports hx of depression    Shortness of breath     Therapy     Per pt - PCP has been prescribing Celexa    Type 2 diabetes mellitus with diabetic nephropathy 8/14/2014       Past Surgical History:   Procedure Laterality Date    ABSCESS DRAINAGE Left     groin    AV FISTULA PLACEMENT, BRACHIOCEPHALIC Left 1/11/2016    CARDIAC CATHETERIZATION  2/17/14, 7/24/09    non-obstructive CAD in RCA    COLONOSCOPY N/A 9/17/2015    Procedure: COLONOSCOPY;  Surgeon: Sorin Schneider MD;  Location: [facility];  Service: Endoscopy;  Laterality: N/A;    COLONOSCOPY N/A 3/8/2016    Procedure: COLONOSCOPY;  Surgeon: Luis Bogran-Reyes, MD;  Location: [facility] ENDO;  Service: Endoscopy;  Laterality: N/A;    HYSTERECTOMY  partial; 2000    AUB/fibroids    LEFT HEART CATHETERIZATION Left 7/8/2019    Procedure: Left heart cath;  Surgeon: Charli Ferrera MD;  Location: [facility] CATH LAB;  Service: Cardiology;  Laterality: Left;    TONSILLECTOMY      TUBAL LIGATION      UPPER GASTROINTESTINAL ENDOSCOPY         Review of patient's  allergies indicates:   Allergen Reactions    Iodine and iodide containing products Rash       Current Facility-Administered Medications on File Prior to Encounter   Medication    [COMPLETED] aspirin tablet 325 mg    [COMPLETED] atorvastatin tablet 40 mg    [COMPLETED] metoprolol tartrate (LOPRESSOR) tablet 25 mg    [COMPLETED] morphine injection 2 mg    [COMPLETED] nitroGLYCERIN 2% TD oint ointment 1 inch    [COMPLETED] ondansetron injection 4 mg    [COMPLETED] pantoprazole EC tablet 40 mg    [DISCONTINUED] morphine injection 2 mg    [DISCONTINUED] ondansetron injection 4 mg     Current Outpatient Medications on File Prior to Encounter   Medication Sig    aspirin (ECOTRIN) 81 MG EC tablet Take 1 tablet (81 mg total) by mouth once daily.    blood sugar diagnostic Strp 1 strip by Misc.(Non-Drug; Combo Route) route 3 (three) times daily. True Metrix test strips    blood-glucose meter (TRUE METRIX AIR GLUCOSE METER) kit True Metrix meter kit Use as instructed    citalopram (CELEXA) 10 MG tablet Take 1 tablet (10 mg total) by mouth once daily.    clotrimazole-betamethasone 1-0.05% (LOTRISONE) cream Apply topically 2 (two) times daily. for 7 days    ELIQUIS 2.5 mg Tab TAKE 1 TABLET BY MOUTH TWICE DAILY    ergocalciferol (ERGOCALCIFEROL) 50,000 unit Cap Take 1 capsule (50,000 Units total) by mouth every 7 days.    flash glucose sensor (FREESTYLE KIRILL 14 DAY SENSOR) Kit 1 application by Misc.(Non-Drug; Combo Route) route every 14 (fourteen) days.    furosemide (LASIX) 80 MG tablet Take 1 tablet (80 mg total) by mouth 2 (two) times daily.    gabapentin (NEURONTIN) 100 MG capsule Take 1 capsule (100 mg total) by mouth 3 (three) times daily.    HYDROcodone-acetaminophen (NORCO) 5-325 mg per tablet Take 1 tablet by mouth every 8 (eight) hours as needed for Pain.    insulin lispro (HUMALOG U-100 INSULIN) 100 unit/mL injection Inject 66 Units into the skin continuous.    isosorbide mononitrate (IMDUR) 30  "MG 24 hr tablet Take 1 tablet (30 mg total) by mouth once daily.    lancets 33 gauge Misc 1 lancet by Misc.(Non-Drug; Combo Route) route 3 (three) times daily.    levothyroxine (SYNTHROID) 150 MCG tablet Take 1 tablet (150 mcg total) by mouth before breakfast.    linaGLIPtin (TRADJENTA) 5 mg Tab tablet Take 1 tablet (5 mg total) by mouth once daily.    lisinopriL (PRINIVIL,ZESTRIL) 5 MG tablet Take 0.5 tablets (2.5 mg total) by mouth once daily.    METAMUCIL, SUGAR, Powd Take 1 Scoop by mouth 2 (two) times daily. (Patient taking differently: Take 1 Scoop by mouth 2 (two) times daily as needed. )    miconazole (MICONAZOLE 7) 2 % vaginal cream Place 1 applicator vaginally every evening.    nut.tx.imp.renal fxn,lac-reduc (NEPRO ORAL) Take by mouth once daily.     nystatin (MYCOSTATIN) cream APPLY  CREAM TOPICALLY TWICE DAILY    ondansetron (ZOFRAN-ODT) 4 MG TbDL Take 1 tablet (4 mg total) by mouth every 8 (eight) hours as needed (nausea).    pantoprazole (PROTONIX) 40 MG tablet Take 1 tablet by mouth once daily    ranolazine (RANEXA) 1,000 mg Tb12 Take 1 tablet (1,000 mg total) by mouth 2 (two) times daily.    sevelamer carbonate (RENVELA ORAL) Take by mouth.    sub-q insulin device, 30 unit (V-GO 30) Nohemi by Misc.(Non-Drug; Combo Route) route.    triamcinolone acetonide 0.5% (KENALOG) 0.5 % Crea APPLY  CREAM TOPICALLY TWICE DAILY    [DISCONTINUED] insulin needles, disposable, (ULTICARE) 31 gauge X 1/4 " Ndle Inject 1 Units into the skin 4 (four) times daily.     Family History     Problem Relation (Age of Onset)    Alcohol abuse Sister, Sister    Asthma Father, Sister, Paternal Uncle, Paternal Grandmother    Breast cancer Mother (65), Sister (70)    Cancer Sister    Depression Maternal Aunt    Diabetes Mother, Brother, Brother, Maternal Aunt, Maternal Uncle, Paternal Grandmother    Emphysema Father    No Known Problems Maternal Grandfather    Throat cancer Sister        Tobacco Use    Smoking status: " Current Every Day Smoker     Packs/day: 0.50     Years: 35.00     Pack years: 17.50     Types: Cigarettes     Start date: 1/19/1987    Smokeless tobacco: Never Used   Substance and Sexual Activity    Alcohol use: Yes     Comment: occ    Drug use: No    Sexual activity: Yes     Partners: Male     Birth control/protection: Surgical     Comment: with one partner for 40 years     Review of Systems   Constitution: Negative for chills, fever and malaise/fatigue.   HENT: Negative for nosebleeds.    Eyes: Negative for double vision, vision loss in left eye and vision loss in right eye.   Cardiovascular: Positive for chest pain and dyspnea on exertion. Negative for claudication, irregular heartbeat, leg swelling, near-syncope, orthopnea, palpitations, paroxysmal nocturnal dyspnea and syncope.   Respiratory: Negative for cough, hemoptysis, shortness of breath and wheezing.    Endocrine: Negative for cold intolerance and heat intolerance.   Hematologic/Lymphatic: Negative for bleeding problem. Does not bruise/bleed easily.   Skin: Negative for color change and rash.   Musculoskeletal: Negative for back pain, falls, muscle weakness and myalgias.   Gastrointestinal: Negative for heartburn, hematemesis, hematochezia, hemorrhoids, jaundice, melena, nausea and vomiting.   Genitourinary: Negative for dysuria and hematuria.   Neurological: Negative for dizziness, focal weakness, headaches, light-headedness, loss of balance, numbness, vertigo and weakness.   Psychiatric/Behavioral: Negative for altered mental status, depression and memory loss. The patient is not nervous/anxious.    Allergic/Immunologic: Negative for hives and persistent infections.     Objective:     Vital Signs (Most Recent):  Temp: 97.4 °F (36.3 °C) (08/06/20 0745)  Pulse: 60 (08/06/20 0745)  Resp: 18 (08/06/20 0745)  BP: (!) 114/55 (08/06/20 0745)  SpO2: 98 % (08/06/20 0745) Vital Signs (24h Range):  Temp:  [97 °F (36.1 °C)-99.4 °F (37.4 °C)] 97.4 °F (36.3  °C)  Pulse:  [58-90] 60  Resp:  [0-25] 18  SpO2:  [89 %-100 %] 98 %  BP: (114-203)/(55-88) 114/55     Weight: 102.5 kg (225 lb 15.5 oz)  Body mass index is 36.47 kg/m².    SpO2: 98 %  O2 Device (Oxygen Therapy): nasal cannula    No intake or output data in the 24 hours ending 08/06/20 0936    Lines/Drains/Airways     Drain                 Hemodialysis AV Fistula Left upper arm -- days          Peripheral Intravenous Line                 Peripheral IV - Single Lumen 08/05/20 2318 20 G Right Antecubital less than 1 day                Physical Exam   Constitutional: She is oriented to person, place, and time. She appears well-developed and well-nourished.  Non-toxic appearance. No distress.   HENT:   Head: Normocephalic and atraumatic.   Nose: Nose normal.   Eyes: Right eye exhibits no discharge. Left eye exhibits no discharge. Right conjunctiva is not injected. Left conjunctiva is not injected. Right pupil is round. Left pupil is round. Pupils are equal.   Neck: Neck supple. No JVD present. Carotid bruit is not present. No thyromegaly present.   Cardiovascular: Normal rate, regular rhythm, S1 normal and S2 normal.  No extrasystoles are present. PMI is not displaced. Exam reveals gallop and S4.   No murmur heard.  Pulses:       Radial pulses are 2+ on the right side and 2+ on the left side.        Femoral pulses are 2+ on the right side and 2+ on the left side.       Dorsalis pedis pulses are 2+ on the right side and 2+ on the left side.        Posterior tibial pulses are 2+ on the right side and 2+ on the left side.   Pulmonary/Chest: Effort normal. She has rhonchi. She exhibits tenderness and bony tenderness.   Abdominal: Soft. Normal appearance. There is no hepatosplenomegaly. There is no abdominal tenderness.   Musculoskeletal:      Right ankle: She exhibits no swelling, no ecchymosis and no deformity.      Left ankle: She exhibits no swelling, no ecchymosis and no deformity.   Lymphadenopathy:        Head (right  side): No submandibular adenopathy present.        Head (left side): No submandibular adenopathy present.     She has no cervical adenopathy.   Neurological: She is alert and oriented to person, place, and time. She is not disoriented. No cranial nerve deficit.   Skin: Skin is warm, dry and intact. No rash noted. She is not diaphoretic.   Psychiatric: She has a normal mood and affect. Her speech is normal and behavior is normal. Judgment and thought content normal. Cognition and memory are normal.     Current Medications:     sodium chloride 0.9%   Intravenous Once    apixaban  2.5 mg Oral BID    aspirin  81 mg Oral Daily    atorvastatin  10 mg Oral QHS    citalopram  10 mg Oral Daily    furosemide  80 mg Oral BID    gabapentin  100 mg Oral TID    insulin detemir U-100  5 Units Subcutaneous Daily    isosorbide mononitrate  30 mg Oral Daily    levothyroxine  150 mcg Oral Before breakfast    lisinopriL  2.5 mg Oral Daily    mupirocin   Nasal BID     Current Laboratory Results:    Recent Results (from the past 24 hour(s))   POCT WET PREP    Collection Time: 08/05/20 11:40 AM   Result Value Ref Range    Yeast Wet Prep Negative Negative    Gardnerella vaginalis Negative Negative    Trichomonas, UA Negative Negative    Other Microsc. Observations na     Clue Cells, POC Negative Negative    POC Bacterial Vaginosis Negative Negative   Comprehensive metabolic panel    Collection Time: 08/05/20  8:47 PM   Result Value Ref Range    Sodium 140 136 - 145 mmol/L    Potassium 4.1 3.5 - 5.1 mmol/L    Chloride 97 95 - 110 mmol/L    CO2 30 (H) 23 - 29 mmol/L    Glucose 174 (H) 70 - 110 mg/dL    BUN, Bld 39 (H) 6 - 20 mg/dL    Creatinine 5.7 (H) 0.5 - 1.4 mg/dL    Calcium 10.2 8.7 - 10.5 mg/dL    Total Protein 8.1 6.0 - 8.4 g/dL    Albumin 4.2 3.5 - 5.2 g/dL    Total Bilirubin 0.7 0.1 - 1.0 mg/dL    Alkaline Phosphatase 66 55 - 135 U/L    AST 20 10 - 40 U/L    ALT 26 10 - 44 U/L    Anion Gap 13 8 - 16 mmol/L    eGFR if   9 (A) >60 mL/min/1.73 m^2    eGFR if non African American 8 (A) >60 mL/min/1.73 m^2   Troponin I    Collection Time: 08/05/20  8:47 PM   Result Value Ref Range    Troponin I 0.053 (H) 0.000 - 0.026 ng/mL   CBC auto differential    Collection Time: 08/05/20  8:47 PM   Result Value Ref Range    WBC 6.48 3.90 - 12.70 K/uL    RBC 3.82 (L) 4.00 - 5.40 M/uL    Hemoglobin 11.2 (L) 12.0 - 16.0 g/dL    Hematocrit 34.9 (L) 37.0 - 48.5 %    Mean Corpuscular Volume 91 82 - 98 fL    Mean Corpuscular Hemoglobin 29.3 27.0 - 31.0 pg    Mean Corpuscular Hemoglobin Conc 32.1 32.0 - 36.0 g/dL    RDW 15.2 (H) 11.5 - 14.5 %    Platelets 188 150 - 350 K/uL    MPV 10.5 9.2 - 12.9 fL    Immature Granulocytes 0.2 0.0 - 0.5 %    Gran # (ANC) 3.7 1.8 - 7.7 K/uL    Immature Grans (Abs) 0.01 0.00 - 0.04 K/uL    Lymph # 1.8 1.0 - 4.8 K/uL    Mono # 0.8 0.3 - 1.0 K/uL    Eos # 0.2 0.0 - 0.5 K/uL    Baso # 0.03 0.00 - 0.20 K/uL    nRBC 0 0 /100 WBC    Gran% 56.5 38.0 - 73.0 %    Lymph% 28.1 18.0 - 48.0 %    Mono% 12.2 4.0 - 15.0 %    Eosinophil% 2.5 0.0 - 8.0 %    Basophil% 0.5 0.0 - 1.9 %    Differential Method Automated    CK    Collection Time: 08/05/20  8:47 PM   Result Value Ref Range     20 - 180 U/L   CK-MB    Collection Time: 08/05/20  8:47 PM   Result Value Ref Range     20 - 180 U/L    CPK MB 3.0 0.1 - 6.5 ng/mL    MB% 2.5 0.0 - 5.0 %   Brain Natriuretic Peptide    Collection Time: 08/05/20  8:47 PM   Result Value Ref Range     (H) 0 - 99 pg/mL   Protime-INR    Collection Time: 08/05/20  8:47 PM   Result Value Ref Range    Prothrombin Time 10.7 9.0 - 12.5 sec    INR 1.0 0.8 - 1.2   APTT    Collection Time: 08/05/20  8:47 PM   Result Value Ref Range    aPTT 32.2 (H) 21.0 - 32.0 sec   D dimer, quantitative    Collection Time: 08/05/20  8:47 PM   Result Value Ref Range    D-Dimer 0.34 <0.50 mg/L FEU   COVID-19 Rapid Screening    Collection Time: 08/05/20 11:37 PM   Result Value Ref Range    SARS-CoV-2  RNA, Amplification, Qual Negative Negative   Troponin I    Collection Time: 08/06/20  4:14 AM   Result Value Ref Range    Troponin I 0.033 (H) 0.000 - 0.026 ng/mL   CBC auto differential    Collection Time: 08/06/20  4:14 AM   Result Value Ref Range    WBC 7.01 3.90 - 12.70 K/uL    RBC 3.70 (L) 4.00 - 5.40 M/uL    Hemoglobin 10.7 (L) 12.0 - 16.0 g/dL    Hematocrit 34.2 (L) 37.0 - 48.5 %    Mean Corpuscular Volume 92 82 - 98 fL    Mean Corpuscular Hemoglobin 28.9 27.0 - 31.0 pg    Mean Corpuscular Hemoglobin Conc 31.3 (L) 32.0 - 36.0 g/dL    RDW 15.2 (H) 11.5 - 14.5 %    Platelets 181 150 - 350 K/uL    MPV 10.8 9.2 - 12.9 fL    Immature Granulocytes 0.3 0.0 - 0.5 %    Gran # (ANC) 4.1 1.8 - 7.7 K/uL    Immature Grans (Abs) 0.02 0.00 - 0.04 K/uL    Lymph # 2.1 1.0 - 4.8 K/uL    Mono # 0.6 0.3 - 1.0 K/uL    Eos # 0.1 0.0 - 0.5 K/uL    Baso # 0.03 0.00 - 0.20 K/uL    nRBC 0 0 /100 WBC    Gran% 58.7 38.0 - 73.0 %    Lymph% 29.8 18.0 - 48.0 %    Mono% 9.1 4.0 - 15.0 %    Eosinophil% 1.7 0.0 - 8.0 %    Basophil% 0.4 0.0 - 1.9 %    Differential Method Automated    Basic metabolic panel    Collection Time: 08/06/20  4:14 AM   Result Value Ref Range    Sodium 138 136 - 145 mmol/L    Potassium 4.3 3.5 - 5.1 mmol/L    Chloride 100 95 - 110 mmol/L    CO2 26 23 - 29 mmol/L    Glucose 182 (H) 70 - 110 mg/dL    BUN, Bld 43 (H) 6 - 20 mg/dL    Creatinine 5.5 (H) 0.5 - 1.4 mg/dL    Calcium 9.4 8.7 - 10.5 mg/dL    Anion Gap 12 8 - 16 mmol/L    eGFR if African American 9 (A) >60 mL/min/1.73 m^2    eGFR if non African American 8 (A) >60 mL/min/1.73 m^2   Magnesium    Collection Time: 08/06/20  4:14 AM   Result Value Ref Range    Magnesium 2.1 1.6 - 2.6 mg/dL   Phosphorus    Collection Time: 08/06/20  4:14 AM   Result Value Ref Range    Phosphorus 5.4 (H) 2.7 - 4.5 mg/dL   TSH    Collection Time: 08/06/20  4:14 AM   Result Value Ref Range    TSH 1.461 0.400 - 4.000 uIU/mL   POCT glucose    Collection Time: 08/06/20  7:30 AM   Result  Value Ref Range    POCT Glucose 183 (H) 70 - 110 mg/dL     Current Imaging Results:    No orders to display     ECG: Normal SR. Normal ST-T waves.    Assessment and Plan:     Active Diagnoses:    Diagnosis Date Noted POA    PRINCIPAL PROBLEM:  Chest pain [R07.9] 08/04/2018 Yes    CAD (coronary artery disease) [I25.10] 07/15/2019 Yes    ESRD (end stage renal disease) on dialysis [N18.6, Z99.2] 11/13/2017 Not Applicable    Hyperlipidemia [E78.5] 11/13/2017 Yes    Paroxysmal atrial fibrillation [I48.0] 11/13/2017 Yes    Tobacco abuse [Z72.0] 08/14/2017 Yes    OLGA (obstructive sleep apnea) [G47.33] 11/04/2015 Yes    Chronic anticoagulation [Z79.01] 06/29/2015 Not Applicable    CHF (congestive heart failure) [I50.9] 04/13/2015 Yes     Chronic    Depression [F32.9] 08/14/2014 Yes    Essential hypertension [I10] 08/14/2014 Yes    Hypothyroidism [E03.9] 08/14/2014 Yes    Type 2 diabetes mellitus with chronic kidney disease on chronic dialysis, with long-term current use of insulin [E11.22, N18.6, Z99.2, Z79.4] 08/14/2014 Not Applicable      Problems Resolved During this Admission:     Assessment and Plan:    1. Chest Pain   Several years of chest pain.   7/8/2019: Coronary angiography: Mild nonobstructive CAD.   8/5/2020: Presents with chest pain without any ECG changes and troponin negative.   Appears chest pain has a noncardiac cause and most likely of musculoskelatal origin.   Will do Stress Echo.    2. Atrial Fibrillation   Paroxysmal atrial fibrillation.   On apixiban 2.5 mg Q12.   No recent clinical recurrence.    3. Chronic Anticoagulation   As on apixiban will discontinue aspirin.    4. Hypertension   On lisinopril 2.5 mg Q24.    5. Hypercholesterolemia   On atorvastatin 10 mg Q24.    6. Diabetes Mellitus, Type 2   1996: Diagnosed.    7. End Stage Kidney Disease   On HD T,T & S.        VTE Risk Mitigation (From admission, onward)         Ordered     apixaban tablet 2.5 mg  2 times daily      08/06/20  0312     IP VTE HIGH RISK PATIENT  Once      08/06/20 0543     Place sequential compression device  Until discontinued      08/06/20 0543                Thank you for your consult.    I will follow-up with patient. Please contact us if you have any additional questions.    Sandra Gee MD  Cardiology   [facility]

## 2020-08-06 NOTE — CONSULTS
Consult Note  Nephrology    Consult Requested By: Ronel Marr MD  Reason for Consult: ESRD    SUBJECTIVE:     History of Present Illness:  Patient is a 60 y.o. female presents with transfer from [facility] with CP and in need of CV eval.  Extensive medical hx as outlined below including ESRD on HD TTS at [facility] with Dr. Sommer.  Consulted for HD needs.  Pt seen and examined.  Drowsy from MS.  Epic reviewed.  Consents signed for HD.  Discussed with Dr. Gee and Natacha QUESADA.      No CP/SOB/N/V/D/F/C.      Past Medical History:   Diagnosis Date    Adjustment disorder     AI (aortic insufficiency)     mild    Anemia     Anticoagulant long-term use     coumadin    Anxiety     Asthma     CHF (congestive heart failure)     CKD (chronic kidney disease) stage 4, GFR 15-29 ml/min 8/14/2014    dialysis x 1 year    Congestion of upper airway     Coronary artery disease     non obstructive    Dependence on renal dialysis     Depression 8/14/2014    Diabetes mellitus     Diabetes mellitus type 2 in obese 5/14/2015    Dialysis patient     Dyspnea     E. coli UTI 2014    Encounter for blood transfusion     Fatigue     GERD (gastroesophageal reflux disease)     Heart failure with preserved ejection fraction 8/14/2014    History of psychiatric hospitalization     10 years ago [facility]    Hx of psychiatric care     Celexa    Hyperlipemia     Hypertension     Hypothyroidism 8/14/2014    Neuropathy 8/14/2014    PAF (paroxysmal atrial fibrillation)     Psychiatric problem     Pt reports hx of depression    Shortness of breath     Therapy     Per pt - PCP has been prescribing Celexa    Type 2 diabetes mellitus with diabetic nephropathy 8/14/2014     Past Surgical History:   Procedure Laterality Date    ABSCESS DRAINAGE Left     groin    AV FISTULA PLACEMENT, BRACHIOCEPHALIC Left 1/11/2016    CARDIAC CATHETERIZATION  2/17/14, 7/24/09    non-obstructive CAD in RCA     COLONOSCOPY N/A 9/17/2015    Procedure: COLONOSCOPY;  Surgeon: Sorin Schneider MD;  Location: [facility] ENDO;  Service: Endoscopy;  Laterality: N/A;    COLONOSCOPY N/A 3/8/2016    Procedure: COLONOSCOPY;  Surgeon: Luis Bogran-Reyes, MD;  Location: [facility] ENDO;  Service: Endoscopy;  Laterality: N/A;    HYSTERECTOMY  partial; 2000    AUB/fibroids    LEFT HEART CATHETERIZATION Left 7/8/2019    Procedure: Left heart cath;  Surgeon: Charli Ferrera MD;  Location: [facility] CATH LAB;  Service: Cardiology;  Laterality: Left;    TONSILLECTOMY      TUBAL LIGATION      UPPER GASTROINTESTINAL ENDOSCOPY       Family History   Problem Relation Age of Onset    Diabetes Mother     Breast cancer Mother 65        one breast removed    Asthma Father     Emphysema Father     Alcohol abuse Sister     Diabetes Brother     Asthma Sister     Breast cancer Sister 70    Cancer Sister     Throat cancer Sister     Alcohol abuse Sister     Diabetes Brother     Depression Maternal Aunt     Diabetes Maternal Aunt     Diabetes Maternal Uncle     Asthma Paternal Uncle     No Known Problems Maternal Grandfather     Asthma Paternal Grandmother     Diabetes Paternal Grandmother     Colon cancer Neg Hx      Social History     Tobacco Use    Smoking status: Current Every Day Smoker     Packs/day: 0.50     Years: 35.00     Pack years: 17.50     Types: Cigarettes     Start date: 1/19/1987    Smokeless tobacco: Never Used   Substance Use Topics    Alcohol use: Yes     Comment: occ    Drug use: No       Review of patient's allergies indicates:   Allergen Reactions    Iodine and iodide containing products Rash        Review of Systems:  Constitutional: No fever or chills  Respiratory: No cough or shortness of breath  Cardiovascular: No chest pain or palpitations  Gastrointestinal: No nausea or vomiting  Neurological: No confusion or weakness    OBJECTIVE:     Vital Signs (Most Recent)  Temp: 97.4 °F (36.3 °C) (08/06/20 0745)  Pulse:  (!) 59 (08/06/20 1019)  Resp: 18 (08/06/20 0745)  BP: (!) 114/55 (08/06/20 0745)  SpO2: 98 % (08/06/20 1019)    Vital Signs Range (Last 24H):  Temp:  [97 °F (36.1 °C)-99.4 °F (37.4 °C)]   Pulse:  [56-90]   Resp:  [0-25]   BP: (114-203)/(49-88)   SpO2:  [89 %-100 %]     No intake or output data in the 24 hours ending 08/06/20 1226    Physical Exam:  General appearance: Well developed, well nourished  Eyes:  Conjunctivae/corneas clear. PERRL.  Lungs: Normal respiratory effort,   clear to auscultation bilaterally   Heart: Regular rate and rhythm, S1, S2 normal, no murmur, rub or lv.  Abdomen: Soft, non-tender non-distended; bowel sounds normal; no masses,  no organomegaly  Extremities: No cyanosis or clubbing. Trace edema.    Skin: Skin color, texture, turgor normal. No rashes or lesions  Neurologic: Normal strength and tone. No focal numbness or weakness  Left arm AVF       Laboratory:  Recent Labs   Lab 08/06/20  0414   WBC 7.01   RBC 3.70*   HGB 10.7*   HCT 34.2*      MCV 92   MCH 28.9   MCHC 31.3*     BMP:   Recent Labs   Lab 08/06/20  0414   *      K 4.3      CO2 26   BUN 43*   CREATININE 5.5*   CALCIUM 9.4   MG 2.1     Lab Results   Component Value Date    CALCIUM 9.4 08/06/2020    PHOS 5.4 (H) 08/06/2020     BNP  Recent Labs   Lab 08/05/20 2047   *   No results found for: URICACID  Lab Results   Component Value Date    IRON 41 10/12/2016    TIBC 272 10/12/2016    FERRITIN 71 10/12/2016     Lab Results   Component Value Date    .0 (H) 06/30/2016    CALCIUM 9.4 08/06/2020    PHOS 5.4 (H) 08/06/2020       Diagnostic Results:  No orders to display       ASSESSMENT/PLAN:     1. ESRD on HD TTS at [facility] with Dr. Midoh (N18.6, Z99.2):  Consent signed.  HD today and continue TTS while inpt.  Renally dose meds, avoid nephrotoxins, and monitor I/O's closely.  2. HTN (I12.9):  Stable on current meds.  3. CP/CAD (R07.9, I25.10):  Defer to cards.  Treadmill ordered.    4. Anemia of CKD (D63.1):  At goal.       Thanks for consult  See above  Will follow along.

## 2020-08-06 NOTE — PLAN OF CARE
(Physician in Lead of Transfers)   Outside Transfer Acceptance Note / Regional Referral Center      Transferring Physician: Sunny Rede MD    Accepting Physician: Grady Jarvis MD    Date of Acceptance: 08/05/2020    Transferring Facility: [facility]    Reason for Transfer: higher level of care    Report from Transferring Physician/Hospital course:  Patient is a 60 y.o. female who has a past medical history of Adjustment disorder, AI, Anemia, Anticoagulant long-term use, Anxiety, Asthma, CHF, CKD stage 4, Coronary artery disease, Depression, Diabetes mellitus type 2 in obese, Heart failure with preserved ejection fraction, Hyperlipemia, Hypertension, Hypothyroidism, Neuropathy, PAF presented with left sided chest pain. She has history of CAD. EKG with no ST changes and troponin mildly elevated at 0.05. Patient also with significantly elevated BP on arrival 's. Patient given nitor past with improvement in CP and BP. Instructed ED to control BP more and give metoprolol. Cardiologist consulted and made aware of transfer. They agree with management and will consult on patient in the morning.     Vital Signs (Most Recent):  Temp: 99.4 °F (37.4 °C) (08/05/20 2043)  Pulse: 78 (08/05/20 2252)  Resp: 15 (08/05/20 2252)  BP: (!) 171/72 (08/05/20 2252)  SpO2: (!) 91 % (08/05/20 2252) Vital Signs (24h Range):  Temp:  [97.5 °F (36.4 °C)-99.4 °F (37.4 °C)] 99.4 °F (37.4 °C)  Pulse:  [72-83] 78  Resp:  [0-20] 15  SpO2:  [91 %-97 %] 91 %  BP: (128-203)/(67-88) 171/72       Labs & Radiographs: see EPIC    Significant Labs:   CMP:   Recent Labs   Lab 08/05/20 2047      K 4.1   CL 97   CO2 30*   *   BUN 39*   CREATININE 5.7*   CALCIUM 10.2   PROT 8.1   ALBUMIN 4.2   BILITOT 0.7   ALKPHOS 66   AST 20   ALT 26   ANIONGAP 13   ESTGFRAFRICA 9*   EGFRNONAA 8*   , CBC:   Recent Labs   Lab 08/05/20 2047   WBC 6.48   HGB 11.2*   HCT 34.9*      , INR:   Recent Labs   Lab 08/05/20 2047   INR 1.0    , Lipid Panel No results for input(s): CHOL, HDL, LDLCALC, TRIG, CHOLHDL in the last 48 hours. and Troponin   Recent Labs   Lab 08/05/20 2047   TROPONINI 0.053*       Significant Imaging: see epic    To Do List:   1. Admit to   2. Consult nephrology  3. Consult cardiology  4. Trend troponin   5. BP management    Gardy Jarvis MD  Hospital Medicine Staff

## 2020-08-06 NOTE — NURSING
Attempted to call report to nurse Henderson at [facility], states she will call back.

## 2020-08-07 NOTE — PROGRESS NOTES
JONATHAN called Cardiology office to schedule appt for pt [phone], spoke to Frieda, must fax medical records and nurse will call pt to schedule appt.  JONATHAN faxed medical records to [phone].

## 2020-08-07 NOTE — NURSING
VSS on room air.  Alert and oriented. Tolerating diet well. Ambulates independently. Pt ready for discharge home. Discharge packet and instruction given.  Verbalized understanding. PIV removed.  Tele monitor returned. daughter with pt.   Voices no questions or need.

## 2020-08-07 NOTE — NURSING
AAOx4. No acute changes overnight. Patient denies pain/discomfort at this time. Vitals stable - available in flow sheet. Tele-sitter at bedside. Bed locked/lowest position, side rails up x 2, nonskid socks when out of bed, call light within reach. Patient encouraged to call for assistance when needed.

## 2020-08-07 NOTE — DISCHARGE SUMMARY
[facility] Hospital Medicine  Discharge Summary      Patient Name: Caryn Mauricio  MRN: [id number]  Admission Date: 8/6/2020  Hospital Length of Stay: 0 days  Discharge Date and Time: 8/7/2020 12:52 PM  Attending Physician: No att. providers found   Discharging Provider: Natacha Murguia PA-C  Primary Care Provider: Travis Newell MD      HPI:   Ms. Caryn Mauricio is a 60 y.o. female, with PMH of IDDM-2, HFpEF, CAD, A. Fib (on Eliquis), CAD, HTN, HLD, prior TIA, tobacco use, ESRD on HD, presented to [facility] with chest pain x 2 hours PTA. She describes the pain as a constant heaviness. She noted in the ED it was associated with nausea and sweats, but did not endorse these upon admission. EKG in ED NSR without ischemic changes. Troponin elevated at 0.053. Started on metoprolol in ED when SBP > 200. She states her brother had an MI in his early 50's, she is a smoker w/ h/o CAD, HLD, and prior TIA. She was placed on OBDS,     * No surgery found *      Hospital Course:   60 y.o. female with PMH of IDDM-2, HFpEF, CAD, A. Fib (on Eliquis), CAD, HTN, HLD, prior TIA, tobacco use, ESRD on HD at [facility] in [place] with Dr. Sommer [place] [facility] [place], Cardio and Nephrology consulted. EKG without ischemia, troponins flat. Coronary angiography: Mild nonobstructive CAD 7/2019. S/p stress/ECHO ECG portion of study negative for myocardial ischemia/ stress echo portion of study negative for myocardial ischemia; patient's exercise capacity was severely impaired, sensitivity impaired due to the patient's failure to achieve target heart rate. Suspect pain, musculoskeletal. Clinically improved, stable for discharge home and outpatient follow up.      Consults:   Consults (From admission, onward)        Status Ordering Provider     Inpatient consult to Cardiology  Once     Provider:  Isaias Bowser MD    Completed ANNALISE BOLAÑOS     Inpatient consult to Nephrology-[facility]  Once      Provider:  Daiwt Javier NP    Completed ANNALISE BOLAÑOS          No new Assessment & Plan notes have been filed under this hospital service since the last note was generated.  Service: Hospital Medicine    Final Active Diagnoses:    Diagnosis Date Noted POA    PRINCIPAL PROBLEM:  Chest pain [R07.9] 08/04/2018 Yes    CAD (coronary artery disease) [I25.10] 07/15/2019 Yes    ESRD (end stage renal disease) on dialysis [N18.6, Z99.2] 11/13/2017 Not Applicable    Hyperlipidemia [E78.5] 11/13/2017 Yes    Paroxysmal atrial fibrillation [I48.0] 11/13/2017 Yes    Tobacco abuse [Z72.0] 08/14/2017 Yes    OLGA (obstructive sleep apnea) [G47.33] 11/04/2015 Yes    Chronic anticoagulation [Z79.01] 06/29/2015 Not Applicable    CHF (congestive heart failure) [I50.9] 04/13/2015 Yes     Chronic    Depression [F32.9] 08/14/2014 Yes    Essential hypertension [I10] 08/14/2014 Yes    Hypothyroidism [E03.9] 08/14/2014 Yes    Type 2 diabetes mellitus with chronic kidney disease on chronic dialysis, with long-term current use of insulin [E11.22, N18.6, Z99.2, Z79.4] 08/14/2014 Not Applicable      Problems Resolved During this Admission:       Discharged Condition: stable    Disposition: Home or Self Care    Follow Up:  Follow-up Information     Travis Newell MD.    Specialty: Internal Medicine  Why: post hospital follow-up  Contact information:  [address] [facility] [id number] [phone]                 Patient Instructions:      Diet diabetic     Diet renal     Notify your health care provider if you experience any of the following:  temperature >100.4     Notify your health care provider if you experience any of the following:  persistent nausea and vomiting or diarrhea     Notify your health care provider if you experience any of the following:  severe uncontrolled pain     Notify your health care provider if you experience any of the following:  difficulty breathing or increased cough     Notify your  health care provider if you experience any of the following:  severe persistent headache     Notify your health care provider if you experience any of the following:  persistent dizziness, light-headedness, or visual disturbances     Notify your health care provider if you experience any of the following:  increased confusion or weakness     Activity as tolerated       Significant Diagnostic Studies: Labs:   CMP  Sodium   Date Value Ref Range Status   08/07/2020 134 (L) 136 - 145 mmol/L Final     Potassium   Date Value Ref Range Status   08/07/2020 4.1 3.5 - 5.1 mmol/L Final     Chloride   Date Value Ref Range Status   08/07/2020 97 95 - 110 mmol/L Final     CO2   Date Value Ref Range Status   08/07/2020 25 23 - 29 mmol/L Final     Glucose   Date Value Ref Range Status   08/07/2020 181 (H) 70 - 110 mg/dL Final     BUN, Bld   Date Value Ref Range Status   08/07/2020 34 (H) 6 - 20 mg/dL Final     Creatinine   Date Value Ref Range Status   08/07/2020 4.8 (H) 0.5 - 1.4 mg/dL Final     Calcium   Date Value Ref Range Status   08/07/2020 8.8 8.7 - 10.5 mg/dL Final     Total Protein   Date Value Ref Range Status   08/05/2020 8.1 6.0 - 8.4 g/dL Final     Albumin   Date Value Ref Range Status   08/05/2020 4.2 3.5 - 5.2 g/dL Final     Total Bilirubin   Date Value Ref Range Status   08/05/2020 0.7 0.1 - 1.0 mg/dL Final     Comment:     For infants and newborns, interpretation of results should be based  on gestational age, weight and in agreement with clinical  observations.  Premature Infant recommended reference ranges:  Up to 24 hours.............<8.0 mg/dL  Up to 48 hours............<12.0 mg/dL  3-5 days..................<15.0 mg/dL  6-29 days.................<15.0 mg/dL       Alkaline Phosphatase   Date Value Ref Range Status   08/05/2020 66 55 - 135 U/L Final     AST   Date Value Ref Range Status   08/05/2020 20 10 - 40 U/L Final     ALT   Date Value Ref Range Status   08/05/2020 26 10 - 44 U/L Final     Anion Gap   Date  Value Ref Range Status   08/07/2020 12 8 - 16 mmol/L Final     eGFR if    Date Value Ref Range Status   08/07/2020 11 (A) >60 mL/min/1.73 m^2 Final     eGFR if non    Date Value Ref Range Status   08/07/2020 9 (A) >60 mL/min/1.73 m^2 Final     Comment:     Calculation used to obtain the estimated glomerular filtration  rate (eGFR) is the CKD-EPI equation.         and All labs within the past 24 hours have been reviewed    Pending Diagnostic Studies:     None         Medications:  Reconciled Home Medications:      Medication List      CHANGE how you take these medications    isosorbide mononitrate 30 MG 24 hr tablet  Commonly known as: IMDUR  Take 1 tablet (30 mg total) by mouth once daily. HOLD UNTIL FOLLOW UP WITH CARDIOLOGIST  What changed: additional instructions     METAMUCIL (SUGAR) Powd  Generic drug: psyllium seed (sugar)  Take 1 Scoop by mouth 2 (two) times daily.  What changed:   · when to take this  · reasons to take this     ranolazine 1,000 mg Tb12  Commonly known as: RANEXA  Take 1 tablet (1,000 mg total) by mouth 2 (two) times daily. HOLD UNTIL YOUR FOLLOW UP WITH CARDIOLOGIST  What changed: additional instructions        CONTINUE taking these medications    blood-glucose meter kit  Commonly known as: TRUE METRIX AIR GLUCOSE METER  True Metrix meter kit Use as instructed     citalopram 10 MG tablet  Commonly known as: CELEXA  Take 1 tablet (10 mg total) by mouth once daily.     clotrimazole-betamethasone 1-0.05% cream  Commonly known as: LOTRISONE  Apply topically 2 (two) times daily. for 7 days     ELIQUIS 2.5 mg Tab  Generic drug: apixaban  TAKE 1 TABLET BY MOUTH TWICE DAILY     ergocalciferol 50,000 unit Cap  Commonly known as: ERGOCALCIFEROL  Take 1 capsule (50,000 Units total) by mouth every 7 days.     FREESTYLE KIRILL 14 DAY SENSOR Kit  Generic drug: flash glucose sensor  1 application by Misc.(Non-Drug; Combo Route) route every 14 (fourteen) days.     furosemide  80 MG tablet  Commonly known as: LASIX  Take 1 tablet (80 mg total) by mouth 2 (two) times daily.     gabapentin 100 MG capsule  Commonly known as: NEURONTIN  Take 1 capsule (100 mg total) by mouth 3 (three) times daily.     HYDROcodone-acetaminophen 5-325 mg per tablet  Commonly known as: NORCO  Take 1 tablet by mouth every 8 (eight) hours as needed for Pain.     insulin lispro 100 unit/mL injection  Commonly known as: HumaLOG U-100 Insulin  Inject 66 Units into the skin continuous.     lancets 33 gauge Misc  1 lancet by Misc.(Non-Drug; Combo Route) route 3 (three) times daily.     levothyroxine 150 MCG tablet  Commonly known as: SYNTHROID  Take 1 tablet (150 mcg total) by mouth before breakfast.     linaGLIPtin 5 mg Tab tablet  Commonly known as: TRADJENTA  Take 1 tablet (5 mg total) by mouth once daily.     lisinopriL 5 MG tablet  Commonly known as: PRINIVIL,ZESTRIL  Take 0.5 tablets (2.5 mg total) by mouth once daily.     miconazole 2 % vaginal cream  Commonly known as: MICONAZOLE 7  Place 1 applicator vaginally every evening.     NEPRO ORAL  Take by mouth once daily.     nystatin cream  Commonly known as: MYCOSTATIN  APPLY  CREAM TOPICALLY TWICE DAILY     pantoprazole 40 MG tablet  Commonly known as: PROTONIX  Take 1 tablet by mouth once daily     RENVELA ORAL  Take by mouth.     triamcinolone acetonide 0.5% 0.5 % Crea  Commonly known as: KENALOG  APPLY  CREAM TOPICALLY TWICE DAILY     V-GO 30 Nohemi  Generic drug: sub-q insulin device, 30 unit  by Misc.(Non-Drug; Combo Route) route.        STOP taking these medications    aspirin 81 MG EC tablet  Commonly known as: ECOTRIN     blood sugar diagnostic Strp     ondansetron 4 MG Tbdl  Commonly known as: ZOFRAN-ODT            Indwelling Lines/Drains at time of discharge:   Lines/Drains/Airways     Drain                 Hemodialysis AV Fistula Left upper arm -- days                Time spent on the discharge of patient: >30 minutes  Patient was seen and examined on  the date of discharge and determined to be suitable for discharge.         Natacha Murguia PA-C  Department of Hospital Medicine  [facility]

## 2020-08-07 NOTE — PROGRESS NOTES
"Nephrology  Progress Note    Admit Date: 8/6/2020   LOS: 0 days     SUBJECTIVE:     Follow-up For:  ESRD    Interval History:     No more CP.  Stress echo neg.  Discussed with Natacha QUESADA.  Pt eager to go home.      Review of Systems:  Constitutional: No fever or chills  Respiratory: No cough or shortness of breath  Cardiovascular: No chest pain or palpitations  Gastrointestinal: No nausea or vomiting  Neurological: No confusion or weakness    OBJECTIVE:     Vital Signs Range (Last 24H):  BP (!) 174/79 (BP Location: Right arm, Patient Position: Lying)   Pulse 70   Temp 98.7 °F (37.1 °C) (Oral)   Resp 18   Ht 5' 6" (1.676 m)   Wt 102.5 kg (225 lb 15.5 oz)   LMP 12/23/2000 (LMP Unknown)   SpO2 (!) 92%   Breastfeeding No   BMI 36.47 kg/m²     Temp:  [97.4 °F (36.3 °C)-98.7 °F (37.1 °C)]   Pulse:  [60-85]   Resp:  [14-20]   BP: (110-174)/(60-85)   SpO2:  [92 %-97 %]     I & O (Last 24H):    Intake/Output Summary (Last 24 hours) at 8/7/2020 1104  Last data filed at 8/7/2020 0924  Gross per 24 hour   Intake 1340 ml   Output 2500 ml   Net -1160 ml       Physical Exam:  General appearance: Well developed, well nourished, drowsy  Eyes:  Conjunctivae/corneas clear. PERRL.  Lungs: Normal respiratory effort,   clear to auscultation bilaterally   Heart: Regular rate and rhythm, S1, S2 normal, no murmur, rub or lv.  Abdomen: Soft, non-tender non-distended; bowel sounds normal; no masses,  no organomegaly  Extremities: No cyanosis or clubbing. No edema.    Skin: Skin color, texture, turgor normal. No rashes or lesions  Neurologic: Normal strength and tone. No focal numbness or weakness   Left arm AVF    Laboratory Data:  Recent Labs   Lab 08/07/20  0440   WBC 6.93   RBC 3.77*   HGB 10.9*   HCT 34.4*      MCV 91   MCH 28.9   MCHC 31.7*       BMP:   Recent Labs   Lab 08/07/20  0440   *   *   K 4.1   CL 97   CO2 25   BUN 34*   CREATININE 4.8*   CALCIUM 8.8   MG 1.9     Lab Results   Component Value Date "    CALCIUM 8.8 08/07/2020    PHOS 4.2 08/07/2020       Lab Results   Component Value Date    .0 (H) 06/30/2016    CALCIUM 8.8 08/07/2020    PHOS 4.2 08/07/2020       No results found for: URICACID    BNP  Recent Labs   Lab 08/05/20 2047   *       Medications:  Medication list was reviewed and changes noted under Assessment/Plan.    Diagnostic Results:        ASSESSMENT/PLAN:     1. ESRD on HD TTS at [facility] in [place] with Dr. Sommer (N18.6, Z99.2):  Consent signed.  HD yesterday w/o issues and continue TTS while inpt.  Renally dose meds, avoid nephrotoxins, and monitor I/O's closely.  2. HTN (I12.9):  labile.  3. CP/CAD (R07.9, I25.10):  Defer to cards.  stress echo noted.   4. Anemia of CKD (D63.1):  At goal.        Ok to DC from Renal.

## 2020-08-09 NOTE — HOSPITAL COURSE
60 y.o. female with PMH of IDDM-2, HFpEF, CAD, A. Fib (on Eliquis), CAD, HTN, HLD, prior TIA, tobacco use, ESRD on HD at [facility] in [place] with Dr. Sommer transferred to [facility] with chest pain, Cardio and Nephrology consulted. EKG without ischemia, troponins flat. Coronary angiography: Mild nonobstructive CAD 7/2019. S/p stress/ECHO ECG portion of study negative for myocardial ischemia/ stress echo portion of study negative for myocardial ischemia; patient's exercise capacity was severely impaired, sensitivity impaired due to the patient's failure to achieve target heart rate. Clinically improved, stable for discharge home and outpatient follow up.

## 2020-09-14 PROBLEM — Z09 HOSPITAL DISCHARGE FOLLOW-UP: Status: RESOLVED | Noted: 2019-06-10 | Resolved: 2020-01-01

## 2020-10-07 NOTE — ED TRIAGE NOTES
60 y.o. female presents to ER   Chief Complaint   Patient presents with    coughing blood     onset 30 minutes pta. history of dry cough x2/3 days. pt has cup with approx 20/30 mls lio red blood tinged sputum.   . No acute distress noted.

## 2020-10-07 NOTE — ED PROVIDER NOTES
Encounter Date: 10/7/2020       History     Chief Complaint   Patient presents with    coughing blood     onset 30 minutes pta. history of dry cough x2/3 days. pt has cup with approx 20/30 mls lio red blood tinged sputum.     61 y/o female presents with 1 episode of coughing up blood. On blood thinner eliquis.   Reports cough for the past 2-3 days. Also reports nasal congestion and sore throat, onset today.  Denies abdominal pain, nausea, vomiting or diarrhea.   Smoker daily.     The history is provided by the patient.     Review of patient's allergies indicates:   Allergen Reactions    Iodine and iodide containing products Rash     Past Medical History:   Diagnosis Date    Adjustment disorder     AI (aortic insufficiency)     mild    Anemia     Anticoagulant long-term use     coumadin    Anxiety     Asthma     CHF (congestive heart failure)     CKD (chronic kidney disease) stage 4, GFR 15-29 ml/min 8/14/2014    dialysis x 1 year    Congestion of upper airway     Coronary artery disease     non obstructive    Dependence on renal dialysis     Depression 8/14/2014    Diabetes mellitus     Diabetes mellitus type 2 in obese 5/14/2015    Dialysis patient     Dyspnea     E. coli UTI 2014    Encounter for blood transfusion     Fatigue     GERD (gastroesophageal reflux disease)     Heart failure with preserved ejection fraction 8/14/2014    History of psychiatric hospitalization     10 years ago and [facility]    Hx of psychiatric care     Celexa    Hyperlipemia     Hypertension     Hypothyroidism 8/14/2014    Neuropathy 8/14/2014    PAF (paroxysmal atrial fibrillation)     Psychiatric problem     Pt reports hx of depression    Shortness of breath     Therapy     Per pt - PCP has been prescribing Celexa    Type 2 diabetes mellitus with diabetic nephropathy 8/14/2014     Past Surgical History:   Procedure Laterality Date    ABSCESS DRAINAGE Left     groin    AV FISTULA PLACEMENT,  BRACHIOCEPHALIC Left 1/11/2016    CARDIAC CATHETERIZATION  2/17/14, 7/24/09    non-obstructive CAD in RCA    COLONOSCOPY N/A 9/17/2015    Procedure: COLONOSCOPY;  Surgeon: Sorin Schneider MD;  Location: [facility] ENDO;  Service: Endoscopy;  Laterality: N/A;    COLONOSCOPY N/A 3/8/2016    Procedure: COLONOSCOPY;  Surgeon: Luis Bogran-Reyes, MD;  Location: [facility] ENDO;  Service: Endoscopy;  Laterality: N/A;    HYSTERECTOMY  partial; 2000    AUB/fibroids    LEFT HEART CATHETERIZATION Left 7/8/2019    Procedure: Left heart cath;  Surgeon: Charli Ferrera MD;  Location: [facility] CATH LAB;  Service: Cardiology;  Laterality: Left;    TONSILLECTOMY      TUBAL LIGATION      UPPER GASTROINTESTINAL ENDOSCOPY       Family History   Problem Relation Age of Onset    Diabetes Mother     Breast cancer Mother 65        one breast removed    Asthma Father     Emphysema Father     Alcohol abuse Sister     Diabetes Brother     Asthma Sister     Breast cancer Sister 70    Cancer Sister     Throat cancer Sister     Alcohol abuse Sister     Diabetes Brother     Depression Maternal Aunt     Diabetes Maternal Aunt     Diabetes Maternal Uncle     Asthma Paternal Uncle     No Known Problems Maternal Grandfather     Asthma Paternal Grandmother     Diabetes Paternal Grandmother     Colon cancer Neg Hx      Social History     Tobacco Use    Smoking status: Current Every Day Smoker     Packs/day: 0.50     Years: 35.00     Pack years: 17.50     Types: Cigarettes     Start date: 1/19/1987    Smokeless tobacco: Current User   Substance Use Topics    Alcohol use: Yes     Comment: occ    Drug use: No     Review of Systems   Constitutional: Negative for fever.   HENT: Positive for congestion, rhinorrhea and sore throat (associated with coughing). Negative for ear pain.    Respiratory: Positive for cough (coughing up blood x1 episode). Negative for shortness of breath.    Cardiovascular: Negative for chest pain.    Gastrointestinal: Negative for abdominal pain.   Genitourinary: Negative for difficulty urinating and dysuria.   Musculoskeletal: Negative for arthralgias, back pain, myalgias and neck pain.   Skin: Negative for rash and wound.   Neurological: Negative for weakness and headaches.   Psychiatric/Behavioral: Negative.        Physical Exam     Initial Vitals [10/07/20 1521]   BP Pulse Resp Temp SpO2   (!) 161/70 79 20 98.6 °F (37 °C) (!) 94 %      MAP       --         Physical Exam    Nursing note and vitals reviewed.  Constitutional: No distress.   HENT:   Head: Normocephalic and atraumatic.   Right Ear: External ear normal.   Left Ear: External ear normal.   Nose: Rhinorrhea present.   Mouth/Throat: Oropharynx is clear and moist and mucous membranes are normal. No oropharyngeal exudate.   Clear post nasal drip noted. Erythema bilateral nasal mucosa with clear nasal discharge noted.   Eyes: Conjunctivae, EOM and lids are normal. Right pupil is round. Left pupil is round. Pupils are equal.   Neck: Neck supple.   Cardiovascular: Normal rate, regular rhythm and normal heart sounds.   Pulmonary/Chest: Effort normal and breath sounds normal. No respiratory distress.   Abdominal: Normal appearance.   Musculoskeletal: Normal range of motion.   Neurological: She is alert and oriented to person, place, and time. She has normal strength. GCS eye subscore is 4. GCS verbal subscore is 5. GCS motor subscore is 6.   Skin: Skin is warm and dry. Capillary refill takes less than 2 seconds. No rash noted.   Psychiatric: She has a normal mood and affect. Her speech is normal and behavior is normal.         ED Course   Procedures  Labs Reviewed   CBC W/ AUTO DIFFERENTIAL - Abnormal; Notable for the following components:       Result Value    Hemoglobin 11.9 (*)     Hematocrit 36.7 (*)     RDW 14.9 (*)     Eos # 0.6 (*)     Eosinophil% 8.9 (*)     All other components within normal limits   COMPREHENSIVE METABOLIC PANEL - Abnormal;  Notable for the following components:    Glucose 135 (*)     BUN, Bld 40 (*)     Creatinine 5.2 (*)     eGFR if  10 (*)     eGFR if non  8 (*)     All other components within normal limits   APTT - Abnormal; Notable for the following components:    aPTT 32.2 (*)     All other components within normal limits   TROPONIN I - Abnormal; Notable for the following components:    Troponin I 0.031 (*)     All other components within normal limits   D DIMER, QUANTITATIVE   PROTIME-INR   CK-MB   CK   SARS-COV-2 RNA AMPLIFICATION, QUAL          Imaging Results          X-Ray Chest 1 View (Final result)  Result time 10/07/20 16:09:46    Final result by Corey Talavera MD (10/07/20 16:09:46)                 Impression:      Stable appearance in comparison the prior exam.      Electronically signed by: Corey Talavera  Date:    10/07/2020  Time:    16:09             Narrative:    EXAMINATION:  XR CHEST 1 VIEW    CLINICAL HISTORY:  Cough    TECHNIQUE:  Single frontal view of the chest was performed.    COMPARISON:  09/28/2020.    FINDINGS:  Appearance of the lungs is approximately stable in comparison to the prior exam with prominence of the pulmonary vascular and parenchymal markings.  Similar linear atelectasis or scar.    The cardiac silhouette is normal in size. Aortic atherosclerosis.    Bones are intact.                                  --Plan of care discussed with collaborating provider. Agrees with plan of care today.      --Patient no longer coughing up blood. Only that one episode prior to arrival.                         Clinical Impression:     ICD-10-CM ICD-9-CM   1. Upper respiratory tract infection, unspecified type  J06.9 465.9   2. Cough  R05 786.2   3. Coughing blood  R04.2 786.30                      Disposition:   Disposition: Discharged  Condition: Stable    The patient acknowledges that close follow up with medical provider is required. Instructed to follow up with PCP within 2  days. Patient was given specific return precautions. The patient agrees to comply with all instruction and directions given in the ER.      ED Disposition Condition    Discharge Stable        ED Prescriptions     Medication Sig Dispense Start Date End Date Auth. Provider    promethazine-dextromethorphan (PROMETHAZINE-DM) 6.25-15 mg/5 mL Syrp Take 5 mLs by mouth every 6 (six) hours as needed. 120 mL 10/7/2020 10/17/2020 Danielle Wong NP    doxycycline (VIBRAMYCIN) 100 MG Cap Take 1 capsule (100 mg total) by mouth 2 (two) times daily. Take with food. for 7 days 14 capsule 10/7/2020 10/14/2020 Danielle Wong NP        Follow-up Information     Follow up With Specialties Details Why Contact Info    Travis Newell MD Internal Medicine Schedule an appointment as soon as possible for a visit in 2 days  [address] [facility] [address]  [phone]                                         Danielle Wong NP  10/07/20 [id number]

## 2020-10-14 PROBLEM — R94.39 EQUIVOCAL STRESS TEST: Status: ACTIVE | Noted: 2020-01-01

## 2020-10-14 PROBLEM — I95.3 HYPOTENSION OF HEMODIALYSIS: Status: RESOLVED | Noted: 2019-05-28 | Resolved: 2020-01-01

## 2020-10-14 PROBLEM — R06.09 DYSPNEA ON EXERTION: Status: ACTIVE | Noted: 2020-01-01

## 2020-10-14 PROBLEM — I25.10 CAD (CORONARY ARTERY DISEASE): Status: RESOLVED | Noted: 2019-07-15 | Resolved: 2020-01-01

## 2020-10-14 PROBLEM — Z91.199 NONCOMPLIANCE: Status: ACTIVE | Noted: 2020-01-01

## 2020-10-14 PROBLEM — I25.9 MYOCARDIAL ISCHEMIA: Status: RESOLVED | Noted: 2019-07-15 | Resolved: 2020-01-01

## 2020-10-14 PROBLEM — Z01.818 PRE-OP EVALUATION: Status: ACTIVE | Noted: 2020-01-01

## 2020-12-11 PROBLEM — E11.3491: Status: ACTIVE | Noted: 2020-01-01

## 2020-12-11 PROBLEM — H43.822 VITREOMACULAR TRACTION, LEFT: Status: ACTIVE | Noted: 2020-01-01

## 2020-12-11 PROBLEM — E11.3591 PROLIFERATIVE DIABETIC RETINOPATHY OF RIGHT EYE WITHOUT MACULAR EDEMA ASSOCIATED WITH TYPE 2 DIABETES MELLITUS: Status: ACTIVE | Noted: 2020-01-01

## 2021-01-01 ENCOUNTER — HOSPITAL ENCOUNTER (EMERGENCY)
Facility: HOSPITAL | Age: 61
Discharge: HOME | End: 2021-01-29
Attending: SURGERY
Payer: MEDICARE

## 2021-01-01 ENCOUNTER — HOSPITAL ENCOUNTER (EMERGENCY)
Facility: HOSPITAL | Age: 61
Discharge: HOME | End: 2021-07-02
Attending: EMERGENCY MEDICINE
Payer: MEDICARE

## 2021-01-01 ENCOUNTER — PATIENT OUTREACH (OUTPATIENT)
Dept: ADMINISTRATIVE | Facility: CLINIC | Age: 61
End: 2021-01-01

## 2021-01-01 ENCOUNTER — HOSPITAL ENCOUNTER (EMERGENCY)
Facility: HOSPITAL | Age: 61
End: 2021-07-12
Attending: EMERGENCY MEDICINE
Payer: MEDICARE

## 2021-01-01 ENCOUNTER — PATIENT MESSAGE (OUTPATIENT)
Dept: ADMINISTRATIVE | Facility: OTHER | Age: 61
End: 2021-01-01

## 2021-01-01 ENCOUNTER — HOSPITAL ENCOUNTER (EMERGENCY)
Facility: HOSPITAL | Age: 61
Discharge: HOME | End: 2021-06-11
Attending: SURGERY
Payer: MEDICARE

## 2021-01-01 ENCOUNTER — HOSPITAL ENCOUNTER (EMERGENCY)
Facility: HOSPITAL | Age: 61
Discharge: HOME | End: 2021-03-19
Attending: EMERGENCY MEDICINE
Payer: MEDICARE

## 2021-01-01 ENCOUNTER — IMMUNIZATION (OUTPATIENT)
Dept: PHARMACY | Facility: CLINIC | Age: 61
End: 2021-01-01
Payer: MEDICARE

## 2021-01-01 ENCOUNTER — NURSE TRIAGE (OUTPATIENT)
Dept: ADMINISTRATIVE | Facility: CLINIC | Age: 61
End: 2021-01-01

## 2021-01-01 ENCOUNTER — LAB VISIT (OUTPATIENT)
Dept: LAB | Facility: HOSPITAL | Age: 61
End: 2021-01-01
Attending: INTERNAL MEDICINE
Payer: MEDICARE

## 2021-01-01 VITALS
OXYGEN SATURATION: 93 % | DIASTOLIC BLOOD PRESSURE: 72 MMHG | SYSTOLIC BLOOD PRESSURE: 157 MMHG | WEIGHT: 223.19 LBS | RESPIRATION RATE: 18 BRPM | TEMPERATURE: 96 F | BODY MASS INDEX: 38.31 KG/M2 | HEART RATE: 72 BPM

## 2021-01-01 VITALS
TEMPERATURE: 98 F | RESPIRATION RATE: 20 BRPM | DIASTOLIC BLOOD PRESSURE: 68 MMHG | HEART RATE: 63 BPM | OXYGEN SATURATION: 95 % | WEIGHT: 226 LBS | BODY MASS INDEX: 38.79 KG/M2 | SYSTOLIC BLOOD PRESSURE: 153 MMHG

## 2021-01-01 VITALS — TEMPERATURE: 97 F

## 2021-01-01 VITALS
DIASTOLIC BLOOD PRESSURE: 81 MMHG | HEART RATE: 68 BPM | TEMPERATURE: 97 F | BODY MASS INDEX: 38.47 KG/M2 | RESPIRATION RATE: 18 BRPM | OXYGEN SATURATION: 97 % | SYSTOLIC BLOOD PRESSURE: 159 MMHG | WEIGHT: 224.13 LBS

## 2021-01-01 VITALS
HEART RATE: 72 BPM | SYSTOLIC BLOOD PRESSURE: 144 MMHG | DIASTOLIC BLOOD PRESSURE: 66 MMHG | OXYGEN SATURATION: 96 % | WEIGHT: 224.88 LBS | BODY MASS INDEX: 38.6 KG/M2 | TEMPERATURE: 99 F | RESPIRATION RATE: 16 BRPM

## 2021-01-01 DIAGNOSIS — N18.6 ESRD (END STAGE RENAL DISEASE): Primary | ICD-10-CM

## 2021-01-01 DIAGNOSIS — R93.3 ABNORMAL FINDINGS ON DIAGNOSTIC IMAGING OF OTHER PARTS OF DIGESTIVE TRACT: ICD-10-CM

## 2021-01-01 DIAGNOSIS — L03.211 FACIAL CELLULITIS: Primary | ICD-10-CM

## 2021-01-01 DIAGNOSIS — R19.7 DIARRHEA, UNSPECIFIED TYPE: ICD-10-CM

## 2021-01-01 DIAGNOSIS — U07.1 COVID-19 VIRUS DETECTED: ICD-10-CM

## 2021-01-01 DIAGNOSIS — G62.9 NEUROPATHY: ICD-10-CM

## 2021-01-01 DIAGNOSIS — R19.7 DIARRHEA, UNSPECIFIED TYPE: Primary | ICD-10-CM

## 2021-01-01 DIAGNOSIS — E87.6 HYPOKALEMIA: ICD-10-CM

## 2021-01-01 DIAGNOSIS — R74.01 ELEVATION OF LEVELS OF LIVER TRANSAMINASE LEVELS: ICD-10-CM

## 2021-01-01 DIAGNOSIS — Z23 NEED FOR VACCINATION: Primary | ICD-10-CM

## 2021-01-01 DIAGNOSIS — R45.89 ANXIETY ABOUT HEALTH: ICD-10-CM

## 2021-01-01 DIAGNOSIS — I46.9 CARDIOPULMONARY ARREST: Primary | ICD-10-CM

## 2021-01-01 DIAGNOSIS — R79.89 ABNORMAL LFTS: ICD-10-CM

## 2021-01-01 DIAGNOSIS — R22.0 FACIAL SWELLING: ICD-10-CM

## 2021-01-01 DIAGNOSIS — R06.02 SHORTNESS OF BREATH: Primary | ICD-10-CM

## 2021-01-01 DIAGNOSIS — U07.1 COVID-19 VIRUS INFECTION: ICD-10-CM

## 2021-01-01 DIAGNOSIS — K04.7 DENTAL ABSCESS: ICD-10-CM

## 2021-01-01 LAB
ALBUMIN SERPL BCP-MCNC: 3.2 G/DL (ref 3.5–5.2)
ALBUMIN SERPL BCP-MCNC: 3.7 G/DL (ref 3.5–5.2)
ALP SERPL-CCNC: 298 U/L (ref 55–135)
ALP SERPL-CCNC: 305 U/L (ref 55–135)
ALP SERPL-CCNC: 328 U/L (ref 55–135)
ALP SERPL-CCNC: 78 U/L (ref 55–135)
ALP SERPL-CCNC: 93 U/L (ref 55–135)
ALT SERPL W/O P-5'-P-CCNC: 101 U/L (ref 10–44)
ALT SERPL W/O P-5'-P-CCNC: 15 U/L (ref 10–44)
ALT SERPL W/O P-5'-P-CCNC: 46 U/L (ref 10–44)
ALT SERPL W/O P-5'-P-CCNC: 51 U/L (ref 10–44)
ALT SERPL W/O P-5'-P-CCNC: 68 U/L (ref 10–44)
ANION GAP SERPL CALC-SCNC: 10 MMOL/L (ref 8–16)
ANION GAP SERPL CALC-SCNC: 13 MMOL/L (ref 8–16)
ANION GAP SERPL CALC-SCNC: 14 MMOL/L (ref 8–16)
ANION GAP SERPL CALC-SCNC: 14 MMOL/L (ref 8–16)
ANION GAP SERPL CALC-SCNC: 15 MMOL/L (ref 8–16)
APTT BLDCRRT: 29.5 SEC (ref 21–32)
AST SERPL-CCNC: 19 U/L (ref 10–40)
AST SERPL-CCNC: 21 U/L (ref 10–40)
AST SERPL-CCNC: 30 U/L (ref 10–40)
AST SERPL-CCNC: 41 U/L (ref 10–40)
AST SERPL-CCNC: 59 U/L (ref 10–40)
BACTERIA BLD CULT: NORMAL
BACTERIA BLD CULT: NORMAL
BASOPHILS # BLD AUTO: 0.01 K/UL (ref 0–0.2)
BASOPHILS # BLD AUTO: 0.02 K/UL (ref 0–0.2)
BASOPHILS # BLD AUTO: 0.03 K/UL (ref 0–0.2)
BASOPHILS # BLD AUTO: 0.04 K/UL (ref 0–0.2)
BASOPHILS NFR BLD: 0.1 % (ref 0–1.9)
BASOPHILS NFR BLD: 0.4 % (ref 0–1.9)
BASOPHILS NFR BLD: 0.4 % (ref 0–1.9)
BASOPHILS NFR BLD: 0.8 % (ref 0–1.9)
BILIRUB SERPL-MCNC: 0.7 MG/DL (ref 0.1–1)
BILIRUB SERPL-MCNC: 1.1 MG/DL (ref 0.1–1)
BILIRUB SERPL-MCNC: 1.5 MG/DL (ref 0.1–1)
BILIRUB SERPL-MCNC: 1.9 MG/DL (ref 0.1–1)
BILIRUB SERPL-MCNC: 2.3 MG/DL (ref 0.1–1)
BNP SERPL-MCNC: 1397 PG/ML (ref 0–99)
BNP SERPL-MCNC: 817 PG/ML (ref 0–99)
BUN SERPL-MCNC: 24 MG/DL (ref 6–20)
BUN SERPL-MCNC: 26 MG/DL (ref 6–20)
BUN SERPL-MCNC: 33 MG/DL (ref 6–20)
BUN SERPL-MCNC: 36 MG/DL (ref 6–20)
BUN SERPL-MCNC: 64 MG/DL (ref 6–20)
CALCIUM SERPL-MCNC: 8.2 MG/DL (ref 8.7–10.5)
CALCIUM SERPL-MCNC: 8.9 MG/DL (ref 8.7–10.5)
CALCIUM SERPL-MCNC: 9.1 MG/DL (ref 8.7–10.5)
CALCIUM SERPL-MCNC: 9.4 MG/DL (ref 8.7–10.5)
CALCIUM SERPL-MCNC: 9.5 MG/DL (ref 8.7–10.5)
CHLORIDE SERPL-SCNC: 103 MMOL/L (ref 95–110)
CHLORIDE SERPL-SCNC: 97 MMOL/L (ref 95–110)
CHLORIDE SERPL-SCNC: 97 MMOL/L (ref 95–110)
CHLORIDE SERPL-SCNC: 98 MMOL/L (ref 95–110)
CHLORIDE SERPL-SCNC: 98 MMOL/L (ref 95–110)
CHOLEST SERPL-MCNC: 200 MG/DL (ref 120–199)
CHOLEST/HDLC SERPL: 3.1 {RATIO} (ref 2–5)
CK MB SERPL-MCNC: 2.7 NG/ML (ref 0.1–6.5)
CK MB SERPL-MCNC: 2.7 NG/ML (ref 0.1–6.5)
CK MB SERPL-MCNC: 3.7 NG/ML (ref 0.1–6.5)
CK MB SERPL-RTO: 2.8 % (ref 0–5)
CK MB SERPL-RTO: 2.8 % (ref 0–5)
CK MB SERPL-RTO: 6 % (ref 0–5)
CK SERPL-CCNC: 62 U/L (ref 20–180)
CK SERPL-CCNC: 62 U/L (ref 20–180)
CK SERPL-CCNC: 96 U/L (ref 20–180)
CK SERPL-CCNC: 96 U/L (ref 20–180)
CK SERPL-CCNC: 97 U/L (ref 20–180)
CK SERPL-CCNC: 97 U/L (ref 20–180)
CO2 SERPL-SCNC: 25 MMOL/L (ref 23–29)
CO2 SERPL-SCNC: 25 MMOL/L (ref 23–29)
CO2 SERPL-SCNC: 27 MMOL/L (ref 23–29)
CO2 SERPL-SCNC: 27 MMOL/L (ref 23–29)
CO2 SERPL-SCNC: 28 MMOL/L (ref 23–29)
CREAT SERPL-MCNC: 4.1 MG/DL (ref 0.5–1.4)
CREAT SERPL-MCNC: 4.3 MG/DL (ref 0.5–1.4)
CREAT SERPL-MCNC: 4.4 MG/DL (ref 0.5–1.4)
CREAT SERPL-MCNC: 5.4 MG/DL (ref 0.5–1.4)
CREAT SERPL-MCNC: 5.5 MG/DL (ref 0.5–1.4)
CRP SERPL-MCNC: 24.8 MG/L (ref 0–8.2)
D DIMER PPP IA.FEU-MCNC: 0.58 MG/L FEU
DIFFERENTIAL METHOD: ABNORMAL
EOSINOPHIL # BLD AUTO: 0 K/UL (ref 0–0.5)
EOSINOPHIL # BLD AUTO: 0.1 K/UL (ref 0–0.5)
EOSINOPHIL # BLD AUTO: 0.1 K/UL (ref 0–0.5)
EOSINOPHIL # BLD AUTO: 0.2 K/UL (ref 0–0.5)
EOSINOPHIL NFR BLD: 0.1 % (ref 0–8)
EOSINOPHIL NFR BLD: 1.4 % (ref 0–8)
EOSINOPHIL NFR BLD: 2.8 % (ref 0–8)
EOSINOPHIL NFR BLD: 3.1 % (ref 0–8)
ERYTHROCYTE [DISTWIDTH] IN BLOOD BY AUTOMATED COUNT: 13.8 % (ref 11.5–14.5)
ERYTHROCYTE [DISTWIDTH] IN BLOOD BY AUTOMATED COUNT: 15.4 % (ref 11.5–14.5)
ERYTHROCYTE [DISTWIDTH] IN BLOOD BY AUTOMATED COUNT: 16.7 % (ref 11.5–14.5)
ERYTHROCYTE [DISTWIDTH] IN BLOOD BY AUTOMATED COUNT: 17 % (ref 11.5–14.5)
ERYTHROCYTE [SEDIMENTATION RATE] IN BLOOD BY WESTERGREN METHOD: 29 MM/HR (ref 0–20)
EST. GFR  (AFRICAN AMERICAN): 12 ML/MIN/1.73 M^2
EST. GFR  (AFRICAN AMERICAN): 12 ML/MIN/1.73 M^2
EST. GFR  (AFRICAN AMERICAN): 13 ML/MIN/1.73 M^2
EST. GFR  (AFRICAN AMERICAN): 9 ML/MIN/1.73 M^2
EST. GFR  (AFRICAN AMERICAN): 9 ML/MIN/1.73 M^2
EST. GFR  (NON AFRICAN AMERICAN): 10 ML/MIN/1.73 M^2
EST. GFR  (NON AFRICAN AMERICAN): 11 ML/MIN/1.73 M^2
EST. GFR  (NON AFRICAN AMERICAN): 11 ML/MIN/1.73 M^2
EST. GFR  (NON AFRICAN AMERICAN): 8 ML/MIN/1.73 M^2
EST. GFR  (NON AFRICAN AMERICAN): 8 ML/MIN/1.73 M^2
FERRITIN SERPL-MCNC: 1530 NG/ML (ref 20–300)
FERRITIN SERPL-MCNC: 968 NG/ML (ref 20–300)
GGT SERPL-CCNC: 396 U/L (ref 8–55)
GLUCOSE SERPL-MCNC: 127 MG/DL (ref 70–110)
GLUCOSE SERPL-MCNC: 181 MG/DL (ref 70–110)
GLUCOSE SERPL-MCNC: 218 MG/DL (ref 70–110)
GLUCOSE SERPL-MCNC: 305 MG/DL (ref 70–110)
GLUCOSE SERPL-MCNC: 338 MG/DL (ref 70–110)
HCT VFR BLD AUTO: 32.8 % (ref 37–48.5)
HCT VFR BLD AUTO: 33.3 % (ref 37–48.5)
HCT VFR BLD AUTO: 35.5 % (ref 37–48.5)
HCT VFR BLD AUTO: 37.9 % (ref 37–48.5)
HDLC SERPL-MCNC: 64 MG/DL (ref 40–75)
HDLC SERPL: 32 % (ref 20–50)
HGB BLD-MCNC: 10.9 G/DL (ref 12–16)
HGB BLD-MCNC: 11 G/DL (ref 12–16)
HGB BLD-MCNC: 11.6 G/DL (ref 12–16)
HGB BLD-MCNC: 12 G/DL (ref 12–16)
IMM GRANULOCYTES # BLD AUTO: 0.01 K/UL (ref 0–0.04)
IMM GRANULOCYTES # BLD AUTO: 0.01 K/UL (ref 0–0.04)
IMM GRANULOCYTES # BLD AUTO: 0.02 K/UL (ref 0–0.04)
IMM GRANULOCYTES # BLD AUTO: 0.12 K/UL (ref 0–0.04)
IMM GRANULOCYTES NFR BLD AUTO: 0.2 % (ref 0–0.5)
IMM GRANULOCYTES NFR BLD AUTO: 0.2 % (ref 0–0.5)
IMM GRANULOCYTES NFR BLD AUTO: 0.3 % (ref 0–0.5)
IMM GRANULOCYTES NFR BLD AUTO: 1.7 % (ref 0–0.5)
INR PPP: 1.1 (ref 0.8–1.2)
LACTATE SERPL-SCNC: 1 MMOL/L (ref 0.5–2.2)
LDH SERPL L TO P-CCNC: 255 U/L (ref 110–260)
LDLC SERPL CALC-MCNC: 117.4 MG/DL (ref 63–159)
LYMPHOCYTES # BLD AUTO: 0.6 K/UL (ref 1–4.8)
LYMPHOCYTES # BLD AUTO: 1 K/UL (ref 1–4.8)
LYMPHOCYTES # BLD AUTO: 1.1 K/UL (ref 1–4.8)
LYMPHOCYTES # BLD AUTO: 1.2 K/UL (ref 1–4.8)
LYMPHOCYTES NFR BLD: 13.8 % (ref 18–48)
LYMPHOCYTES NFR BLD: 21.5 % (ref 18–48)
LYMPHOCYTES NFR BLD: 23.9 % (ref 18–48)
LYMPHOCYTES NFR BLD: 8.7 % (ref 18–48)
MCH RBC QN AUTO: 28.5 PG (ref 27–31)
MCH RBC QN AUTO: 30.1 PG (ref 27–31)
MCH RBC QN AUTO: 30.4 PG (ref 27–31)
MCH RBC QN AUTO: 30.9 PG (ref 27–31)
MCHC RBC AUTO-ENTMCNC: 31.7 G/DL (ref 32–36)
MCHC RBC AUTO-ENTMCNC: 32.7 G/DL (ref 32–36)
MCHC RBC AUTO-ENTMCNC: 33 G/DL (ref 32–36)
MCHC RBC AUTO-ENTMCNC: 33.2 G/DL (ref 32–36)
MCV RBC AUTO: 86 FL (ref 82–98)
MCV RBC AUTO: 92 FL (ref 82–98)
MCV RBC AUTO: 94 FL (ref 82–98)
MCV RBC AUTO: 95 FL (ref 82–98)
MONOCYTES # BLD AUTO: 0.4 K/UL (ref 0.3–1)
MONOCYTES # BLD AUTO: 0.7 K/UL (ref 0.3–1)
MONOCYTES NFR BLD: 6.2 % (ref 4–15)
MONOCYTES NFR BLD: 8.3 % (ref 4–15)
MONOCYTES NFR BLD: 8.4 % (ref 4–15)
MONOCYTES NFR BLD: 9 % (ref 4–15)
NEUTROPHILS # BLD AUTO: 3.2 K/UL (ref 1.8–7.7)
NEUTROPHILS # BLD AUTO: 3.5 K/UL (ref 1.8–7.7)
NEUTROPHILS # BLD AUTO: 5.5 K/UL (ref 1.8–7.7)
NEUTROPHILS # BLD AUTO: 5.9 K/UL (ref 1.8–7.7)
NEUTROPHILS NFR BLD: 64 % (ref 38–73)
NEUTROPHILS NFR BLD: 66.4 % (ref 38–73)
NEUTROPHILS NFR BLD: 75.1 % (ref 38–73)
NEUTROPHILS NFR BLD: 83.2 % (ref 38–73)
NONHDLC SERPL-MCNC: 136 MG/DL
NRBC BLD-RTO: 0 /100 WBC
PLATELET # BLD AUTO: 120 K/UL (ref 150–450)
PLATELET # BLD AUTO: 173 K/UL (ref 150–350)
PLATELET # BLD AUTO: 292 K/UL (ref 150–350)
PLATELET # BLD AUTO: 98 K/UL (ref 150–450)
PMV BLD AUTO: 10 FL (ref 9.2–12.9)
PMV BLD AUTO: 10.5 FL (ref 9.2–12.9)
PMV BLD AUTO: 11 FL (ref 9.2–12.9)
PMV BLD AUTO: 11.3 FL (ref 9.2–12.9)
POCT GLUCOSE: 344 MG/DL (ref 70–110)
POTASSIUM SERPL-SCNC: 3.2 MMOL/L (ref 3.5–5.1)
POTASSIUM SERPL-SCNC: 3.5 MMOL/L (ref 3.5–5.1)
POTASSIUM SERPL-SCNC: 3.6 MMOL/L (ref 3.5–5.1)
POTASSIUM SERPL-SCNC: 4 MMOL/L (ref 3.5–5.1)
POTASSIUM SERPL-SCNC: 4.5 MMOL/L (ref 3.5–5.1)
PROCALCITONIN SERPL IA-MCNC: 0.18 NG/ML
PROT SERPL-MCNC: 7.2 G/DL (ref 6–8.4)
PROT SERPL-MCNC: 8 G/DL (ref 6–8.4)
PROT SERPL-MCNC: 8.1 G/DL (ref 6–8.4)
PROT SERPL-MCNC: 8.2 G/DL (ref 6–8.4)
PROT SERPL-MCNC: 8.3 G/DL (ref 6–8.4)
PROTHROMBIN TIME: 11.4 SEC (ref 9–12.5)
RBC # BLD AUTO: 3.62 M/UL (ref 4–5.4)
RBC # BLD AUTO: 3.76 M/UL (ref 4–5.4)
RBC # BLD AUTO: 3.82 M/UL (ref 4–5.4)
RBC # BLD AUTO: 3.99 M/UL (ref 4–5.4)
SARS-COV-2 RDRP RESP QL NAA+PROBE: NEGATIVE
SARS-COV-2 RDRP RESP QL NAA+PROBE: NEGATIVE
SODIUM SERPL-SCNC: 136 MMOL/L (ref 136–145)
SODIUM SERPL-SCNC: 136 MMOL/L (ref 136–145)
SODIUM SERPL-SCNC: 139 MMOL/L (ref 136–145)
SODIUM SERPL-SCNC: 139 MMOL/L (ref 136–145)
SODIUM SERPL-SCNC: 141 MMOL/L (ref 136–145)
TRIGL SERPL-MCNC: 93 MG/DL (ref 30–150)
TROPONIN I SERPL DL<=0.01 NG/ML-MCNC: 0.03 NG/ML (ref 0–0.03)
TROPONIN I SERPL DL<=0.01 NG/ML-MCNC: 0.03 NG/ML (ref 0–0.03)
TROPONIN I SERPL DL<=0.01 NG/ML-MCNC: 0.04 NG/ML (ref 0–0.03)
WBC # BLD AUTO: 5.03 K/UL (ref 3.9–12.7)
WBC # BLD AUTO: 5.21 K/UL (ref 3.9–12.7)
WBC # BLD AUTO: 7.05 K/UL (ref 3.9–12.7)
WBC # BLD AUTO: 7.33 K/UL (ref 3.9–12.7)

## 2021-01-01 PROCEDURE — 63600175 PHARM REV CODE 636 W HCPCS: Performed by: EMERGENCY MEDICINE

## 2021-01-01 PROCEDURE — 85379 FIBRIN DEGRADATION QUANT: CPT

## 2021-01-01 PROCEDURE — 83880 ASSAY OF NATRIURETIC PEPTIDE: CPT | Performed by: SURGERY

## 2021-01-01 PROCEDURE — 84484 ASSAY OF TROPONIN QUANT: CPT | Performed by: SURGERY

## 2021-01-01 PROCEDURE — 83605 ASSAY OF LACTIC ACID: CPT

## 2021-01-01 PROCEDURE — 84145 PROCALCITONIN (PCT): CPT

## 2021-01-01 PROCEDURE — 99283 EMERGENCY DEPT VISIT LOW MDM: CPT | Mod: 25

## 2021-01-01 PROCEDURE — 36415 COLL VENOUS BLD VENIPUNCTURE: CPT | Performed by: INTERNAL MEDICINE

## 2021-01-01 PROCEDURE — 83880 ASSAY OF NATRIURETIC PEPTIDE: CPT

## 2021-01-01 PROCEDURE — 80053 COMPREHEN METABOLIC PANEL: CPT | Performed by: EMERGENCY MEDICINE

## 2021-01-01 PROCEDURE — 80053 COMPREHEN METABOLIC PANEL: CPT | Performed by: INTERNAL MEDICINE

## 2021-01-01 PROCEDURE — 85610 PROTHROMBIN TIME: CPT

## 2021-01-01 PROCEDURE — 96375 TX/PRO/DX INJ NEW DRUG ADDON: CPT

## 2021-01-01 PROCEDURE — 25000003 PHARM REV CODE 250: Performed by: EMERGENCY MEDICINE

## 2021-01-01 PROCEDURE — 82728 ASSAY OF FERRITIN: CPT

## 2021-01-01 PROCEDURE — 99900035 HC TECH TIME PER 15 MIN (STAT)

## 2021-01-01 PROCEDURE — 92950 HEART/LUNG RESUSCITATION CPR: CPT

## 2021-01-01 PROCEDURE — 85651 RBC SED RATE NONAUTOMATED: CPT

## 2021-01-01 PROCEDURE — U0002 COVID-19 LAB TEST NON-CDC: HCPCS | Performed by: SURGERY

## 2021-01-01 PROCEDURE — 99285 EMERGENCY DEPT VISIT HI MDM: CPT | Mod: 25

## 2021-01-01 PROCEDURE — 85730 THROMBOPLASTIN TIME PARTIAL: CPT

## 2021-01-01 PROCEDURE — 93010 ELECTROCARDIOGRAM REPORT: CPT | Mod: ,,, | Performed by: INTERNAL MEDICINE

## 2021-01-01 PROCEDURE — 85025 COMPLETE CBC W/AUTO DIFF WBC: CPT

## 2021-01-01 PROCEDURE — 80053 COMPREHEN METABOLIC PANEL: CPT

## 2021-01-01 PROCEDURE — 82553 CREATINE MB FRACTION: CPT

## 2021-01-01 PROCEDURE — 36415 COLL VENOUS BLD VENIPUNCTURE: CPT | Performed by: SURGERY

## 2021-01-01 PROCEDURE — 82977 ASSAY OF GGT: CPT | Performed by: INTERNAL MEDICINE

## 2021-01-01 PROCEDURE — 84484 ASSAY OF TROPONIN QUANT: CPT

## 2021-01-01 PROCEDURE — U0002 COVID-19 LAB TEST NON-CDC: HCPCS | Performed by: EMERGENCY MEDICINE

## 2021-01-01 PROCEDURE — 80053 COMPREHEN METABOLIC PANEL: CPT | Performed by: SURGERY

## 2021-01-01 PROCEDURE — 85025 COMPLETE CBC W/AUTO DIFF WBC: CPT | Performed by: EMERGENCY MEDICINE

## 2021-01-01 PROCEDURE — 82550 ASSAY OF CK (CPK): CPT

## 2021-01-01 PROCEDURE — 82550 ASSAY OF CK (CPK): CPT | Performed by: SURGERY

## 2021-01-01 PROCEDURE — 31500 INSERT EMERGENCY AIRWAY: CPT

## 2021-01-01 PROCEDURE — 99283 EMERGENCY DEPT VISIT LOW MDM: CPT

## 2021-01-01 PROCEDURE — 87040 BLOOD CULTURE FOR BACTERIA: CPT

## 2021-01-01 PROCEDURE — 86140 C-REACTIVE PROTEIN: CPT

## 2021-01-01 PROCEDURE — 93005 ELECTROCARDIOGRAM TRACING: CPT

## 2021-01-01 PROCEDURE — 93010 EKG 12-LEAD: ICD-10-PCS | Mod: ,,, | Performed by: INTERNAL MEDICINE

## 2021-01-01 PROCEDURE — 99284 EMERGENCY DEPT VISIT MOD MDM: CPT | Mod: 25

## 2021-01-01 PROCEDURE — 85025 COMPLETE CBC W/AUTO DIFF WBC: CPT | Performed by: SURGERY

## 2021-01-01 PROCEDURE — 99900026 HC AIRWAY MAINTENANCE (STAT)

## 2021-01-01 PROCEDURE — 96365 THER/PROPH/DIAG IV INF INIT: CPT

## 2021-01-01 PROCEDURE — 36415 COLL VENOUS BLD VENIPUNCTURE: CPT | Performed by: EMERGENCY MEDICINE

## 2021-01-01 PROCEDURE — 83615 LACTATE (LD) (LDH) ENZYME: CPT

## 2021-01-01 PROCEDURE — 82962 GLUCOSE BLOOD TEST: CPT

## 2021-01-01 PROCEDURE — 80061 LIPID PANEL: CPT | Performed by: INTERNAL MEDICINE

## 2021-01-01 PROCEDURE — 82728 ASSAY OF FERRITIN: CPT | Performed by: INTERNAL MEDICINE

## 2021-01-01 RX ORDER — DIPHENOXYLATE HYDROCHLORIDE AND ATROPINE SULFATE 2.5; .025 MG/1; MG/1
1 TABLET ORAL
Status: COMPLETED | OUTPATIENT
Start: 2021-01-01 | End: 2021-01-01

## 2021-01-01 RX ORDER — EPINEPHRINE 0.1 MG/ML
INJECTION INTRAVENOUS CODE/TRAUMA/SEDATION MEDICATION
Status: COMPLETED | OUTPATIENT
Start: 2021-01-01 | End: 2021-01-01

## 2021-01-01 RX ORDER — CLINDAMYCIN PHOSPHATE 600 MG/50ML
600 INJECTION, SOLUTION INTRAVENOUS
Status: COMPLETED | OUTPATIENT
Start: 2021-01-01 | End: 2021-01-01

## 2021-01-01 RX ORDER — SODIUM BICARBONATE 1 MEQ/ML
SYRINGE (ML) INTRAVENOUS CODE/TRAUMA/SEDATION MEDICATION
Status: COMPLETED | OUTPATIENT
Start: 2021-01-01 | End: 2021-01-01

## 2021-01-01 RX ORDER — EPINEPHRINE 0.1 MG/ML
INJECTION INTRAVENOUS
Status: DISCONTINUED
Start: 2021-01-01 | End: 2021-01-01 | Stop reason: HOSPADM

## 2021-01-01 RX ORDER — AMIODARONE HYDROCHLORIDE 150 MG/3ML
INJECTION, SOLUTION INTRAVENOUS CODE/TRAUMA/SEDATION MEDICATION
Status: COMPLETED | OUTPATIENT
Start: 2021-01-01 | End: 2021-01-01

## 2021-01-01 RX ORDER — HYDROCODONE BITARTRATE AND ACETAMINOPHEN 5; 325 MG/1; MG/1
1 TABLET ORAL EVERY 6 HOURS PRN
Qty: 15 TABLET | Refills: 0 | Status: SHIPPED | OUTPATIENT
Start: 2021-01-01 | End: 2021-01-01 | Stop reason: SDUPTHER

## 2021-01-01 RX ORDER — MORPHINE SULFATE 4 MG/ML
4 INJECTION, SOLUTION INTRAMUSCULAR; INTRAVENOUS
Status: COMPLETED | OUTPATIENT
Start: 2021-01-01 | End: 2021-01-01

## 2021-01-01 RX ORDER — MAGNESIUM SULFATE HEPTAHYDRATE 40 MG/ML
INJECTION, SOLUTION INTRAVENOUS
Status: DISCONTINUED
Start: 2021-01-01 | End: 2021-01-01 | Stop reason: HOSPADM

## 2021-01-01 RX ORDER — CALCIUM CHLORIDE INJECTION 100 MG/ML
INJECTION, SOLUTION INTRAVENOUS CODE/TRAUMA/SEDATION MEDICATION
Status: COMPLETED | OUTPATIENT
Start: 2021-01-01 | End: 2021-01-01

## 2021-01-01 RX ORDER — AMOXICILLIN AND CLAVULANATE POTASSIUM 875; 125 MG/1; MG/1
1 TABLET, FILM COATED ORAL 2 TIMES DAILY
Qty: 14 TABLET | Refills: 0 | Status: SHIPPED | OUTPATIENT
Start: 2021-01-01 | End: 2021-01-01

## 2021-01-01 RX ORDER — MAGNESIUM SULFATE HEPTAHYDRATE 500 MG/ML
INJECTION, SOLUTION INTRAMUSCULAR; INTRAVENOUS CODE/TRAUMA/SEDATION MEDICATION
Status: COMPLETED | OUTPATIENT
Start: 2021-01-01 | End: 2021-01-01

## 2021-01-01 RX ORDER — LOPERAMIDE HYDROCHLORIDE 2 MG/1
2 CAPSULE ORAL 4 TIMES DAILY PRN
Qty: 12 CAPSULE | Refills: 0 | Status: SHIPPED | OUTPATIENT
Start: 2021-01-01 | End: 2021-01-01

## 2021-01-01 RX ORDER — ONDANSETRON 4 MG/1
4 TABLET, ORALLY DISINTEGRATING ORAL EVERY 8 HOURS PRN
Qty: 20 TABLET | Refills: 0 | Status: SHIPPED | OUTPATIENT
Start: 2021-01-01 | End: 2021-01-01 | Stop reason: SDUPTHER

## 2021-01-01 RX ORDER — DIPHENOXYLATE HYDROCHLORIDE AND ATROPINE SULFATE 2.5; .025 MG/1; MG/1
1 TABLET ORAL 3 TIMES DAILY PRN
Qty: 15 TABLET | Refills: 0 | Status: SHIPPED | OUTPATIENT
Start: 2021-01-01 | End: 2021-01-01

## 2021-01-01 RX ORDER — PANTOPRAZOLE SODIUM 40 MG/1
40 TABLET, DELAYED RELEASE ORAL DAILY
Qty: 30 TABLET | Refills: 0 | Status: SHIPPED | OUTPATIENT
Start: 2021-01-01 | End: 2021-01-01

## 2021-01-01 RX ADMIN — EPINEPHRINE 1 MG: 0.1 INJECTION INTRACARDIAC; INTRAVENOUS at 08:07

## 2021-01-01 RX ADMIN — SODIUM BICARBONATE 50 MEQ: 84 INJECTION, SOLUTION INTRAVENOUS at 08:07

## 2021-01-01 RX ADMIN — AMIODARONE HYDROCHLORIDE 300 MG: 50 INJECTION, SOLUTION INTRAVENOUS at 08:07

## 2021-01-01 RX ADMIN — CALCIUM CHLORIDE 1 G: 100 INJECTION, SOLUTION INTRAVENOUS at 08:07

## 2021-01-01 RX ADMIN — SODIUM CHLORIDE 1000 ML: 0.9 SOLUTION INTRAVENOUS at 08:07

## 2021-01-01 RX ADMIN — CLINDAMYCIN IN 5 PERCENT DEXTROSE 600 MG: 12 INJECTION, SOLUTION INTRAVENOUS at 07:03

## 2021-01-01 RX ADMIN — MAGNESIUM SULFATE HEPTAHYDRATE 2 G: 500 INJECTION, SOLUTION INTRAMUSCULAR; INTRAVENOUS at 08:07

## 2021-01-01 RX ADMIN — DIPHENOXYLATE HYDROCHLORIDE AND ATROPINE SULFATE 1 TABLET: 2.5; .025 TABLET ORAL at 08:07

## 2021-01-01 RX ADMIN — MORPHINE SULFATE 4 MG: 4 INJECTION, SOLUTION INTRAMUSCULAR; INTRAVENOUS at 08:03

## 2021-01-23 PROBLEM — R09.02 HYPOXIA: Status: ACTIVE | Noted: 2021-01-01

## 2021-01-23 PROBLEM — U07.1 COVID-19: Status: ACTIVE | Noted: 2021-01-01

## 2021-03-29 PROBLEM — E11.3511 PROLIFERATIVE DIABETIC RETINOPATHY OF RIGHT EYE WITH MACULAR EDEMA ASSOCIATED WITH TYPE 2 DIABETES MELLITUS: Status: ACTIVE | Noted: 2020-01-01

## 2021-03-29 PROBLEM — Z96.1 PSEUDOPHAKIA, LEFT EYE: Status: ACTIVE | Noted: 2021-01-01

## 2021-03-29 PROBLEM — H25.811 COMBINED FORM OF AGE-RELATED CATARACT, RIGHT EYE: Status: ACTIVE | Noted: 2021-01-01

## 2022-03-24 NOTE — ED NOTES
The patient is awake, alert and cooperative with a calm affect, patient is aware of environment. Airway is open and patent, respirations are spontaneous, normal respiratory effort and rate noted, skin warm and dry, full ROM in all extremities, appearance: no apparent distress noted, resting comfortably.  VSS, no change from previous assessment.  Bed in low, locked position, SR x2, HOB 30 degrees.  Pt able to change position independently.  Call bell within reach of pt.  Pt verbalizes understanding of use.  Will continue to monitor.   Solaraze Counseling:  I discussed with the patient the risks of Solaraze including but not limited to erythema, scaling, itching, weeping, crusting, and pain.